# Patient Record
Sex: FEMALE | Race: BLACK OR AFRICAN AMERICAN | NOT HISPANIC OR LATINO | Employment: OTHER | URBAN - METROPOLITAN AREA
[De-identification: names, ages, dates, MRNs, and addresses within clinical notes are randomized per-mention and may not be internally consistent; named-entity substitution may affect disease eponyms.]

---

## 2017-01-10 PROCEDURE — 82607 VITAMIN B-12: CPT | Performed by: FAMILY MEDICINE

## 2017-01-10 PROCEDURE — 85025 COMPLETE CBC W/AUTO DIFF WBC: CPT | Performed by: FAMILY MEDICINE

## 2017-01-10 PROCEDURE — 84443 ASSAY THYROID STIM HORMONE: CPT | Performed by: FAMILY MEDICINE

## 2017-01-10 PROCEDURE — 85007 BL SMEAR W/DIFF WBC COUNT: CPT | Performed by: FAMILY MEDICINE

## 2017-01-10 PROCEDURE — 80061 LIPID PANEL: CPT | Performed by: FAMILY MEDICINE

## 2017-01-10 PROCEDURE — 80053 COMPREHEN METABOLIC PANEL: CPT | Performed by: FAMILY MEDICINE

## 2017-04-13 ENCOUNTER — OFFICE VISIT (OUTPATIENT)
Dept: INTERNAL MEDICINE | Facility: CLINIC | Age: 67
End: 2017-04-13

## 2017-04-13 VITALS — WEIGHT: 147.2 LBS | BODY MASS INDEX: 27.79 KG/M2 | TEMPERATURE: 97.9 F | HEIGHT: 61 IN

## 2017-04-13 DIAGNOSIS — J30.9 ALLERGIC RHINITIS, UNSPECIFIED ALLERGIC RHINITIS TRIGGER, UNSPECIFIED RHINITIS SEASONALITY: Primary | ICD-10-CM

## 2017-04-13 PROCEDURE — 99213 OFFICE O/P EST LOW 20 MIN: CPT | Performed by: FAMILY MEDICINE

## 2017-04-13 NOTE — PROGRESS NOTES
"Subjective   Carolyn Snowden is a 66 y.o. female.     History of Present Illness   Here today as a work in for hoarseness.  Last seen 12/29/16 for 1mo recheck GI. Was seen 12/1/16 for MCW and 6mo routine.    RESP- today pt reports she lost her voice 3 days ago. Does not feel sick. No fever. No sinus symptoms. Has had a little runny nose.    The following portions of the patient's history were reviewed and updated as appropriate: current medications, past family history, past medical history, past social history, past surgical history and problem list.    Review of Systems   Cardiovascular: Negative for chest pain.   Gastrointestinal: Negative for abdominal distention and abdominal pain.   Skin: Negative for color change.   Neurological: Negative for tremors, speech difficulty and headaches.   Psychiatric/Behavioral: Negative for agitation and confusion.   All other systems reviewed and are negative.        Current Outpatient Prescriptions:   •  aspirin 81 MG tablet, Take  by mouth., Disp: , Rfl:   •  Biotin 1000 MCG tablet, Take  by mouth., Disp: , Rfl:   •  carbonyl iron (FEOSOL) 45 MG tablet tablet, Take  by mouth daily., Disp: , Rfl:   •  clonazePAM (KlonoPIN) 1 MG tablet, , Disp: , Rfl:   •  coenzyme Q10 100 MG capsule, Take 100 mg by mouth daily., Disp: , Rfl:   •  LORazepam (ATIVAN) 0.5 MG tablet, TAKE 1 TABLET BY MOUTH AT BEDTIME, Disp: , Rfl: 2  •  LORazepam (ATIVAN) 1 MG tablet, TAKE 1 TABLET BY MOUTH AT BEDTIME, Disp: , Rfl: 2  •  montelukast (SINGULAIR) 10 MG tablet, , Disp: , Rfl:   •  Multiple Vitamins-Minerals (MULTIVITAL PO), Take  by mouth daily., Disp: , Rfl:   •  NIFEdipine CC (ADALAT CC) 60 MG 24 hr tablet, Take 1 tablet by mouth Daily. For blood pressure, Disp: 30 tablet, Rfl: 5  •  omeprazole (priLOSEC) 20 MG capsule, , Disp: , Rfl:   •  vitamin B-12 (CYANOCOBALAMIN) 1000 MCG tablet, Take 1,000 mcg by mouth daily., Disp: , Rfl:     Objective     Temp 97.9 °F (36.6 °C)  Ht 61\" (154.9 cm)  Wt " 147 lb 3.2 oz (66.8 kg)  BMI 27.81 kg/m2    Physical Exam   Constitutional: She is oriented to person, place, and time. She appears well-developed and well-nourished.   HENT:   Right Ear: Tympanic membrane and ear canal normal.   Left Ear: Tympanic membrane and ear canal normal.   Mouth/Throat: Oropharynx is clear and moist.   Eyes: Conjunctivae and EOM are normal. Pupils are equal, round, and reactive to light.   Neck: No thyromegaly present.   Cardiovascular: Normal rate and regular rhythm.    Pulmonary/Chest: Effort normal and breath sounds normal.   Neurological: She is alert and oriented to person, place, and time.   Skin: Skin is warm and dry.   Psychiatric: She has a normal mood and affect.   Vitals reviewed.      Assessment/Plan   Carolyn was seen today for illness.    Diagnoses and all orders for this visit:    Allergic rhinitis, unspecified allergic rhinitis trigger, unspecified rhinitis seasonality      1. RESP- allergies- discussed that she could be at the early point of a respiratory virus or this could just be allergies. If it is a virus, she will get worse for 1-2 days and then resolve. If it is allergies, it will wax and wane until the pollen settles down. To use claritin (loratadine) with NO DECONGESTANTS. Discussed that decongestant will raise her BP. Pt is advised to treat to her level of comfort.  2. RECHECK- prn

## 2017-04-13 NOTE — PATIENT INSTRUCTIONS
1. RESP- allergies- discussed that she could be at the early point of a respiratory virus or this could just be allergies. If it is a virus, she will get worse for 1-2 days and then resolve. If it is allergies, it will wax and wane until the pollen settles down. To use claritin (loratadine) with NO DECONGESTANTS. Discussed that decongestant will raise her BP. Pt is advised to treat to her level of comfort.  2. RECHECK- prn

## 2017-06-15 DIAGNOSIS — I10 ESSENTIAL HYPERTENSION: ICD-10-CM

## 2017-06-15 RX ORDER — POTASSIUM CHLORIDE 1500 MG/1
TABLET, EXTENDED RELEASE ORAL
Qty: 30 TABLET | Refills: 0 | Status: SHIPPED | OUTPATIENT
Start: 2017-06-15 | End: 2018-02-26

## 2017-10-07 DIAGNOSIS — I10 ESSENTIAL HYPERTENSION: ICD-10-CM

## 2017-10-09 RX ORDER — POTASSIUM CHLORIDE 1500 MG/1
TABLET, EXTENDED RELEASE ORAL
Qty: 30 TABLET | Refills: 0 | OUTPATIENT
Start: 2017-10-09

## 2017-10-10 DIAGNOSIS — I10 ESSENTIAL HYPERTENSION: ICD-10-CM

## 2017-10-10 RX ORDER — POTASSIUM CHLORIDE 1500 MG/1
TABLET, EXTENDED RELEASE ORAL
Qty: 30 TABLET | Refills: 0 | OUTPATIENT
Start: 2017-10-10

## 2017-11-03 DIAGNOSIS — I10 ESSENTIAL HYPERTENSION: ICD-10-CM

## 2017-11-03 RX ORDER — HYDROCHLOROTHIAZIDE 12.5 MG/1
CAPSULE, GELATIN COATED ORAL
Qty: 30 CAPSULE | Refills: 5 | OUTPATIENT
Start: 2017-11-03

## 2018-02-26 ENCOUNTER — OFFICE VISIT (OUTPATIENT)
Dept: INTERNAL MEDICINE | Facility: CLINIC | Age: 68
End: 2018-02-26

## 2018-02-26 VITALS
HEIGHT: 61 IN | WEIGHT: 161.2 LBS | SYSTOLIC BLOOD PRESSURE: 138 MMHG | BODY MASS INDEX: 30.43 KG/M2 | DIASTOLIC BLOOD PRESSURE: 88 MMHG | TEMPERATURE: 97.2 F

## 2018-02-26 DIAGNOSIS — Z00.00 ANNUAL PHYSICAL EXAM: Primary | ICD-10-CM

## 2018-02-26 DIAGNOSIS — R41.3 MEMORY LOSS: ICD-10-CM

## 2018-02-26 DIAGNOSIS — I10 ESSENTIAL HYPERTENSION: ICD-10-CM

## 2018-02-26 LAB
ALBUMIN SERPL-MCNC: 4.4 G/DL (ref 3.2–4.8)
ALBUMIN/GLOB SERPL: 1.4 G/DL (ref 1.5–2.5)
ALP SERPL-CCNC: 56 U/L (ref 25–100)
ALT SERPL W P-5'-P-CCNC: 15 U/L (ref 7–40)
ANION GAP SERPL CALCULATED.3IONS-SCNC: 8 MMOL/L (ref 3–11)
ARTICHOKE IGE QN: 162 MG/DL (ref 0–130)
AST SERPL-CCNC: 21 U/L (ref 0–33)
BASOPHILS # BLD AUTO: 0.03 10*3/MM3 (ref 0–0.2)
BASOPHILS NFR BLD AUTO: 0.5 % (ref 0–1)
BILIRUB SERPL-MCNC: 0.6 MG/DL (ref 0.3–1.2)
BUN BLD-MCNC: 12 MG/DL (ref 9–23)
BUN/CREAT SERPL: 15 (ref 7–25)
CALCIUM SPEC-SCNC: 9.6 MG/DL (ref 8.7–10.4)
CHLORIDE SERPL-SCNC: 104 MMOL/L (ref 99–109)
CHOLEST SERPL-MCNC: 271 MG/DL (ref 0–200)
CO2 SERPL-SCNC: 28 MMOL/L (ref 20–31)
CREAT BLD-MCNC: 0.8 MG/DL (ref 0.6–1.3)
DEPRECATED RDW RBC AUTO: 46.6 FL (ref 37–54)
EOSINOPHIL # BLD AUTO: 0.13 10*3/MM3 (ref 0–0.3)
EOSINOPHIL NFR BLD AUTO: 2.3 % (ref 0–3)
ERYTHROCYTE [DISTWIDTH] IN BLOOD BY AUTOMATED COUNT: 15 % (ref 11.3–14.5)
GFR SERPL CREATININE-BSD FRML MDRD: 86 ML/MIN/1.73
GLOBULIN UR ELPH-MCNC: 3.2 GM/DL
GLUCOSE BLD-MCNC: 88 MG/DL (ref 70–100)
HCT VFR BLD AUTO: 41.1 % (ref 34.5–44)
HDLC SERPL-MCNC: 83 MG/DL (ref 40–60)
HGB BLD-MCNC: 13.1 G/DL (ref 11.5–15.5)
IMM GRANULOCYTES # BLD: 0.01 10*3/MM3 (ref 0–0.03)
IMM GRANULOCYTES NFR BLD: 0.2 % (ref 0–0.6)
LYMPHOCYTES # BLD AUTO: 3.02 10*3/MM3 (ref 0.6–4.8)
LYMPHOCYTES NFR BLD AUTO: 54.3 % (ref 24–44)
MCH RBC QN AUTO: 27.3 PG (ref 27–31)
MCHC RBC AUTO-ENTMCNC: 31.9 G/DL (ref 32–36)
MCV RBC AUTO: 85.6 FL (ref 80–99)
MONOCYTES # BLD AUTO: 0.49 10*3/MM3 (ref 0–1)
MONOCYTES NFR BLD AUTO: 8.8 % (ref 0–12)
NEUTROPHILS # BLD AUTO: 1.88 10*3/MM3 (ref 1.5–8.3)
NEUTROPHILS NFR BLD AUTO: 33.9 % (ref 41–71)
PLATELET # BLD AUTO: 250 10*3/MM3 (ref 150–450)
PMV BLD AUTO: 12 FL (ref 6–12)
POTASSIUM BLD-SCNC: 4.3 MMOL/L (ref 3.5–5.5)
PROT SERPL-MCNC: 7.6 G/DL (ref 5.7–8.2)
RBC # BLD AUTO: 4.8 10*6/MM3 (ref 3.89–5.14)
SODIUM BLD-SCNC: 140 MMOL/L (ref 132–146)
T4 FREE SERPL-MCNC: 1.2 NG/DL (ref 0.89–1.76)
TRIGL SERPL-MCNC: 82 MG/DL (ref 0–150)
TSH SERPL DL<=0.05 MIU/L-ACNC: 0.7 MIU/ML (ref 0.35–5.35)
VIT B12 BLD-MCNC: 574 PG/ML (ref 211–911)
WBC NRBC COR # BLD: 5.56 10*3/MM3 (ref 3.5–10.8)

## 2018-02-26 PROCEDURE — 84443 ASSAY THYROID STIM HORMONE: CPT | Performed by: FAMILY MEDICINE

## 2018-02-26 PROCEDURE — 84439 ASSAY OF FREE THYROXINE: CPT | Performed by: FAMILY MEDICINE

## 2018-02-26 PROCEDURE — 85025 COMPLETE CBC W/AUTO DIFF WBC: CPT | Performed by: FAMILY MEDICINE

## 2018-02-26 PROCEDURE — 82607 VITAMIN B-12: CPT | Performed by: FAMILY MEDICINE

## 2018-02-26 PROCEDURE — G0439 PPPS, SUBSEQ VISIT: HCPCS | Performed by: FAMILY MEDICINE

## 2018-02-26 PROCEDURE — 80061 LIPID PANEL: CPT | Performed by: FAMILY MEDICINE

## 2018-02-26 PROCEDURE — 80053 COMPREHEN METABOLIC PANEL: CPT | Performed by: FAMILY MEDICINE

## 2018-02-26 NOTE — PATIENT INSTRUCTIONS
"1. MCW- annual fasting labs today. Discussed that mammos can be done every 2yr now and can stop at 74yo. Will locate her bone density results.   2. CV- HTN- BP within range even with pt off meds. Discussed that she is at the higher end of normal but if she is not going above 140 on top or 90 on bottom, then she may not truly have HTN. Discussed that nifedipine is not designed to use \"as needed\" as it takes weeks to control the BP. She is to check her BP every morning for the next month and then bring the log to a visit.   3. PSYCH- sleep disorder with current memory loss. Will check labs. Will check a mini mental. Will check a brain MRI. Pt also advised that not sleeping can cause serious memory loss and to see her sleep doctor sooner if needed.  4. RECHECK- 1mo  "

## 2018-02-26 NOTE — PROGRESS NOTES
QUICK REFERENCE INFORMATION:  The ABCs of the Annual Wellness Visit    Subsequent Medicare Wellness Visit    HEALTH RISK ASSESSMENT    1950    Recent Hospitalizations:  No hospitalization(s) within the last year..        Current Medical Providers:  Patient Care Team:  Ana Luisa Gauthier MD as PCP - General  Ana Luisa Gauthier MD as PCP - Family Medicine  Ana Luisa Gauthier MD as PCP - Claims Attributed        Smoking Status:  History   Smoking Status   • Former Smoker   Smokeless Tobacco   • Never Used       Alcohol Consumption:  History   Alcohol Use   • Yes     Comment: occasional       Depression Screen:   PHQ-2/PHQ-9 Depression Screening 2/26/2018   Little interest or pleasure in doing things 0   Feeling down, depressed, or hopeless 0   Trouble falling or staying asleep, or sleeping too much 1   Feeling tired or having little energy 1   Poor appetite or overeating 1   Feeling bad about yourself - or that you are a failure or have let yourself or your family down 0   Trouble concentrating on things, such as reading the newspaper or watching television 1   Moving or speaking so slowly that other people could have noticed. Or the opposite - being so fidgety or restless that you have been moving around a lot more than usual 0   Thoughts that you would be better off dead, or of hurting yourself in some way 0   Total Score 4   If you checked off any problems, how difficult have these problems made it for you to do your work, take care of things at home, or get along with other people? Not difficult at all       Health Habits and Functional and Cognitive Screening:  Functional & Cognitive Status 2/26/2018   Do you have difficulty preparing food and eating? No   Do you have difficulty bathing yourself, getting dressed or grooming yourself? No   Do you have difficulty using the toilet? No   Do you have difficulty moving around from place to place? No   Do you have trouble with steps or getting out of a bed or a  chair? No   In the past year have you fallen or experienced a near fall? Yes   Current Diet Well Balanced Diet   Dental Exam Up to date   Eye Exam Up to date   Exercise (times per week) 3 times per week   Current Exercise Activities Include Walking   Do you need help using the phone?  No   Are you deaf or do you have serious difficulty hearing?  No   Do you need help with transportation? No   Do you need help shopping? No   Do you need help preparing meals?  No   Do you need help with housework?  No   Do you need help with laundry? No   Do you need help taking your medications? No   Do you need help managing money? No   Have you felt unusual stress, anger or loneliness in the last month? No   Who do you live with? Alone   If you need help, do you have trouble finding someone available to you? No   Have you been bothered in the last four weeks by sexual problems? No   Do you have difficulty concentrating, remembering or making decisions? No           Does the patient have evidence of cognitive impairment? No    Aspirin use counseling: Taking ASA appropriately as indicated      Recent Lab Results:  CMP:  Lab Results   Component Value Date    BUN 12 01/10/2017    CREATININE 0.90 01/10/2017    EGFRIFAFRI 75 01/10/2017    BCR 13.3 01/10/2017     01/10/2017    K 4.4 01/10/2017    CO2 32.0 (H) 01/10/2017    CALCIUM 10.0 01/10/2017    ALBUMIN 4.20 01/10/2017    LABIL2 1.4 (L) 01/10/2017    BILITOT 0.6 01/10/2017    ALKPHOS 54 01/10/2017    AST 28 01/10/2017    ALT 17 01/10/2017     Lipid Panel:  Lab Results   Component Value Date    CHOL 274 (H) 01/10/2017    TRIG 80 01/10/2017    HDL 92 (H) 01/10/2017     HbA1c:       Visual Acuity:  No exam data present    Age-appropriate Screening Schedule:  Refer to the list below for future screening recommendations based on patient's age, sex and/or medical conditions. Orders for these recommended tests are listed in the plan section. The patient has been provided with a written  plan.    Health Maintenance   Topic Date Due   • TDAP/TD VACCINES (1 - Tdap) 08/02/1967   • PNEUMOCOCCAL VACCINES (65+ LOW/MEDIUM RISK) (1 of 2 - PCV13) 08/02/2013   • ZOSTER VACCINE  06/01/2016   • INFLUENZA VACCINE  08/01/2017   • MAMMOGRAM  12/04/2019   • COLONOSCOPY  09/04/2025        Subjective   History of Present Illness    Carolyn Snowden is a 67 y.o. female who presents for an Subsequent Wellness Visit.  Here for MCW. Last seen 4/13/17 for allergies, advised OTC Claritin. Was seen 12/29/16 for 1mo recheck GI, HTN. Was seen 12/1/16 for MCW and 6mo routine. Was seen 12/1/15 as a new patient, to establish. Has “sleep REM” d/o, treated at a sleep center in [place]; takes lorazepam for this. Labs 1/10/17 demo normal CBC, CMP, TSH, B12 and lipid with , tg 80, HDL 92,  (previous 162, tg 57, HDL 76, LDL 58).      1.CV- HTN. Currently on nifedipine. Has ACEI cough and was able to stop HCTZ/ Klorcon in 2016. No cardio symptoms. Was jogging at last discussion. Today pt reports she stopped the procardia after her last visit. Takes her BP and takes it prn, usually once a week. No CP, palpitations, dyspnea or edema.     2.GI- GERD. Currently on omeprazole. Did not get adequate control with protonix in past. Today pt reports she was able to stop the omeprazole a month after her last routine.    3. MCW- initial 12/1/16  Bone Density- none. Ordered 12/1/16 with no results received.  Colonoscopy- 9/4/15. Pt had 3 polyps. Advised 5yr recheck.   EKG- 12/1/16  Mammo- 12/4/17- [facility]  Hep C screen- declined  Immunizations:  Pneumovax: declined.                        Zostavax: none. Pt believes she never had chickenpox                        Flu: none                        Hep B series: NA                        TDAP: none  Specialists: Eye Specialist- cataract surgery                        Dr Green- Pulmonology- sleep d/o    CONCERNS- today pt reports she is having trouble getting to sleep currently.  Will not sleep for 2 days and then will sleep for 2 days. If she is sleeping long and hard, she will wet the bed.  Is having memory loss. Cannot remember an entire day from 2 wk ago. Has also had balance issues with 1 almost fall.  Next appt with sleep doctor is in April. No fam hx Alzheimer's. Sibs are having some memory loss (sister and brother have both had CVA).     The following portions of the patient's history were reviewed and updated as appropriate: current medications, past family history, past medical history, past social history, past surgical history and problem list.    Outpatient Medications Prior to Visit   Medication Sig Dispense Refill   • aspirin 81 MG tablet Take  by mouth.     • Biotin 1000 MCG tablet Take  by mouth.     • carbonyl iron (FEOSOL) 45 MG tablet tablet Take  by mouth daily.     • clonazePAM (KlonoPIN) 1 MG tablet      • coenzyme Q10 100 MG capsule Take 100 mg by mouth daily.     • KLOR-CON 20 MEQ CR tablet TAKE ONE TABLET BY MOUTH ONCE DAILY 30 tablet 0   • LORazepam (ATIVAN) 0.5 MG tablet TAKE 1 TABLET BY MOUTH AT BEDTIME  2   • LORazepam (ATIVAN) 1 MG tablet TAKE 1 TABLET BY MOUTH AT BEDTIME  2   • montelukast (SINGULAIR) 10 MG tablet      • Multiple Vitamins-Minerals (MULTIVITAL PO) Take  by mouth daily.     • NIFEdipine CC (ADALAT CC) 60 MG 24 hr tablet Take 1 tablet by mouth Daily. For blood pressure 30 tablet 5   • omeprazole (priLOSEC) 20 MG capsule      • vitamin B-12 (CYANOCOBALAMIN) 1000 MCG tablet Take 1,000 mcg by mouth daily.       No facility-administered medications prior to visit.        Patient Active Problem List   Diagnosis   • Atopic rhinitis   • Diverticulitis of intestine   • Gastroesophageal reflux disease   • Essential hypertension   • REM sleep behavior disorder       Advance Care Planning:  has NO advance directive - information provided to the patient today    Identification of Risk Factors:  Risk factors include: n/a.    Review of Systems  "  Cardiovascular: Negative for chest pain.   Gastrointestinal: Negative for abdominal distention and abdominal pain.   Skin: Negative for color change.   Neurological: Negative for tremors, speech difficulty and headaches.   Psychiatric/Behavioral: Negative for agitation and confusion.   All other systems reviewed and are negative.      Compared to one year ago, the patient feels her physical health is the same.  Compared to one year ago, the patient feels her mental health is the same.    Objective     Physical Exam   Constitutional: She is oriented to person, place, and time. She appears well-developed and well-nourished.   HENT:   Right Ear: Tympanic membrane and ear canal normal.   Left Ear: Tympanic membrane and ear canal normal.   Mouth/Throat: Oropharynx is clear and moist.   Eyes: Conjunctivae and EOM are normal. Pupils are equal, round, and reactive to light.   Neck: No thyromegaly present.   Cardiovascular: Normal rate and regular rhythm.    Pulmonary/Chest: Effort normal and breath sounds normal.   Neurological: She is alert and oriented to person, place, and time.   Skin: Skin is warm and dry.   Psychiatric: She has a normal mood and affect.   Vitals reviewed.      Vitals:    02/26/18 1411   BP: 138/88   Temp: 97.2 °F (36.2 °C)   Weight: 73.1 kg (161 lb 3.2 oz)   Height: 154.9 cm (61\")   PainSc: 0-No pain       Body mass index is 30.46 kg/(m^2).  Discussed the patient's BMI with her. BMI is above normal parameters. Follow-up plan includes:  no follow-up required.    Assessment/Plan   Patient Self-Management and Personalized Health Advice  The patient has been provided with information about: Discussion today with patient regarding current guidelines for timing/ frequency of paps, mammograms, colonoscopy (or cologuard), bone density tests and lab tests.     Immunizations discussed today with current recommendations advised for DTaP, Zostavax, Pneumovax and Prevnar 13.    Appropriate diet and stretching " "discussed with handouts provided.  Visit Diagnoses:  No diagnosis found.    No orders of the defined types were placed in this encounter.      Outpatient Encounter Prescriptions as of 2/26/2018   Medication Sig Dispense Refill   • aspirin 81 MG tablet Take  by mouth.     • Biotin 1000 MCG tablet Take  by mouth.     • carbonyl iron (FEOSOL) 45 MG tablet tablet Take  by mouth daily.     • clonazePAM (KlonoPIN) 1 MG tablet      • coenzyme Q10 100 MG capsule Take 100 mg by mouth daily.     • KLOR-CON 20 MEQ CR tablet TAKE ONE TABLET BY MOUTH ONCE DAILY 30 tablet 0   • LORazepam (ATIVAN) 0.5 MG tablet TAKE 1 TABLET BY MOUTH AT BEDTIME  2   • LORazepam (ATIVAN) 1 MG tablet TAKE 1 TABLET BY MOUTH AT BEDTIME  2   • montelukast (SINGULAIR) 10 MG tablet      • Multiple Vitamins-Minerals (MULTIVITAL PO) Take  by mouth daily.     • NIFEdipine CC (ADALAT CC) 60 MG 24 hr tablet Take 1 tablet by mouth Daily. For blood pressure 30 tablet 5   • omeprazole (priLOSEC) 20 MG capsule      • vitamin B-12 (CYANOCOBALAMIN) 1000 MCG tablet Take 1,000 mcg by mouth daily.       No facility-administered encounter medications on file as of 2/26/2018.        Reviewed use of high risk medication in the elderly: yes  Reviewed for potential of harmful drug interactions in the elderly: not applicable    Follow Up:  No Follow-up on file.     An After Visit Summary and PPPS with all of these plans were given to the patient.    1. MCW- annual fasting labs today. Discussed that mammos can be done every 2yr now and can stop at 72yo. Will locate her bone density results.   2. CV- HTN- BP within range even with pt off meds. Discussed that she is at the higher end of normal but if she is not going above 140 on top or 90 on bottom, then she may not truly have HTN. Discussed that nifedipine is not designed to use \"as needed\" as it takes weeks to control the BP. She is to check her BP every morning for the next month and then bring the log to a visit.   3. " PSYCH- sleep disorder with current memory loss. Will check labs. Will check a mini mental (score 29/30, missed the date). Will check a brain MRI. Pt also advised that not sleeping can cause serious memory loss and to see her sleep doctor sooner if needed.  4. RECHECK- 1mo

## 2018-03-05 ENCOUNTER — HOSPITAL ENCOUNTER (OUTPATIENT)
Dept: MRI IMAGING | Facility: HOSPITAL | Age: 68
Discharge: HOME | End: 2018-03-05
Attending: FAMILY MEDICINE | Admitting: FAMILY MEDICINE

## 2018-03-05 PROCEDURE — 0 GADOBENATE DIMEGLUMINE 529 MG/ML SOLUTION: Performed by: FAMILY MEDICINE

## 2018-03-05 PROCEDURE — A9577 INJ MULTIHANCE: HCPCS | Performed by: FAMILY MEDICINE

## 2018-03-05 PROCEDURE — 70553 MRI BRAIN STEM W/O & W/DYE: CPT

## 2018-03-05 RX ADMIN — GADOBENATE DIMEGLUMINE 15 ML: 529 INJECTION, SOLUTION INTRAVENOUS at 15:00

## 2018-03-13 ENCOUNTER — OFFICE VISIT (OUTPATIENT)
Dept: INTERNAL MEDICINE | Facility: CLINIC | Age: 68
End: 2018-03-13

## 2018-03-13 ENCOUNTER — TELEPHONE (OUTPATIENT)
Dept: INTERNAL MEDICINE | Facility: CLINIC | Age: 68
End: 2018-03-13

## 2018-03-13 VITALS
TEMPERATURE: 97.5 F | DIASTOLIC BLOOD PRESSURE: 72 MMHG | SYSTOLIC BLOOD PRESSURE: 120 MMHG | BODY MASS INDEX: 30.7 KG/M2 | HEIGHT: 61 IN | WEIGHT: 162.6 LBS

## 2018-03-13 DIAGNOSIS — E78.00 PURE HYPERCHOLESTEROLEMIA: ICD-10-CM

## 2018-03-13 DIAGNOSIS — F41.8 DEPRESSION WITH ANXIETY: Primary | ICD-10-CM

## 2018-03-13 PROCEDURE — 99214 OFFICE O/P EST MOD 30 MIN: CPT | Performed by: FAMILY MEDICINE

## 2018-03-13 RX ORDER — ESCITALOPRAM OXALATE 10 MG/1
10 TABLET ORAL DAILY
Qty: 30 TABLET | Refills: 0 | Status: SHIPPED | OUTPATIENT
Start: 2018-03-13 | End: 2018-04-17

## 2018-03-13 NOTE — PATIENT INSTRUCTIONS
1. PSYCH- mood- discussed that her symptoms suggest a serotonin imbalance in her limbic system and her gut. Will do a trial with lexapro.   2. CV- hyperlipid. Education re the pathophysiology. Discussed that her LDL needs to be under 160, preferably closer to 130. Will start with diet as pt has not been following a healthy recently. Will recheck this in 1mo.  3. RECHECK- postpone appt in 2wk for BP to 4wk and will look at BP, lipid and mood

## 2018-03-13 NOTE — PROGRESS NOTES
Subjective   Carolyn Snowden is a 67 y.o. female   Here for recheck memory loss and high cholesterol. Last seen 2/26/18 for MCW. Was seen 4/13/17 for allergies, advised OTC Claritin. Was seen 12/29/16 for 1mo recheck GI, HTN. Was seen 12/1/16 for MCW and 6mo routine. Was seen 12/1/15 as a new patient, to establish. Has “sleep REM” d/o, treated at a sleep center in [place]; takes lorazepam for this. Lab 2/26/18 demo normal CBC, CMP, TSH, free T4 and B12. Lipid was high with , tg 82, HDL 80, . Labs 1/10/17 demo normal CBC, CMP, TSH, B12 and lipid with , tg 80, HDL 92,  (previous 162, tg 57, HDL 76, LDL 58).      1.CV- HTN, improved with pt able to stop nifedipine in 2017 and HCTZ/ Klorcon in 2016. No cardio symptoms with jogging. At her last visit her lipid was up. Today pt reports she has been eating more comfort foods and gaining weight.    2. PSYCH- memory loss. At her MCW pt reported continued sleep issues (pt has sleep REM d/o, treated by sleep specialist in [place]). Was having trouble getting to sleep and then sleeping so hard she wet the bed. Could not remember an entire day 2wk prior. Next appt with sleep doctor is in April. No fam hx Alzheimer's. Sibs are having some memory loss (sister and brother have both had CVA). Mini mental was normal. MRI brain was normal. Today pt reports she has been feeling depressed. Occurs every year around January which is the anniversary of her 's death 5yr ago. Pt is sad with associated symptoms of: some guilt, anhedonia, withdrawing, especially having decreased motivation crying and memory problem. Having loose stools after meals. Having sleep issues with insomnia and then oversleeping to compensate.    The following portions of the patient's history were reviewed and updated as appropriate: allergies, past family history, past medical history, past social history, past surgical history and problem list.    Review of Systems:  General: negative  CV:  "negative  Respiratory: negative  Neuro: negative  Psych: negative    Objective   /72 (BP Location: Right arm, Patient Position: Sitting, Cuff Size: Adult)   Temp 97.5 °F (36.4 °C) (Temporal Artery )   Ht 154.9 cm (61\")   Wt 73.8 kg (162 lb 9.6 oz)   BMI 30.72 kg/m²     Physical Exam   Constitutional: She is oriented to person, place, and time. She appears well-developed and well-nourished.   HENT:   Right Ear: Tympanic membrane and ear canal normal.   Left Ear: Tympanic membrane and ear canal normal.   Mouth/Throat: Oropharynx is clear and moist.   Eyes: Conjunctivae and EOM are normal. Pupils are equal, round, and reactive to light.   Neck: No thyromegaly present.   Cardiovascular: Normal rate and regular rhythm.    Pulmonary/Chest: Effort normal and breath sounds normal.   Neurological: She is alert and oriented to person, place, and time.   Skin: Skin is warm and dry.   Psychiatric: She has a normal mood and affect.   Vitals reviewed.      Assessment/Plan   Carolyn was seen today for follow-up.    Diagnoses and all orders for this visit:    Depression with anxiety  -     escitalopram (LEXAPRO) 10 MG tablet; Take 1 tablet by mouth Daily.    Pure hypercholesterolemia         1. PSYCH- mood- discussed that her symptoms suggest a serotonin imbalance in her limbic system and her gut. Will do a trial with lexapro.   2. CV- hyperlipid. Education re the pathophysiology. Discussed that her LDL needs to be under 160, preferably closer to 130. Will start with diet as pt has not been following a healthy recently. Will recheck this in 1mo.  3. RECHECK- postpone appt in 2wk for BP to 4wk and will look at BP, lipid and mood (in house lipid)       "

## 2018-03-14 DIAGNOSIS — I10 ESSENTIAL HYPERTENSION: ICD-10-CM

## 2018-03-14 RX ORDER — NIFEDIPINE 60 MG/1
60 TABLET, FILM COATED, EXTENDED RELEASE ORAL DAILY
Qty: 30 TABLET | Refills: 5 | Status: SHIPPED | OUTPATIENT
Start: 2018-03-14 | End: 2019-01-22 | Stop reason: SDUPTHER

## 2018-04-17 ENCOUNTER — OFFICE VISIT (OUTPATIENT)
Dept: INTERNAL MEDICINE | Facility: CLINIC | Age: 68
End: 2018-04-17

## 2018-04-17 VITALS
TEMPERATURE: 97.1 F | HEIGHT: 61 IN | WEIGHT: 158.2 LBS | BODY MASS INDEX: 29.87 KG/M2 | SYSTOLIC BLOOD PRESSURE: 118 MMHG | DIASTOLIC BLOOD PRESSURE: 86 MMHG

## 2018-04-17 DIAGNOSIS — J30.9 CHRONIC ALLERGIC RHINITIS, UNSPECIFIED SEASONALITY, UNSPECIFIED TRIGGER: ICD-10-CM

## 2018-04-17 DIAGNOSIS — F41.8 DEPRESSION WITH ANXIETY: ICD-10-CM

## 2018-04-17 DIAGNOSIS — E78.00 PURE HYPERCHOLESTEROLEMIA: ICD-10-CM

## 2018-04-17 DIAGNOSIS — I10 ESSENTIAL HYPERTENSION: Primary | ICD-10-CM

## 2018-04-17 LAB
CHOLEST SERPL-MCNC: 246 MG/DL
EXPIRATION DATE: NORMAL
HDLC SERPL-MCNC: 64 MG/DL
LDLC SERPL CALC-MCNC: 147 MG/DL
Lab: NORMAL
TRIGL SERPL-MCNC: 171 MG/DL

## 2018-04-17 PROCEDURE — 99214 OFFICE O/P EST MOD 30 MIN: CPT | Performed by: FAMILY MEDICINE

## 2018-04-17 PROCEDURE — 80061 LIPID PANEL: CPT | Performed by: FAMILY MEDICINE

## 2018-04-17 RX ORDER — TRAZODONE HYDROCHLORIDE 50 MG/1
TABLET ORAL
Qty: 30 TABLET | Refills: 0 | Status: SHIPPED | OUTPATIENT
Start: 2018-04-17 | End: 2018-05-15

## 2018-04-17 RX ORDER — DULOXETIN HYDROCHLORIDE 60 MG/1
60 CAPSULE, DELAYED RELEASE ORAL DAILY
Qty: 30 CAPSULE | Refills: 0 | Status: SHIPPED | OUTPATIENT
Start: 2018-04-17 | End: 2018-05-15 | Stop reason: SDUPTHER

## 2018-04-17 NOTE — PATIENT INSTRUCTIONS
1. CV- HTN, hyperlipid- BP at goal except on occasion. Discussed that this could be situational, especially if she is having low sugars when not eating. To start having a protein shake for breakfast or lunch on the days she is not hungry. To continue the nifedipine at current dose and to continue to monitor BP but is to note the situation on the occasions when it goes high (ie- is she feeling low sugar). Lipid in house today improved on diet with , tg 171, HDL 64, .   2. PSYCH- depression with anxiety- getting very little response to lexapro. Discussed that this would indicate that she needs serotonin and norepinephrine. Will change her to cymbalta. Will also add trazodone for sleep as this should not interfere with her other sleep treatments and is a safe option. Also advised trial with melatonin (OTC)  3. RESP- allergies with associated inner ear vertigo. Advised to use claritin or flonase but to avoid all decongestants.   4. RECHECK- 1mo

## 2018-04-17 NOTE — PROGRESS NOTES
Subjective   Carolyn Snowden is a 67 y.o. female.     History of Present Illness   Here for 1mo recheck BP, lipid and mood. Last seen 3/13/18 for recheck memory loss and high cholesterol. Was seen 2/26/18 for MCW. Was seen 4/13/17 for allergies, advised OTC Claritin. Was seen 12/29/16 for 1mo recheck GI, HTN. Was seen 12/1/16 for MCW and 6mo routine. Was seen 12/1/15 as a new patient. Has “sleep REM” d/o, treated at a sleep center in [place]; takes lorazepam for this. Lab 2/26/18 demo normal CBC, CMP, TSH, free T4 and B12. Lipid was high with , tg 82, HDL 80, . Labs 1/10/17 demo normal CBC, CMP, TSH, B12 and lipid with , tg 80, HDL 92,  (previous 162, tg 57, HDL 76, LDL 58).      1.CV- HTN and hyperlipidemia. BP improved and pt was able to stop HCTZ/ Klorcon in 2016 and nifedipine in 2017. No cardio symptoms with jogging. At her last visit 3/13/18 her lipid was up ) and she was regaining weight; was advised to monitor her BP; preferred to try diet first. Today pt reports her BP went to 145/ 74 so she did restart nifedipine and her BP improved to 127/73 for the most part but still had 3 up to 145-162. Does go without eating all day on occasion (no appetite) and may get shaky. Is fasting for lab today.      2. PSYCH- memory loss/ depression with anxiety. At her [facility] pt reported continued sleep issues (pt has sleep REM d/o, treated by sleep specialist in [place]). Was having trouble getting to sleep and then sleeping so hard she wet the bed. Could not remember an entire day 2wk prior. Mini mental and MRI brain were normal. On discussion, pt was depressed. Occurs every year around January which is the anniversary of her 's death 2013. Pt was sad with some guilt, anhedonia, withdrawing, especially having decreased motivation, crying and memory problem. Having loose stools after meals. Having sleep issues with insomnia and then oversleeping to compensate. Was started on Lexapro. Today she  "reports no S/E. Is still not sleeping well, up until 3-4 am. No memory or motivation improvement. Is not as sad now.     3. RESP- today pt reports she has had some balance issues. Did have a left earache recently.    The following portions of the patient's history were reviewed and updated as appropriate: current medications, past family history, past medical history, past social history, past surgical history and problem list.    Review of Systems   Cardiovascular: Negative for chest pain.   Gastrointestinal: Negative for abdominal distention and abdominal pain.   Skin: Negative for color change.   Neurological: Negative for tremors, speech difficulty and headaches.   Psychiatric/Behavioral: Negative for agitation and confusion.   All other systems reviewed and are negative.        Current Outpatient Prescriptions:   •  aspirin 81 MG tablet, Take  by mouth., Disp: , Rfl:   •  clonazePAM (KlonoPIN) 1 MG tablet, , Disp: , Rfl:   •  DULoxetine (CYMBALTA) 60 MG capsule, Take 1 capsule by mouth Daily., Disp: 30 capsule, Rfl: 0  •  LORazepam (ATIVAN) 0.5 MG tablet, TAKE 1 TABLET BY MOUTH AT BEDTIME, Disp: , Rfl: 2  •  LORazepam (ATIVAN) 1 MG tablet, TAKE 1 TABLET BY MOUTH AT BEDTIME, Disp: , Rfl: 2  •  montelukast (SINGULAIR) 10 MG tablet, , Disp: , Rfl:   •  NIFEdipine CC (ADALAT CC) 60 MG 24 hr tablet, Take 1 tablet by mouth Daily. For blood pressure, Disp: 30 tablet, Rfl: 5  •  traZODone (DESYREL) 50 MG tablet, 1 tab po hs prn sleep, Disp: 30 tablet, Rfl: 0    Objective     /86   Temp 97.1 °F (36.2 °C)   Ht 154.9 cm (61\")   Wt 71.8 kg (158 lb 3.2 oz)   BMI 29.89 kg/m²     Physical Exam   Constitutional: She is oriented to person, place, and time. She appears well-developed and well-nourished.   HENT:   Right Ear: Tympanic membrane and ear canal normal.   Left Ear: Tympanic membrane and ear canal normal.   Mouth/Throat: Oropharynx is clear and moist.   TMS normal other than loss of light reflex   Eyes: " Conjunctivae and EOM are normal. Pupils are equal, round, and reactive to light.   Neck: No thyromegaly present.   Cardiovascular: Normal rate and regular rhythm.    Pulmonary/Chest: Effort normal and breath sounds normal.   Neurological: She is alert and oriented to person, place, and time.   Skin: Skin is warm and dry.   Psychiatric: She has a normal mood and affect.   Vitals reviewed.      Assessment/Plan   Carolyn was seen today for follow-up.    Diagnoses and all orders for this visit:    Essential hypertension    Pure hypercholesterolemia    Depression with anxiety  -     DULoxetine (CYMBALTA) 60 MG capsule; Take 1 capsule by mouth Daily.  -     traZODone (DESYREL) 50 MG tablet; 1 tab po hs prn sleep    Chronic allergic rhinitis, unspecified seasonality, unspecified trigger        1. CV- HTN, hyperlipid- BP at goal except on occasion. Discussed that this could be situational, especially if she is having low sugars when not eating. To start having a protein shake for breakfast or lunch on the days she is not hungry. To continue the nifedipine at current dose and to continue to monitor BP but is to note the situation on the occasions when it goes high (ie- is she feeling low sugar). Lipid in house today improved on diet with , tg 171, HDL 64, .   2. PSYCH- depression with anxiety- getting very little response to lexapro. Discussed that this would indicate that she needs serotonin and norepinephrine. Will change her to cymbalta. Will also add trazodone for sleep as this should not interfere with her other sleep treatments and is a safe option. Also advised trial with melatonin (OTC)  3. RESP- allergies with associated inner ear vertigo. Advised to use claritin or flonase but to avoid all decongestants.   4. RECHECK- 1mo

## 2018-05-15 ENCOUNTER — OFFICE VISIT (OUTPATIENT)
Dept: INTERNAL MEDICINE | Facility: CLINIC | Age: 68
End: 2018-05-15

## 2018-05-15 VITALS
WEIGHT: 161.2 LBS | TEMPERATURE: 97.8 F | BODY MASS INDEX: 30.43 KG/M2 | DIASTOLIC BLOOD PRESSURE: 68 MMHG | HEIGHT: 61 IN | SYSTOLIC BLOOD PRESSURE: 100 MMHG

## 2018-05-15 DIAGNOSIS — F41.8 DEPRESSION WITH ANXIETY: Primary | ICD-10-CM

## 2018-05-15 PROCEDURE — 99213 OFFICE O/P EST LOW 20 MIN: CPT | Performed by: FAMILY MEDICINE

## 2018-05-15 RX ORDER — DULOXETIN HYDROCHLORIDE 60 MG/1
60 CAPSULE, DELAYED RELEASE ORAL DAILY
Qty: 30 CAPSULE | Refills: 1 | Status: SHIPPED | OUTPATIENT
Start: 2018-05-15 | End: 2018-07-16

## 2018-05-15 NOTE — PROGRESS NOTES
Subjective   Carolyn Snowden is a 67 y.o. female.     History of Present Illness   Here for 1mo recheck mood. Last seen 4/17/18 for 1mo recheck BP, lipid and mood. [place] [facility] Was seen 12/1/15 as a new patient. Has “sleep REM” d/o, treated at a sleep center in [place]; takes lorazepam for this. Lab 5/15/18 demo , tg 185, HDL 78, .  Lab 4/17/18 demo , tg 171, HDL 64, . Lab 2/26/18 demo normal CBC, CMP, TSH, free T4 and B12. Lipid was high with , tg 82, HDL 80, . Labs 1/10/17 demo normal CBC, CMP, TSH, B12 and lipid with , tg 80, HDL 92,  (previous 162, tg 57, HDL 76, LDL 58).      1.CV- HTN and hyperlipidemia. BP improved and pt was able to stop HCTZ/ Klorcon in 2016 and nifedipine in 2017. Had elevated lipid 3/13/18 with . Pt worked on diet and LDL improved to 147 on lab 4/17/18.      2. PSYCH- memory loss/ depression with anxiety. At her [facility] 2/26/18 pt reported continued sleep issues (pt has sleep REM d/o, treated by sleep specialist in [place]). Was having trouble getting to sleep and then sleeping so hard she wet the bed. Could not remember an entire day 2wk prior. Mini mental and MRI brain were normal. On discussion, pt was depressed. Occurs every year around January which is the anniversary of her 's death 2013. Pt was sad with some guilt, anhedonia, withdrawing, especially having decreased motivation, crying and memory problem. Having loose stools after meals. Having sleep issues with insomnia and then oversleeping to compensate. Did not respond to Lexapro and was changed to Cymbalta with the addition of trazodone for sleep. Today pt reports no S/E with cymbalta but did have S/E of feeling like she was floating with the trazodone. Her mood is improving. Feels the better weather and outdoor activity is also helping. Overall feels her mood is 50% better. Stopped the trazodone after 2 doses but then started taking the lorazepam at the same  "time every night and has slept better since taking it routinely.     The following portions of the patient's history were reviewed and updated as appropriate: current medications, past family history, past medical history, past social history, past surgical history and problem list.    Review of Systems   Cardiovascular: Negative for chest pain.   Gastrointestinal: Negative for abdominal distention and abdominal pain.   Skin: Negative for color change.   Neurological: Negative for tremors, speech difficulty and headaches.   Psychiatric/Behavioral: Negative for agitation and confusion.   All other systems reviewed and are negative.        Current Outpatient Prescriptions:   •  aspirin 81 MG tablet, Take  by mouth., Disp: , Rfl:   •  clonazePAM (KlonoPIN) 1 MG tablet, , Disp: , Rfl:   •  DULoxetine (CYMBALTA) 60 MG capsule, Take 1 capsule by mouth Daily., Disp: 30 capsule, Rfl: 1  •  LORazepam (ATIVAN) 0.5 MG tablet, TAKE 1 TABLET BY MOUTH AT BEDTIME, Disp: , Rfl: 2  •  LORazepam (ATIVAN) 1 MG tablet, TAKE 1 TABLET BY MOUTH AT BEDTIME, Disp: , Rfl: 2  •  montelukast (SINGULAIR) 10 MG tablet, , Disp: , Rfl:   •  NIFEdipine CC (ADALAT CC) 60 MG 24 hr tablet, Take 1 tablet by mouth Daily. For blood pressure, Disp: 30 tablet, Rfl: 5    Objective     /68   Temp 97.8 °F (36.6 °C)   Ht 154.9 cm (61\")   Wt 73.1 kg (161 lb 3.2 oz)   BMI 30.46 kg/m²     Physical Exam   Constitutional: She is oriented to person, place, and time. She appears well-developed and well-nourished.   HENT:   Right Ear: Tympanic membrane and ear canal normal.   Left Ear: Tympanic membrane and ear canal normal.   Mouth/Throat: Oropharynx is clear and moist.   Eyes: Conjunctivae and EOM are normal. Pupils are equal, round, and reactive to light.   Neck: No thyromegaly present.   Cardiovascular: Normal rate and regular rhythm.    Pulmonary/Chest: Effort normal and breath sounds normal.   Neurological: She is alert and oriented to person, place, " and time.   Skin: Skin is warm and dry.   Psychiatric: She has a normal mood and affect.   Vitals reviewed.      Assessment/Plan   Carolyn was seen today for follow-up.    Diagnoses and all orders for this visit:    Depression with anxiety  -     DULoxetine (CYMBALTA) 60 MG capsule; Take 1 capsule by mouth Daily.      1. PSYCH- depression with anxiety- mood improving appropriately for 1mo on cymbalta. Will continue this and recheck in 2mo to ensure she reaches goal. Will take trazodone out of the meds list. Discussed that her sleep can be improving due to the cymbalta and the more routine use of the lorazepam.   2. CV- Lab 5/15/18 demo , tg 185, HDL 78, .  3. RECHECK- 2mo

## 2018-05-15 NOTE — PATIENT INSTRUCTIONS
1. PSYCH- depression with anxiety- mood improving appropriately for 1mo on cymbalta. Will continue this and recheck in 2mo to ensure she reaches goal. Will take trazodone out of the meds list. Discussed that her sleep can be improving due to the cymbalta and the more routine use of the lorazepam.   2. CV- Lab 5/15/18 demo , tg 185, HDL 78, .  3. RECHECK- 2mo

## 2018-07-16 ENCOUNTER — OFFICE VISIT (OUTPATIENT)
Dept: INTERNAL MEDICINE | Facility: CLINIC | Age: 68
End: 2018-07-16

## 2018-07-16 VITALS
OXYGEN SATURATION: 100 % | SYSTOLIC BLOOD PRESSURE: 140 MMHG | DIASTOLIC BLOOD PRESSURE: 78 MMHG | HEART RATE: 64 BPM | HEIGHT: 61 IN | RESPIRATION RATE: 16 BRPM | WEIGHT: 158 LBS | BODY MASS INDEX: 29.83 KG/M2

## 2018-07-16 DIAGNOSIS — F41.8 DEPRESSION WITH ANXIETY: Primary | ICD-10-CM

## 2018-07-16 PROCEDURE — 99213 OFFICE O/P EST LOW 20 MIN: CPT | Performed by: FAMILY MEDICINE

## 2018-07-16 RX ORDER — BUSPIRONE HYDROCHLORIDE 10 MG/1
TABLET ORAL
Qty: 60 TABLET | Refills: 0 | Status: SHIPPED | OUTPATIENT
Start: 2018-07-16 | End: 2019-01-22 | Stop reason: SDUPTHER

## 2018-07-16 NOTE — PROGRESS NOTES
Subjective   Carolyn Snowden is a 67 y.o. female.     History of Present Illness   Here for 2mo recheck. Last seen 5/15/18 for 1mo recheck mood. Was seen 4/17/18 for 1mo recheck BP, lipid and mood. Was seen 2/26/18 for MCW. Was seen 12/1/15 as a new patient. Has “sleep REM” d/o, treated at a sleep center in [place]; takes lorazepam for this. Lab 5/15/18 dmeo , tg 185, HDL 78,  with diet. Lab 5/15/18 demo , tg 185, HDL 78, .  Lab 4/17/18 demo , tg 171, HDL 64, . Lab 2/26/18 demo normal CBC, CMP, TSH, free T4 and B12. Lipid was high with , tg 82, HDL 80, . Labs 1/10/17 demo normal CBC, CMP, TSH, B12 and lipid with , tg 80, HDL 92,  (previous 162, tg 57, HDL 76, LDL 58).      1.CV- HTN and hyperlipidemia. BP improved and pt was able to stop HCTZ/ Klorcon in 2016 and nifedipine in 2017. Had elevated lipid 3/13/18 with . Pt worked on diet and LDL improved to 147 on lab 4/17/18; further improved to 117 on lab 5/15/18.      2. PSYCH- memory loss/ depression with anxiety. At her [facility] 2/26/18 pt reported continued sleep issues (pt has sleep REM d/o, treated by sleep specialist in [place]). Was having trouble getting to sleep and then sleeping so hard she wet the bed. Had forgotten an entire day. Mini mental and MRI brain were normal. On discussion, pt was depressed. Occurs every year around January which is the anniversary of her 's death 2013. Pt was sad with some guilt, anhedonia, withdrawing, especially having decreased motivation, crying, having loose stools and memory problem. Did not respond to Lexapro. Was given Cymbalta and reached 50% of goal at 1mo. Was also given trazodone but had S/E and returned to lorazepam as Rx’ed by Sleep Doctor; responded better to this when started taking it more routinely. Today pt reports she feels at goal. Is doing confident and doing things by herself again. Is sleeping well except one night when she missed the  "lorazepam (had a REM the next night). On discussion, she weaned the cymbalta routinely as she started to feel over medicated. Has taken it a couple of times prn.    The following portions of the patient's history were reviewed and updated as appropriate: allergies, past family history, past medical history, past social history, past surgical history and problem list.    Review of Systems   Cardiovascular: Negative for chest pain.   Gastrointestinal: Negative for abdominal distention and abdominal pain.   Skin: Negative for color change.   Neurological: Negative for tremors, speech difficulty and headaches.   Psychiatric/Behavioral: Negative for agitation and confusion.   All other systems reviewed and are negative.        Current Outpatient Prescriptions:   •  aspirin 81 MG tablet, Take  by mouth., Disp: , Rfl:   •  clonazePAM (KlonoPIN) 1 MG tablet, , Disp: , Rfl:   •  LORazepam (ATIVAN) 0.5 MG tablet, TAKE 1 TABLET BY MOUTH AT BEDTIME, Disp: , Rfl: 2  •  LORazepam (ATIVAN) 1 MG tablet, TAKE 1 TABLET BY MOUTH AT BEDTIME, Disp: , Rfl: 2  •  montelukast (SINGULAIR) 10 MG tablet, , Disp: , Rfl:   •  NIFEdipine CC (ADALAT CC) 60 MG 24 hr tablet, Take 1 tablet by mouth Daily. For blood pressure, Disp: 30 tablet, Rfl: 5  •  busPIRone (BUSPAR) 10 MG tablet, 1/2- 1 tab po q8hr prn mood, Disp: 60 tablet, Rfl: 0    Objective     /78   Pulse 64   Resp 16   Ht 154.9 cm (61\")   Wt 71.7 kg (158 lb)   SpO2 100%   BMI 29.85 kg/m²     Physical Exam   Constitutional: She is oriented to person, place, and time. She appears well-developed and well-nourished.   HENT:   Right Ear: Tympanic membrane and ear canal normal.   Left Ear: Tympanic membrane and ear canal normal.   Mouth/Throat: Oropharynx is clear and moist.   Eyes: Conjunctivae and EOM are normal. Pupils are equal, round, and reactive to light.   Neck: No thyromegaly present.   Cardiovascular: Normal rate and regular rhythm.    Pulmonary/Chest: Effort normal and " "breath sounds normal.   Neurological: She is alert and oriented to person, place, and time.   Skin: Skin is warm and dry.   Psychiatric: She has a normal mood and affect.   Vitals reviewed.      Assessment/Plan   Carolyn was seen today for anxiety and med refill.    Diagnoses and all orders for this visit:    Depression with anxiety  -     busPIRone (BUSPAR) 10 MG tablet; 1/2- 1 tab po q8hr prn mood        1. PSYCH- depression with anxiety- resolved since pt sleeping well again. Will remove cymbalta from her list. Will write for buspar for her to take \"as needed\" if she gets any serotonin dips but I except this will be rare as long as she takes the lorazepam routinely and sleeps well.   2. RECHECK- 6mo       "

## 2018-07-16 NOTE — PATIENT INSTRUCTIONS
"1. PSYCH- depression with anxiety- resolved since pt sleeping well again. Will remove cymbalta from her list. Will write for buspar for her to take \"as needed\" if she gets any serotonin dips but I except this will be rare as long as she takes the lorazepam routinely and sleeps well.   2. RECHECK- 6mo  "

## 2019-01-22 ENCOUNTER — OFFICE VISIT (OUTPATIENT)
Dept: INTERNAL MEDICINE | Facility: CLINIC | Age: 69
End: 2019-01-22

## 2019-01-22 VITALS
DIASTOLIC BLOOD PRESSURE: 78 MMHG | HEIGHT: 61 IN | SYSTOLIC BLOOD PRESSURE: 124 MMHG | TEMPERATURE: 97.4 F | BODY MASS INDEX: 31.26 KG/M2 | WEIGHT: 165.6 LBS

## 2019-01-22 DIAGNOSIS — E78.00 PURE HYPERCHOLESTEROLEMIA: ICD-10-CM

## 2019-01-22 DIAGNOSIS — I10 ESSENTIAL HYPERTENSION: Primary | ICD-10-CM

## 2019-01-22 DIAGNOSIS — F41.8 DEPRESSION WITH ANXIETY: ICD-10-CM

## 2019-01-22 DIAGNOSIS — M19.90 ARTHRITIS: ICD-10-CM

## 2019-01-22 PROCEDURE — 99213 OFFICE O/P EST LOW 20 MIN: CPT | Performed by: FAMILY MEDICINE

## 2019-01-22 RX ORDER — NIFEDIPINE 60 MG/1
60 TABLET, FILM COATED, EXTENDED RELEASE ORAL DAILY
Qty: 30 TABLET | Refills: 5 | Status: SHIPPED | OUTPATIENT
Start: 2019-01-22 | End: 2019-07-23 | Stop reason: SDUPTHER

## 2019-01-22 RX ORDER — LORAZEPAM 1 MG/1
1 TABLET ORAL
COMMUNITY
End: 2019-01-22

## 2019-01-22 RX ORDER — LORAZEPAM 0.5 MG/1
0.5 TABLET ORAL
COMMUNITY
End: 2019-01-22

## 2019-01-22 NOTE — PROGRESS NOTES
Subjective   Carolyn Snowden is a 68 y.o. female.     History of Present Illness   Here for routine 6mo. Last seen 7/16/18 for 2mo recheck. Was [place] 2[place]26[place]18 [place] [facility]. Was seen 12/1/15 as a new patient. Has “sleep REM” d/o, treated at a sleep center in [place]; takes lorazepam for this. Lab 5/15/18 dmeo , tg 185, HDL 78,  with diet. Lab 5/15/18 demo , tg 185, HDL 78, .  Lab 4/17/18 demo , tg 171, HDL 64, . Lab 2/26/18 demo normal CBC, CMP, TSH, free T4 and B12. Lipid was high with , tg 82, HDL 80, . Labs 1/10/17 demo normal CBC, CMP, TSH, B12 and lipid with , tg 80, HDL 92,  (previous 162, tg 57, HDL 76, LDL 58).      1.CV- HTN and hyperlipidemia. BP improved and pt was able to stop HCTZ/ Klorcon in 2016 and nifedipine in 2017. Had elevated lipid 3/13/18 with . Pt worked on diet and LDL improved to 147 on lab 4/17/18; further improved to 117 on lab 5/15/18. Today she reports no CP, palpitations, dyspnea or edema. However, on discussion, she is only taking the nifedipine qd-qod. Has been taking her BP qd and it has stayed stable even on the days she skips. Has not needed any HCTZ/ KCl.      2. PSYCH- memory loss/ depression with anxiety. At her [facility] 2/26/18 pt reported continued sleep issues (pt has sleep REM d/o, treated by sleep specialist in [place]). Was having trouble getting to sleep and then sleeping so hard she wet the bed. Had forgotten an entire day. Mini mental and MRI brain were normal. On discussion, pt was depressed. Occurs every year around January which is the anniversary of her 's death 2013. Pt was sad with some guilt, anhedonia, withdrawing, especially having decreased motivation, crying, having loose stools and memory problem. Did not respond to Lexapro but did well with Cymbalta. Was then able to stop it after she started taking her lorazepam more routinely and sleeping properly (had been trying to cut down). Was given buspar to  "add prn. Today she reports she is still taking the clonzepam (no lorazepam). Is sleeping well with no return of mood swings. Has not had to take any buspar.     3. DERM- today pt reports she went to the [facility] with blisters behind her left knee. Was diagnosed with shingles. Is resolved except scars now.  4. ORTHO- today pt reports she has plantar fasciitis on the right and OA in left knee with h/o scope. Had a parking sticker in [place] and it just . Does not need a cane other other device to walk now. Does not need any medication. May wear the boot for the plantar fasciitis on occasion.    The following portions of the patient's history were reviewed and updated as appropriate: current medications, past family history, past medical history, past social history, past surgical history and problem list.    Review of Systems   Cardiovascular: Negative for chest pain.   Gastrointestinal: Negative for abdominal distention and abdominal pain.   Skin: Negative for color change.   Neurological: Negative for tremors, speech difficulty and headaches.   Psychiatric/Behavioral: Negative for agitation and confusion.   All other systems reviewed and are negative.      Current Outpatient Medications:   •  aspirin 81 MG tablet, Take  by mouth., Disp: , Rfl:   •  clonazePAM (KlonoPIN) 1 MG tablet, , Disp: , Rfl:   •  NIFEdipine CC (ADALAT CC) 60 MG 24 hr tablet, Take 1 tablet by mouth Daily. For blood pressure, Disp: 30 tablet, Rfl: 5    Objective     /78   Temp 97.4 °F (36.3 °C)   Ht 154.9 cm (61\")   Wt 75.1 kg (165 lb 9.6 oz)   BMI 31.29 kg/m²     Physical Exam   Constitutional: She is oriented to person, place, and time. She appears well-developed and well-nourished.   HENT:   Right Ear: Tympanic membrane and ear canal normal.   Left Ear: Tympanic membrane and ear canal normal.   Mouth/Throat: Oropharynx is clear and moist.   Eyes: Conjunctivae and EOM are normal. Pupils are equal, round, and reactive to " light.   Neck: No thyromegaly present.   Cardiovascular: Normal rate and regular rhythm.   Pulmonary/Chest: Effort normal and breath sounds normal.   Neurological: She is alert and oriented to person, place, and time.   Skin: Skin is warm and dry.   Psychiatric: She has a normal mood and affect.   Vitals reviewed.      Assessment/Plan   Carolyn was seen today for follow-up.    Diagnoses and all orders for this visit:    Essential hypertension  -     NIFEdipine CC (ADALAT CC) 60 MG 24 hr tablet; Take 1 tablet by mouth Daily. For blood pressure    Pure hypercholesterolemia    Depression with anxiety      1. CV- HTN, hyperlipid- BP at goal but discussed the concerns with missed doses of a calcium channel blocker (nifedipine) causing issues with vasospasm which is a stroke risk. Pt will return to daily doses. Will do her labs at her [facility] [place].  2. PSYCH- mood resolved with pt getting appropriate sleep. Will remove the lorazepam from her list and keep just the clonazepam (writtten by sleep docter).  3. ORTHO- arthritis- discussed that at this time she does not qualify for a parking decal.  4. RECHECK- 6mo for [facility] [place] (including labs) with Dr Zamora

## 2019-01-22 NOTE — PATIENT INSTRUCTIONS
1. CV- HTN, hyperlipid- BP at goal but discussed the concerns with missed doses of a calcium channel blocker (nifedipine) causing issues with vasospasm which is a stroke risk. Pt will return to daily doses. [place] [facility]  2. PSYCH- mood resolved with pt getting appropriate sleep. Will remove the lorazepam from her list and keep just the clonazepam (writtten by sleep docter).  3. ORTHO- arthritis- discussed that at this time she does not qualify for a parking decal.  4. RECHECK- 6mo [place] [facility] [place] Zamora

## 2019-07-23 ENCOUNTER — OFFICE VISIT (OUTPATIENT)
Dept: INTERNAL MEDICINE | Facility: CLINIC | Age: 69
End: 2019-07-23

## 2019-07-23 VITALS
SYSTOLIC BLOOD PRESSURE: 130 MMHG | HEART RATE: 71 BPM | BODY MASS INDEX: 30.4 KG/M2 | WEIGHT: 161 LBS | TEMPERATURE: 97.4 F | HEIGHT: 61 IN | DIASTOLIC BLOOD PRESSURE: 90 MMHG | RESPIRATION RATE: 16 BRPM | OXYGEN SATURATION: 98 %

## 2019-07-23 DIAGNOSIS — Z00.00 MEDICARE ANNUAL WELLNESS VISIT, SUBSEQUENT: Primary | ICD-10-CM

## 2019-07-23 DIAGNOSIS — E78.00 PURE HYPERCHOLESTEROLEMIA: ICD-10-CM

## 2019-07-23 DIAGNOSIS — Z11.59 NEED FOR HEPATITIS C SCREENING TEST: ICD-10-CM

## 2019-07-23 DIAGNOSIS — E66.09 CLASS 1 OBESITY DUE TO EXCESS CALORIES WITH SERIOUS COMORBIDITY AND BODY MASS INDEX (BMI) OF 31.0 TO 31.9 IN ADULT: ICD-10-CM

## 2019-07-23 DIAGNOSIS — I10 ESSENTIAL HYPERTENSION: ICD-10-CM

## 2019-07-23 DIAGNOSIS — Z13.31 POSITIVE DEPRESSION SCREENING: ICD-10-CM

## 2019-07-23 DIAGNOSIS — L30.9 DERMATITIS: ICD-10-CM

## 2019-07-23 LAB
ALBUMIN SERPL-MCNC: 4.4 G/DL (ref 3.5–5.2)
ALBUMIN/GLOB SERPL: 1.5 G/DL
ALP SERPL-CCNC: 56 U/L (ref 39–117)
ALT SERPL W P-5'-P-CCNC: 12 U/L (ref 1–33)
ANION GAP SERPL CALCULATED.3IONS-SCNC: 13.3 MMOL/L (ref 5–15)
AST SERPL-CCNC: 18 U/L (ref 1–32)
BASOPHILS # BLD AUTO: 0.04 10*3/MM3 (ref 0–0.2)
BASOPHILS NFR BLD AUTO: 0.8 % (ref 0–1.5)
BILIRUB SERPL-MCNC: 0.4 MG/DL (ref 0.2–1.2)
BUN BLD-MCNC: 12 MG/DL (ref 8–23)
BUN/CREAT SERPL: 16.2 (ref 7–25)
CALCIUM SPEC-SCNC: 9.2 MG/DL (ref 8.6–10.5)
CHLORIDE SERPL-SCNC: 104 MMOL/L (ref 98–107)
CHOLEST SERPL-MCNC: 219 MG/DL (ref 0–200)
CO2 SERPL-SCNC: 25.7 MMOL/L (ref 22–29)
CREAT BLD-MCNC: 0.74 MG/DL (ref 0.57–1)
DEPRECATED RDW RBC AUTO: 48.3 FL (ref 37–54)
EOSINOPHIL # BLD AUTO: 0.11 10*3/MM3 (ref 0–0.4)
EOSINOPHIL NFR BLD AUTO: 2.2 % (ref 0.3–6.2)
ERYTHROCYTE [DISTWIDTH] IN BLOOD BY AUTOMATED COUNT: 14.8 % (ref 12.3–15.4)
GFR SERPL CREATININE-BSD FRML MDRD: 94 ML/MIN/1.73
GLOBULIN UR ELPH-MCNC: 2.9 GM/DL
GLUCOSE BLD-MCNC: 74 MG/DL (ref 65–99)
HCT VFR BLD AUTO: 40.1 % (ref 34–46.6)
HCV AB SER DONR QL: NORMAL
HDLC SERPL-MCNC: 71 MG/DL (ref 40–60)
HGB BLD-MCNC: 12.3 G/DL (ref 12–15.9)
IMM GRANULOCYTES # BLD AUTO: 0.01 10*3/MM3 (ref 0–0.05)
IMM GRANULOCYTES NFR BLD AUTO: 0.2 % (ref 0–0.5)
LDLC SERPL CALC-MCNC: 132 MG/DL (ref 0–100)
LDLC/HDLC SERPL: 1.85 {RATIO}
LYMPHOCYTES # BLD AUTO: 2.19 10*3/MM3 (ref 0.7–3.1)
LYMPHOCYTES NFR BLD AUTO: 44.8 % (ref 19.6–45.3)
MCH RBC QN AUTO: 27.2 PG (ref 26.6–33)
MCHC RBC AUTO-ENTMCNC: 30.7 G/DL (ref 31.5–35.7)
MCV RBC AUTO: 88.7 FL (ref 79–97)
MONOCYTES # BLD AUTO: 0.46 10*3/MM3 (ref 0.1–0.9)
MONOCYTES NFR BLD AUTO: 9.4 % (ref 5–12)
NEUTROPHILS # BLD AUTO: 2.08 10*3/MM3 (ref 1.7–7)
NEUTROPHILS NFR BLD AUTO: 42.6 % (ref 42.7–76)
NRBC BLD AUTO-RTO: 0 /100 WBC (ref 0–0.2)
PLATELET # BLD AUTO: 251 10*3/MM3 (ref 140–450)
PMV BLD AUTO: 12 FL (ref 6–12)
POTASSIUM BLD-SCNC: 3.9 MMOL/L (ref 3.5–5.2)
PROT SERPL-MCNC: 7.3 G/DL (ref 6–8.5)
RBC # BLD AUTO: 4.52 10*6/MM3 (ref 3.77–5.28)
SODIUM BLD-SCNC: 143 MMOL/L (ref 136–145)
TRIGL SERPL-MCNC: 82 MG/DL (ref 0–150)
VLDLC SERPL-MCNC: 16.4 MG/DL (ref 5–40)
WBC NRBC COR # BLD: 4.89 10*3/MM3 (ref 3.4–10.8)

## 2019-07-23 PROCEDURE — 80053 COMPREHEN METABOLIC PANEL: CPT | Performed by: NURSE PRACTITIONER

## 2019-07-23 PROCEDURE — 85025 COMPLETE CBC W/AUTO DIFF WBC: CPT | Performed by: NURSE PRACTITIONER

## 2019-07-23 PROCEDURE — G0439 PPPS, SUBSEQ VISIT: HCPCS | Performed by: NURSE PRACTITIONER

## 2019-07-23 PROCEDURE — 80061 LIPID PANEL: CPT | Performed by: NURSE PRACTITIONER

## 2019-07-23 PROCEDURE — 86803 HEPATITIS C AB TEST: CPT | Performed by: NURSE PRACTITIONER

## 2019-07-23 RX ORDER — NIFEDIPINE 60 MG/1
60 TABLET, FILM COATED, EXTENDED RELEASE ORAL DAILY
Qty: 30 TABLET | Refills: 5 | Status: SHIPPED | OUTPATIENT
Start: 2019-07-23 | End: 2020-01-30 | Stop reason: SDUPTHER

## 2019-07-23 RX ORDER — TRIAMCINOLONE ACETONIDE 1 MG/G
CREAM TOPICAL 2 TIMES DAILY
Qty: 15 G | Refills: 0 | Status: SHIPPED | OUTPATIENT
Start: 2019-07-23 | End: 2020-01-30

## 2019-07-23 RX ORDER — NIFEDIPINE 60 MG/1
60 TABLET, EXTENDED RELEASE ORAL DAILY
COMMUNITY
End: 2019-07-23

## 2019-07-23 NOTE — PROGRESS NOTES
The ABCs of the Annual Wellness Visit  Subsequent Medicare Wellness Visit    Chief Complaint   Patient presents with   • Annual Exam       Subjective      She does report intermittent depression and grief after the death of her - she feels she is able to manage it by herself.  Denies SI/HI.   She also reports a rash on her right arm that came up a few weeks ago.  She has not changed products or tried new food or medicine.  She does use a thick hydrating lotion.   History of Present Illness:  Carolyn Snowden is a 68 y.o. female who presents for a Subsequent Medicare Wellness Visit.    HEALTH RISK ASSESSMENT    Recent Hospitalizations:  No hospitalization(s) within the last year.    Current Medical Providers:  Patient Care Team:  Alaina Olguin APRN as PCP - General (Nurse Practitioner)  Ana Luisa Gauthier MD as PCP - Family Medicine  Ana Luisa Gauthier MD as PCP - Claims Attributed    Smoking Status:  Social History     Tobacco Use   Smoking Status Former Smoker   Smokeless Tobacco Never Used       Alcohol Consumption:  Social History     Substance and Sexual Activity   Alcohol Use Yes    Comment: occasional       Depression Screen:   PHQ-2/PHQ-9 Depression Screening 7/23/2019   Little interest or pleasure in doing things 1   Feeling down, depressed, or hopeless 1   Trouble falling or staying asleep, or sleeping too much 0   Feeling tired or having little energy 0   Poor appetite or overeating 1   Feeling bad about yourself - or that you are a failure or have let yourself or your family down 0   Trouble concentrating on things, such as reading the newspaper or watching television 1   Moving or speaking so slowly that other people could have noticed. Or the opposite - being so fidgety or restless that you have been moving around a lot more than usual 1   Thoughts that you would be better off dead, or of hurting yourself in some way 1   Total Score 6   If you checked off any problems, how difficult have  these problems made it for you to do your work, take care of things at home, or get along with other people? Not difficult at all       Fall Risk Screen:  HESHAM Fall Risk Assessment was completed, and patient is at HIGH risk for falls. Assessment completed on:7/23/2019    Health Habits and Functional and Cognitive Screening:  Functional & Cognitive Status 7/23/2019   Do you have difficulty preparing food and eating? No   Do you have difficulty bathing yourself, getting dressed or grooming yourself? No   Do you have difficulty using the toilet? No   Do you have difficulty moving around from place to place? No   Do you have trouble with steps or getting out of a bed or a chair? No   Current Diet Well Balanced Diet   Dental Exam Not up to date   Eye Exam Not up to date   Exercise (times per week) 2 times per week   Current Exercise Activities Include Walking   Do you need help using the phone?  No   Are you deaf or do you have serious difficulty hearing?  No   Do you need help with transportation? No   Do you need help shopping? No   Do you need help preparing meals?  No   Do you need help with housework?  No   Do you need help with laundry? No   Do you need help taking your medications? No   Do you need help managing money? No   Do you ever drive or ride in a car without wearing a seat belt? No   Have you felt unusual stress, anger or loneliness in the last month? No   Who do you live with? Alone   If you need help, do you have trouble finding someone available to you? No   Have you been bothered in the last four weeks by sexual problems? No   Do you have difficulty concentrating, remembering or making decisions? Yes         Does the patient have evidence of cognitive impairment? No    Asprin use counseling:Taking ASA appropriately as indicated    Age-appropriate Screening Schedule:  Refer to the list below for future screening recommendations based on patient's age, sex and/or medical conditions. Orders for these  recommended tests are listed in the plan section. The patient has been provided with a written plan.    Health Maintenance   Topic Date Due   • LIPID PANEL  04/17/2019   • TDAP/TD VACCINES (1 - Tdap) 07/23/2019 (Originally 8/2/1969)   • ZOSTER VACCINE (2 of 2) 07/23/2019 (Originally 4/23/2018)   • INFLUENZA VACCINE  08/01/2019   • MAMMOGRAM  12/04/2019   • COLONOSCOPY  01/01/2022   • PNEUMOCOCCAL VACCINES (65+ LOW/MEDIUM RISK)  Discontinued          The following portions of the patient's history were reviewed and updated as appropriate: allergies, current medications, past family history, past medical history, past social history, past surgical history and problem list.    Outpatient Medications Prior to Visit   Medication Sig Dispense Refill   • aspirin 81 MG tablet Take  by mouth.     • clonazePAM (KlonoPIN) 1 MG tablet      • FERROUS SULFATE PO Take  by mouth.     • NIFEdipine CC (ADALAT CC) 60 MG 24 hr tablet Take 1 tablet by mouth Daily. For blood pressure 30 tablet 5   • aspirin 81 MG tablet Take 81 mg by mouth Daily.     • NIFEdipine XL (PROCARDIA XL) 60 MG 24 hr tablet Take 60 mg by mouth Daily.       No facility-administered medications prior to visit.        Patient Active Problem List   Diagnosis   • Atopic rhinitis   • Diverticulitis of intestine   • Gastroesophageal reflux disease   • Essential hypertension   • REM sleep behavior disorder   • Pure hypercholesterolemia   • Arthritis   • REM sleep behavior disorder   • Esophageal reflux   • Essential hypertension   • Intrinsic asthma without status asthmaticus without complication       Advanced Care Planning:  Patient does not have an advance directive - information provided to the patient today    Review of Systems   Constitutional: Negative for activity change, appetite change, fatigue and unexpected weight change.   HENT: Negative for congestion, ear pain, rhinorrhea, sinus pressure, sore throat and trouble swallowing.    Eyes: Negative for pain and  "visual disturbance.   Respiratory: Negative for cough, chest tightness, shortness of breath and wheezing.    Cardiovascular: Negative for chest pain, palpitations and leg swelling.   Gastrointestinal: Negative for abdominal pain, blood in stool, constipation, diarrhea, nausea and vomiting.   Endocrine: Negative for cold intolerance, heat intolerance, polydipsia and polyphagia.   Genitourinary: Negative for dysuria, frequency, hematuria, menstrual problem, urgency and vaginal discharge.   Musculoskeletal: Positive for arthralgias. Negative for back pain, joint swelling and myalgias.   Skin: Positive for rash. Negative for color change, pallor and wound.   Allergic/Immunologic: Negative for environmental allergies, food allergies and immunocompromised state.   Neurological: Negative for dizziness, tremors, seizures, syncope, facial asymmetry, speech difficulty, weakness, numbness and headaches.   Hematological: Negative for adenopathy. Does not bruise/bleed easily.   Psychiatric/Behavioral: Positive for dysphoric mood. Negative for decreased concentration, sleep disturbance and suicidal ideas. The patient is not nervous/anxious.        Compared to one year ago, the patient feels her physical health is the same.  Compared to one year ago, the patient feels her mental health is worse. - more forgetful    Reviewed chart for potential of high risk medication in the elderly: yes  Reviewed chart for potential of harmful drug interactions in the elderly:yes    Objective         Vitals:    07/23/19 1131   BP: 130/90   Pulse: 71   Resp: 16   Temp: 97.4 °F (36.3 °C)   TempSrc: Temporal   SpO2: 98%   Weight: 73 kg (161 lb)   Height: 154.9 cm (61\")   PainSc: 0-No pain       Body mass index is 30.42 kg/m².  Discussed the patient's BMI with her. The BMI is above average; BMI management plan is completed.    Physical Exam   Constitutional: She is oriented to person, place, and time. She appears well-developed and well-nourished. She " is cooperative. No distress.   HENT:   Head: Normocephalic.   Right Ear: Tympanic membrane and external ear normal.   Left Ear: Tympanic membrane and external ear normal.   Nose: Nose normal. Right sinus exhibits no maxillary sinus tenderness and no frontal sinus tenderness. Left sinus exhibits no maxillary sinus tenderness and no frontal sinus tenderness.   Mouth/Throat: Oropharynx is clear and moist and mucous membranes are normal. No oropharyngeal exudate.   Eyes: Conjunctivae and EOM are normal. Pupils are equal, round, and reactive to light. No scleral icterus.   Neck: Normal range of motion. Neck supple. Carotid bruit is not present. No neck rigidity. No tracheal deviation present. No thyroid mass and no thyromegaly present.   Cardiovascular: Normal rate, regular rhythm, normal heart sounds and intact distal pulses. Exam reveals no gallop and no friction rub.   No murmur heard.  Pulmonary/Chest: Effort normal and breath sounds normal. No respiratory distress. She has no wheezes. She has no rales.   Abdominal: Soft. Normal appearance and bowel sounds are normal. She exhibits no distension and no mass. There is no hepatosplenomegaly. There is no tenderness. There is no rebound and no guarding. No hernia.   Musculoskeletal: Normal range of motion. She exhibits no edema, tenderness or deformity.   ROM normal with all major joints   Lymphadenopathy:        Head (right side): No submental, no submandibular, no tonsillar, no preauricular, no posterior auricular and no occipital adenopathy present.        Head (left side): No submental, no submandibular, no tonsillar, no preauricular, no posterior auricular and no occipital adenopathy present.     She has no cervical adenopathy.        Right cervical: No superficial cervical, no deep cervical and no posterior cervical adenopathy present.       Left cervical: No superficial cervical, no deep cervical and no posterior cervical adenopathy present.   Neurological: She is  alert and oriented to person, place, and time. She displays no atrophy and no tremor. No cranial nerve deficit or sensory deficit. She exhibits normal muscle tone. Coordination and gait normal. GCS eye subscore is 4. GCS verbal subscore is 5. GCS motor subscore is 6.   Skin: Skin is warm and dry. Capillary refill takes 2 to 3 seconds. Rash noted. She is not diaphoretic. No cyanosis. Nails show no clubbing.        Psychiatric: She has a normal mood and affect. Her speech is normal and behavior is normal. Judgment and thought content normal. Cognition and memory are normal.   Nursing note and vitals reviewed.            Assessment/Plan   Medicare Risks and Personalized Health Plan  CMS Preventative Services Quick Reference  Advance Directive Discussion  Immunizations Discussed/Encouraged (specific immunizations; Td and Shingrix )  Obesity/Overweight     The above risks/problems have been discussed with the patient.  Pertinent information has been shared with the patient in the After Visit Summary.  Follow up plans and orders are seen below in the Assessment/Plan Section.    Diagnoses and all orders for this visit:    1. Medicare annual wellness visit, subsequent (Primary)  -     CBC & Differential  -     Comprehensive Metabolic Panel  -     CBC Auto Differential  Counseled regarding: age-appropriate screening labs and tests, wearing seatbelt and sunscreen regularly  Discussed: regular exercise and diet changes to promote healthy weight, checking skin regularly for abnormal moles and lesions    2. Essential hypertension  -     CBC & Differential  -     Comprehensive Metabolic Panel  -     NIFEdipine CC (ADALAT CC) 60 MG 24 hr tablet; Take 1 tablet by mouth Daily. For blood pressure  Dispense: 30 tablet; Refill: 5  -     CBC Auto Differential  -  PT fsating- has not taken medication today- FU in 6 months  3. Pure hypercholesterolemia  -     Lipid Panel    4. Class 1 obesity due to excess calories with serious  comorbidity and body mass index (BMI) of 31.0 to 31.9 in adult    5. Need for hepatitis C screening test  -     Hepatitis C Antibody    6. Positive depression screening  - pt reports she is able to manage this on her own- it only happens around holidays and birthdays- she misses her   7. Dermatitis  -     triamcinolone (KENALOG) 0.1 % cream; Apply  topically to the appropriate area as directed 2 (Two) Times a Day.  Dispense: 15 g; Refill: 0       -   Advised to exfoliate area and continue with lotion  Other orders  -     Cancel: TSH  -     Cancel: Hemoglobin A1c      Follow Up:  Return in about 6 months (around 1/23/2020) for Recheck.     An After Visit Summary and PPPS were given to the patient.

## 2019-07-23 NOTE — PATIENT INSTRUCTIONS

## 2019-11-07 ENCOUNTER — OFFICE VISIT (OUTPATIENT)
Dept: INTERNAL MEDICINE | Facility: CLINIC | Age: 69
End: 2019-11-07

## 2019-11-07 VITALS
BODY MASS INDEX: 30.78 KG/M2 | HEART RATE: 70 BPM | DIASTOLIC BLOOD PRESSURE: 84 MMHG | WEIGHT: 163 LBS | HEIGHT: 61 IN | TEMPERATURE: 98.2 F | RESPIRATION RATE: 20 BRPM | SYSTOLIC BLOOD PRESSURE: 132 MMHG | OXYGEN SATURATION: 99 %

## 2019-11-07 DIAGNOSIS — B96.89 BACTERIAL SKIN INFECTION: Primary | ICD-10-CM

## 2019-11-07 DIAGNOSIS — L08.9 BACTERIAL SKIN INFECTION: Primary | ICD-10-CM

## 2019-11-07 DIAGNOSIS — L82.1 SEBORRHEIC KERATOSIS: ICD-10-CM

## 2019-11-07 PROCEDURE — 99213 OFFICE O/P EST LOW 20 MIN: CPT | Performed by: NURSE PRACTITIONER

## 2019-11-07 NOTE — PROGRESS NOTES
Subjective   Carolyn Snowden is a 69 y.o. female here today for rash.    Chief Complaint   Patient presents with   • Rash      normal mammo in August rash was present on breast. Complains of a  cyst that ruptured in the same area a few weeks ago.   Varsha is here today with a spot on her breast and another spot on her chest that she is concerned about.  The spot on her breast has been present for some time and does not bother her.  The spot on her chest developed a blister-went away and another blister erupted.  This is been going off and on for about 2 weeks.  She has been applying triamcinolone cream on this and it has not changed.  It does itch and hurt.  Had some clear drainage.    Review of Systems   Constitutional: Negative.    Respiratory: Negative.    Cardiovascular: Negative.    Endocrine: Negative.    Musculoskeletal: Negative for arthralgias, gait problem, joint swelling and myalgias.   Skin: Positive for rash and skin lesions. Negative for color change, pallor and bruise.   Hematological: Negative for adenopathy.       Past Medical History:   Diagnosis Date   • Acute angina (CMS/HCC)      Family History   Problem Relation Age of Onset   • Liver disease Other    • Hypertension Other    • Heart disease Other    • Stroke Other    • Heart attack Other    • Liver disease Father      History reviewed. No pertinent surgical history.  Social History     Socioeconomic History   • Marital status:      Spouse name: Not on file   • Number of children: Not on file   • Years of education: Not on file   • Highest education level: Not on file   Tobacco Use   • Smoking status: Former Smoker   • Smokeless tobacco: Never Used   Substance and Sexual Activity   • Alcohol use: Yes     Comment: occasional         Current Outpatient Medications:   •  aspirin 81 MG tablet, Take  by mouth., Disp: , Rfl:   •  clonazePAM (KlonoPIN) 1 MG tablet, , Disp: , Rfl:   •  FERROUS SULFATE PO, Take  by mouth., Disp: , Rfl:   •   "NIFEdipine CC (ADALAT CC) 60 MG 24 hr tablet, Take 1 tablet by mouth Daily. For blood pressure, Disp: 30 tablet, Rfl: 5  •  triamcinolone (KENALOG) 0.1 % cream, Apply  topically to the appropriate area as directed 2 (Two) Times a Day., Disp: 15 g, Rfl: 0  •  mupirocin (BACTROBAN) 2 % ointment, Apply  topically to the appropriate area as directed 2 (Two) Times a Day. For 7 days, Disp: 15 g, Rfl: 0    Objective   Vitals:    11/07/19 1108   BP: 132/84   Pulse: 70   Resp: 20   Temp: 98.2 °F (36.8 °C)   TempSrc: Temporal   SpO2: 99%   Weight: 73.9 kg (163 lb)   Height: 154.9 cm (61\")     Body mass index is 30.8 kg/m².  Physical Exam   Constitutional: She is oriented to person, place, and time. She appears well-developed and well-nourished. No distress.   Pulmonary/Chest: Effort normal. No accessory muscle usage. No respiratory distress.   Neurological: She is alert and oriented to person, place, and time.   Skin: No erythema. No pallor.        Psychiatric: She has a normal mood and affect. Her speech is normal and behavior is normal. Judgment and thought content normal.   Nursing note and vitals reviewed.      Assessment/Plan   Problem List Items Addressed This Visit     None      Visit Diagnoses     Bacterial skin infection    -  Primary    Relevant Medications    mupirocin (BACTROBAN) 2 % ointment    Seborrheic keratosis        Relevant Medications    mupirocin (BACTROBAN) 2 % ointment        Carolyn was seen today for rash.    Diagnoses and all orders for this visit:    Bacterial skin infection  -     mupirocin (BACTROBAN) 2 % ointment; Apply  topically to the appropriate area as directed 2 (Two) Times a Day. For 7 days  Likely staph infection.  Will treat with Bactroban twice a day for 7 days.  Seborrheic keratosis  Reassured this is benign.  Should she like removal I can refer her to a dermatologist           The patient was counseled regarding diagnostic results, impressions, prognosis, instructions for " management, risk factor reductions, education, and importance of treatment compliance.  The patient verbalized understanding of and agreement with the plan of care.    Advised patient to call with any further questions and any new or worsening symptoms.     Return for Next scheduled follow up.      JEAN-CLAUDE Daley    Please note that portions of this note were completed with a voice recognition program. Efforts were made to edit the dictations, but occasionally words are mistranscribed.

## 2020-01-30 ENCOUNTER — OFFICE VISIT (OUTPATIENT)
Dept: INTERNAL MEDICINE | Facility: CLINIC | Age: 70
End: 2020-01-30

## 2020-01-30 VITALS
OXYGEN SATURATION: 98 % | HEART RATE: 73 BPM | HEIGHT: 61 IN | SYSTOLIC BLOOD PRESSURE: 120 MMHG | WEIGHT: 167 LBS | DIASTOLIC BLOOD PRESSURE: 78 MMHG | BODY MASS INDEX: 31.53 KG/M2 | TEMPERATURE: 97.4 F | RESPIRATION RATE: 20 BRPM

## 2020-01-30 DIAGNOSIS — Z78.0 POSTMENOPAUSAL: ICD-10-CM

## 2020-01-30 DIAGNOSIS — L82.1 SEBORRHEIC KERATOSIS: ICD-10-CM

## 2020-01-30 DIAGNOSIS — I10 ESSENTIAL HYPERTENSION: Primary | ICD-10-CM

## 2020-01-30 PROCEDURE — 99214 OFFICE O/P EST MOD 30 MIN: CPT | Performed by: NURSE PRACTITIONER

## 2020-01-30 RX ORDER — NIFEDIPINE 60 MG/1
60 TABLET, FILM COATED, EXTENDED RELEASE ORAL DAILY
Qty: 30 TABLET | Refills: 5 | Status: SHIPPED | OUTPATIENT
Start: 2020-01-30 | End: 2020-07-01 | Stop reason: SDUPTHER

## 2020-01-30 NOTE — PROGRESS NOTES
Subjective   Carolyn Snowden is a 69 y.o. female here today for HTN.    Chief Complaint   Patient presents with   • Hypertension   Carolyn is here today for follow-up on hypertension.  She had been compliant with her medication and denies adverse side effects.  She denies any chest pain, shortness of breath, lower extremity edema.  She is eating and drinking well.  Normal bowel habits, no blood in stool.  The skin lesion she showed me a last visit appears to be changing and growing.    Review of Systems   Constitutional: Negative for diaphoresis, fatigue, unexpected weight gain and unexpected weight loss.   HENT: Negative for nosebleeds and trouble swallowing.    Eyes: Negative for blurred vision and visual disturbance.   Respiratory: Negative for chest tightness and shortness of breath.    Cardiovascular: Negative for chest pain, palpitations and leg swelling.   Gastrointestinal: Negative for blood in stool, nausea and vomiting.   Skin: Negative for rash and skin lesions.   Neurological: Negative for dizziness, syncope, facial asymmetry, light-headedness, headache, memory problem and confusion.       Past Medical History:   Diagnosis Date   • Acute angina (CMS/HCC)      Family History   Problem Relation Age of Onset   • Liver disease Other    • Hypertension Other    • Heart disease Other    • Stroke Other    • Heart attack Other    • Liver disease Father      History reviewed. No pertinent surgical history.  Social History     Socioeconomic History   • Marital status:      Spouse name: Not on file   • Number of children: Not on file   • Years of education: Not on file   • Highest education level: Not on file   Tobacco Use   • Smoking status: Former Smoker   • Smokeless tobacco: Never Used   Substance and Sexual Activity   • Alcohol use: Yes     Comment: occasional         Current Outpatient Medications:   •  aspirin 81 MG tablet, Take  by mouth., Disp: , Rfl:   •  clonazePAM (KlonoPIN) 1 MG tablet, ,  "Disp: , Rfl:   •  FERROUS SULFATE PO, Take  by mouth., Disp: , Rfl:   •  NIFEdipine CC (ADALAT CC) 60 MG 24 hr tablet, Take 1 tablet by mouth Daily. For blood pressure, Disp: 30 tablet, Rfl: 5    Objective   Vitals:    01/30/20 1112   BP: 120/78   Pulse: 73   Resp: 20   Temp: 97.4 °F (36.3 °C)   TempSrc: Temporal   SpO2: 98%   Weight: 75.8 kg (167 lb)   Height: 154.9 cm (61\")     Body mass index is 31.55 kg/m².  Physical Exam   Constitutional: She is oriented to person, place, and time. She appears well-developed and well-nourished. No distress.   HENT:   Head: Normocephalic and atraumatic.   Eyes: Pupils are equal, round, and reactive to light.   Neck: Neck supple. No thyromegaly present.   Cardiovascular: Normal rate and regular rhythm.   Pulmonary/Chest: Effort normal and breath sounds normal.   Abdominal: Soft. Bowel sounds are normal. She exhibits no distension.   Neurological: She is alert and oriented to person, place, and time.   Skin: Skin is warm and dry. Capillary refill takes 2 to 3 seconds. She is not diaphoretic.   Psychiatric: She has a normal mood and affect. Her behavior is normal. Judgment and thought content normal.   Nursing note and vitals reviewed.      Assessment/Plan   Problem List Items Addressed This Visit        Cardiovascular and Mediastinum    Essential hypertension - Primary    Relevant Medications    NIFEdipine CC (ADALAT CC) 60 MG 24 hr tablet      Other Visit Diagnoses     Postmenopausal        Relevant Orders    DEXA Bone Density Axial    Seborrheic keratosis        Relevant Orders    Ambulatory Referral to Dermatology        Carolyn was seen today for hypertension.    Diagnoses and all orders for this visit:    Essential hypertension  -     NIFEdipine CC (ADALAT CC) 60 MG 24 hr tablet; Take 1 tablet by mouth Daily. For blood pressure  -     Stable, continue current treatment plan  Postmenopausal  -     DEXA Bone Density Axial; Future    Seborrheic keratosis  -     Ambulatory " Referral to Dermatology  Seborrheic keratosis with changes, will refer to Derm for biopsy           The patient was counseled regarding diagnostic results, impressions, prognosis, instructions for management, risk factor reductions, education, and importance of treatment compliance.  The patient verbalized understanding of and agreement with the plan of care.    Advised patient to call with any further questions and any new or worsening symptoms.     Return for Medicare Wellness with next visit.      Alaina Olguin, JEAN-CLAUDE    Please note that portions of this note were completed with a voice recognition program. Efforts were made to edit the dictations, but occasionally words are mistranscribed.

## 2020-05-28 ENCOUNTER — APPOINTMENT (OUTPATIENT)
Dept: BONE DENSITY | Facility: HOSPITAL | Age: 70
End: 2020-05-28

## 2020-07-01 ENCOUNTER — OFFICE VISIT (OUTPATIENT)
Dept: INTERNAL MEDICINE | Facility: CLINIC | Age: 70
End: 2020-07-01

## 2020-07-01 VITALS
SYSTOLIC BLOOD PRESSURE: 134 MMHG | TEMPERATURE: 98.6 F | HEART RATE: 67 BPM | RESPIRATION RATE: 16 BRPM | WEIGHT: 159 LBS | DIASTOLIC BLOOD PRESSURE: 80 MMHG | BODY MASS INDEX: 30.02 KG/M2 | HEIGHT: 61 IN | OXYGEN SATURATION: 98 %

## 2020-07-01 DIAGNOSIS — I10 ESSENTIAL HYPERTENSION: ICD-10-CM

## 2020-07-01 DIAGNOSIS — N64.4 BREAST PAIN, LEFT: Primary | ICD-10-CM

## 2020-07-01 DIAGNOSIS — D22.5 IRRITATED NEVUS OF BACK: ICD-10-CM

## 2020-07-01 PROCEDURE — 99214 OFFICE O/P EST MOD 30 MIN: CPT | Performed by: NURSE PRACTITIONER

## 2020-07-01 RX ORDER — NIFEDIPINE 60 MG/1
60 TABLET, FILM COATED, EXTENDED RELEASE ORAL DAILY
Qty: 30 TABLET | Refills: 5 | Status: SHIPPED | OUTPATIENT
Start: 2020-07-01 | End: 2021-02-25 | Stop reason: SDUPTHER

## 2020-07-01 NOTE — PROGRESS NOTES
Subjective   Carolyn Snowden is a 69 y.o. female here today for left breast pain    Chief Complaint   Patient presents with   • Breast Pain   • Abscess     back   Carolyn is here today with sharp shooting left breast pain that has been going on for about 1 month.  The pain will be so severe it takes her breath away.  It occasionally happens in the right breast but more often and worse in the left.  Seems to be coming from her nipple.  She does routine breast exams and she is not able to identify a mass.  Her last mammogram was in February.  She also reports noticing a lesion on the middle of her upper back.  It is slightly painful.  Is also itchy.    Review of Systems   Constitutional: Negative for diaphoresis, fatigue, unexpected weight gain and unexpected weight loss.   HENT: Negative for nosebleeds and trouble swallowing.    Eyes: Negative for blurred vision and visual disturbance.   Respiratory: Negative for chest tightness and shortness of breath.    Cardiovascular: Negative for chest pain, palpitations and leg swelling.   Gastrointestinal: Negative for blood in stool, nausea and vomiting.   Genitourinary: Positive for breast pain. Negative for breast discharge and breast lump.   Skin: Negative for rash and skin lesions.   Neurological: Negative for dizziness, syncope, facial asymmetry, light-headedness, headache, memory problem and confusion.       Past Medical History:   Diagnosis Date   • Acute angina (CMS/HCC)      Family History   Problem Relation Age of Onset   • Liver disease Other    • Hypertension Other    • Heart disease Other    • Stroke Other    • Heart attack Other    • Liver disease Father      History reviewed. No pertinent surgical history.  Social History     Socioeconomic History   • Marital status:      Spouse name: Not on file   • Number of children: Not on file   • Years of education: Not on file   • Highest education level: Not on file   Tobacco Use   • Smoking status: Former  "Smoker   • Smokeless tobacco: Never Used   Substance and Sexual Activity   • Alcohol use: Yes     Comment: occasional         Current Outpatient Medications:   •  aspirin 81 MG tablet, Take  by mouth., Disp: , Rfl:   •  clonazePAM (KlonoPIN) 1 MG tablet, , Disp: , Rfl:   •  Coenzyme Q10-Vitamin E 100-1 MG-UNT/5ML syrup, Take  by mouth., Disp: , Rfl:   •  FERROUS SULFATE PO, Take  by mouth., Disp: , Rfl:   •  NIFEdipine CC (ADALAT CC) 60 MG 24 hr tablet, Take 1 tablet by mouth Daily. For blood pressure, Disp: 30 tablet, Rfl: 5    Objective   Vitals:    07/01/20 1014   BP: 134/80   Pulse: 67   Resp: 16   Temp: 98.6 °F (37 °C)   TempSrc: Temporal   SpO2: 98%   Weight: 72.1 kg (159 lb)   Height: 154.9 cm (61\")     Body mass index is 30.04 kg/m².  Physical Exam   Constitutional: She is oriented to person, place, and time. She appears well-developed and well-nourished. No distress.   Pulmonary/Chest: Effort normal. No accessory muscle usage. No respiratory distress.   Neurological: She is alert and oriented to person, place, and time.   Skin: No erythema. No pallor.        Psychiatric: She has a normal mood and affect. Her speech is normal and behavior is normal. Judgment and thought content normal.   Nursing note and vitals reviewed.      Assessment/Plan   Problem List Items Addressed This Visit        Cardiovascular and Mediastinum    Essential hypertension    Relevant Medications    NIFEdipine CC (ADALAT CC) 60 MG 24 hr tablet      Other Visit Diagnoses     Breast pain, left    -  Primary    Relevant Orders    Mammo diagnostic digital tomosynthesis bilateral w CAD    Irritated nevus of back        Relevant Orders    Ambulatory Referral to Dermatology        Carolyn was seen today for breast pain and abscess.    Diagnoses and all orders for this visit:    Breast pain, left  -     Mammo diagnostic digital tomosynthesis bilateral w CAD; Future    Irritated nevus of back  -     Ambulatory Referral to " Dermatology    Essential hypertension  -     NIFEdipine CC (ADALAT CC) 60 MG 24 hr tablet; Take 1 tablet by mouth Daily. For blood pressure    -     Stable, continue current treatment plan           The patient was counseled regarding diagnostic results, impressions, prognosis, instructions for management, risk factor reductions, education, and importance of treatment compliance.  The patient verbalized understanding of and agreement with the plan of care.    Advised patient to call with any further questions and any new or worsening symptoms.     Return if symptoms worsen or fail to improve, for Medicare Wellness with next visit.      Alaina Olguin, JEAN-CLAUDE    Please note that portions of this note were completed with a voice recognition program. Efforts were made to edit the dictations, but occasionally words are mistranscribed.

## 2020-08-14 ENCOUNTER — OFFICE VISIT (OUTPATIENT)
Dept: INTERNAL MEDICINE | Facility: CLINIC | Age: 70
End: 2020-08-14

## 2020-08-14 VITALS
HEART RATE: 79 BPM | OXYGEN SATURATION: 98 % | TEMPERATURE: 97.3 F | BODY MASS INDEX: 29.64 KG/M2 | HEIGHT: 61 IN | SYSTOLIC BLOOD PRESSURE: 120 MMHG | DIASTOLIC BLOOD PRESSURE: 82 MMHG | RESPIRATION RATE: 15 BRPM | WEIGHT: 157 LBS

## 2020-08-14 DIAGNOSIS — R43.0 LOSS OF SMELL: ICD-10-CM

## 2020-08-14 DIAGNOSIS — Z00.00 MEDICARE ANNUAL WELLNESS VISIT, SUBSEQUENT: Primary | ICD-10-CM

## 2020-08-14 DIAGNOSIS — K57.92 DIVERTICULITIS: ICD-10-CM

## 2020-08-14 LAB
BILIRUB BLD-MCNC: ABNORMAL MG/DL
CLARITY, POC: CLEAR
COLOR UR: YELLOW
EXPIRATION DATE: ABNORMAL
GLUCOSE UR STRIP-MCNC: NEGATIVE MG/DL
KETONES UR QL: ABNORMAL
LEUKOCYTE EST, POC: ABNORMAL
Lab: ABNORMAL
NITRITE UR-MCNC: NEGATIVE MG/ML
PH UR: 7 [PH] (ref 5–8)
PROT UR STRIP-MCNC: NEGATIVE MG/DL
RBC # UR STRIP: NEGATIVE /UL
SP GR UR: 1.01 (ref 1–1.03)
UROBILINOGEN UR QL: NORMAL

## 2020-08-14 PROCEDURE — 81003 URINALYSIS AUTO W/O SCOPE: CPT | Performed by: NURSE PRACTITIONER

## 2020-08-14 PROCEDURE — G0439 PPPS, SUBSEQ VISIT: HCPCS | Performed by: NURSE PRACTITIONER

## 2020-08-14 PROCEDURE — 99213 OFFICE O/P EST LOW 20 MIN: CPT | Performed by: NURSE PRACTITIONER

## 2020-08-14 RX ORDER — METRONIDAZOLE 500 MG/1
500 TABLET ORAL 2 TIMES DAILY
Qty: 14 TABLET | Refills: 0 | Status: SHIPPED | OUTPATIENT
Start: 2020-08-14 | End: 2021-02-25

## 2020-08-14 RX ORDER — CIPROFLOXACIN 500 MG/1
500 TABLET, FILM COATED ORAL 2 TIMES DAILY
Qty: 14 TABLET | Refills: 0 | Status: SHIPPED | OUTPATIENT
Start: 2020-08-14 | End: 2021-02-25

## 2020-08-14 RX ORDER — ONDANSETRON 4 MG/1
4 TABLET, ORALLY DISINTEGRATING ORAL EVERY 8 HOURS PRN
Qty: 30 TABLET | Refills: 0 | Status: SHIPPED | OUTPATIENT
Start: 2020-08-14 | End: 2021-02-25

## 2020-08-14 NOTE — PROGRESS NOTES
The ABCs of the Annual Wellness Visit  Subsequent Medicare Wellness Visit    Chief Complaint   Patient presents with   • Medicare Wellness-subsequent       Subjective   History of Present Illness:  Carolyn Snowden is a 70 y.o. female who presents for a Subsequent Medicare Wellness Visit and abd pain.  Patient reports waking up this morning with sudden onset nausea, vomiting, left upper and lower quadrant pain, and diarrhea.  She is experiencing chills as well.  Had difficulty getting out of bed.  Does have a history of diverticulitis and has had to be hospitalized for this in the past.    Also reports loss of sense of smell for the past 3 years.  Believes she got an infusion of vitamin C once monthly?  This did not help either.  Never found out why she cannot smell.    HEALTH RISK ASSESSMENT    Recent Hospitalizations:  No hospitalization(s) within the last year.    Current Medical Providers:  Patient Care Team:  Alaina Olguin APRN as PCP - General (Nurse Practitioner)  Ana Luisa Gauthier MD as PCP - Family Medicine  Emeterio Green MD as PCP - Claims Attributed    Smoking Status:  Social History     Tobacco Use   Smoking Status Former Smoker   Smokeless Tobacco Never Used       Alcohol Consumption:  Social History     Substance and Sexual Activity   Alcohol Use Yes    Comment: occasional       Depression Screen:   PHQ-2/PHQ-9 Depression Screening 8/14/2020   Little interest or pleasure in doing things 1   Feeling down, depressed, or hopeless 1   Trouble falling or staying asleep, or sleeping too much 1   Feeling tired or having little energy 1   Poor appetite or overeating 0   Feeling bad about yourself - or that you are a failure or have let yourself or your family down 1   Trouble concentrating on things, such as reading the newspaper or watching television 1   Moving or speaking so slowly that other people could have noticed. Or the opposite - being so fidgety or restless that you have been moving  around a lot more than usual 0   Thoughts that you would be better off dead, or of hurting yourself in some way 0   Total Score 6   If you checked off any problems, how difficult have these problems made it for you to do your work, take care of things at home, or get along with other people? Not difficult at all       Fall Risk Screen:  HESHAM Fall Risk Assessment has not been completed.    Health Habits and Functional and Cognitive Screening:  Functional & Cognitive Status 8/14/2020   Do you have difficulty preparing food and eating? No   Do you have difficulty bathing yourself, getting dressed or grooming yourself? No   Do you have difficulty using the toilet? No   Do you have difficulty moving around from place to place? No   Do you have trouble with steps or getting out of a bed or a chair? No   Current Diet Well Balanced Diet   Dental Exam Up to date   Eye Exam Up to date   Exercise (times per week) 2 times per week   Current Exercise Activities Include Walking   Do you need help using the phone?  No   Are you deaf or do you have serious difficulty hearing?  No   Do you need help with transportation? No   Do you need help shopping? No   Do you need help preparing meals?  No   Do you need help with housework?  No   Do you need help with laundry? No   Do you need help taking your medications? No   Do you need help managing money? No   Do you ever drive or ride in a car without wearing a seat belt? No   Have you felt unusual stress, anger or loneliness in the last month? Yes   Who do you live with? Child   If you need help, do you have trouble finding someone available to you? No   Have you been bothered in the last four weeks by sexual problems? No   Do you have difficulty concentrating, remembering or making decisions? Yes         Does the patient have evidence of cognitive impairment? No    Asprin use counseling:Taking ASA appropriately as indicated    Age-appropriate Screening Schedule:  Refer to the list  below for future screening recommendations based on patient's age, sex and/or medical conditions. Orders for these recommended tests are listed in the plan section. The patient has been provided with a written plan.    Health Maintenance   Topic Date Due   • ZOSTER VACCINE (2 of 2) 04/23/2018   • LIPID PANEL  07/23/2020   • INFLUENZA VACCINE  08/01/2020   • TDAP/TD VACCINES (2 - Td) 07/01/2021   • COLONOSCOPY  01/01/2022   • MAMMOGRAM  02/05/2022          The following portions of the patient's history were reviewed and updated as appropriate: allergies, current medications, past family history, past medical history, past social history, past surgical history and problem list.    Outpatient Medications Prior to Visit   Medication Sig Dispense Refill   • aspirin 81 MG tablet Take  by mouth.     • clonazePAM (KlonoPIN) 1 MG tablet      • FERROUS SULFATE PO Take  by mouth.     • NIFEdipine CC (ADALAT CC) 60 MG 24 hr tablet Take 1 tablet by mouth Daily. For blood pressure 30 tablet 5   • Coenzyme Q10-Vitamin E 100-1 MG-UNT/5ML syrup Take  by mouth.       No facility-administered medications prior to visit.        Patient Active Problem List   Diagnosis   • Atopic rhinitis   • Diverticulitis of intestine   • Gastroesophageal reflux disease   • Essential hypertension   • REM sleep behavior disorder   • Pure hypercholesterolemia   • Arthritis   • REM sleep behavior disorder   • Esophageal reflux   • Essential hypertension   • Intrinsic asthma without status asthmaticus without complication       Advanced Care Planning:  ACP discussion was held with the patient during this visit. Patient does not have an advance directive, information provided.    Review of Systems   Constitutional: Positive for appetite change and chills. Negative for activity change, fatigue and unexpected weight change.   HENT: Negative for congestion, ear pain, rhinorrhea, sinus pressure, sore throat and trouble swallowing.    Eyes: Negative for pain  "and visual disturbance.   Respiratory: Negative for cough, chest tightness, shortness of breath and wheezing.    Cardiovascular: Negative for chest pain, palpitations and leg swelling.   Gastrointestinal: Positive for abdominal pain, diarrhea, nausea and vomiting. Negative for blood in stool and constipation.   Endocrine: Negative for cold intolerance, heat intolerance, polydipsia and polyphagia.   Genitourinary: Negative for dysuria, frequency, hematuria, menstrual problem, urgency and vaginal discharge.   Musculoskeletal: Negative for arthralgias, back pain, joint swelling and myalgias.   Skin: Negative for color change, pallor, rash and wound.   Allergic/Immunologic: Negative for environmental allergies, food allergies and immunocompromised state.   Neurological: Negative for dizziness, tremors, seizures, syncope, facial asymmetry, speech difficulty, weakness, numbness and headaches.   Hematological: Negative for adenopathy. Does not bruise/bleed easily.   Psychiatric/Behavioral: Negative for decreased concentration, dysphoric mood, sleep disturbance and suicidal ideas. The patient is not nervous/anxious.        Compared to one year ago, the patient feels her physical health is the same.  Compared to one year ago, the patient feels her mental health is the same.    Reviewed chart for potential of high risk medication in the elderly: yes  Reviewed chart for potential of harmful drug interactions in the elderly:yes    Objective         Vitals:    08/14/20 1117   BP: 120/82   Pulse: 79   Resp: 15   Temp: 97.3 °F (36.3 °C)   SpO2: 98%   Weight: 71.2 kg (157 lb)   Height: 154.9 cm (61\")   PainSc:   8   PainLoc: Abdomen       Body mass index is 29.66 kg/m².  Discussed the patient's BMI with her. The BMI is above average; BMI management plan is completed.    Physical Exam   Constitutional: She is oriented to person, place, and time. She appears well-developed and well-nourished. She appears ill. No distress.   HENT: "   Mouth/Throat: Mucous membranes are normal.   Eyes: Right eye exhibits no discharge. Left eye exhibits no discharge. No scleral icterus.   Cardiovascular: Normal rate and regular rhythm.   Pulmonary/Chest: Effort normal and breath sounds normal.   Abdominal: Soft. Bowel sounds are normal. She exhibits no distension and no mass. There is no hepatosplenomegaly. There is tenderness in the left lower quadrant. There is guarding. There is no rigidity, no rebound, no CVA tenderness, no tenderness at McBurney's point and negative Bowman's sign. No hernia.   Neurological: She is alert and oriented to person, place, and time. She has normal strength.   Skin: Skin is warm and dry. Capillary refill takes 2 to 3 seconds. She is not diaphoretic. No pallor.   Psychiatric: She has a normal mood and affect. Her behavior is normal. Cognition and memory are normal.   Nursing note and vitals reviewed.            Assessment/Plan   Medicare Risks and Personalized Health Plan  CMS Preventative Services Quick Reference  Advance Directive Discussion  Fall Risk    The above risks/problems have been discussed with the patient.  Pertinent information has been shared with the patient in the After Visit Summary.  Follow up plans and orders are seen below in the Assessment/Plan Section.    Diagnoses and all orders for this visit:    1. Medicare annual wellness visit, subsequent (Primary)  Medicare wellness completed today, will do labs at follow-up as patient is acutely ill  2. Diverticulitis  -     POC Urinalysis Dipstick, Automated  -     ciprofloxacin (CIPRO) 500 MG tablet; Take 1 tablet by mouth 2 (Two) Times a Day.  Dispense: 14 tablet; Refill: 0  -     metroNIDAZOLE (FLAGYL) 500 MG tablet; Take 1 tablet by mouth 2 (Two) Times a Day.  Dispense: 14 tablet; Refill: 0  -     ondansetron ODT (ZOFRAN-ODT) 4 MG disintegrating tablet; Place 1 tablet on the tongue Every 8 (Eight) Hours As Needed for Nausea or Vomiting.  Dispense: 30 tablet;  Refill: 0  Exam consistent with diverticulitis.  Recommend clear liquid diet the next 2 days then may add in bland foods as tolerated.  Information was attached after visit summary.  Stressed patient report to hospital with any worsening symptoms.  3. Loss of smell  -     Ambulatory Referral to ENT (Otolaryngology)      Follow Up:  Return in about 6 months (around 2/14/2021), or if symptoms worsen or fail to improve.     An After Visit Summary and PPPS were given to the patient.

## 2020-08-14 NOTE — PATIENT INSTRUCTIONS
[facility] Diet  A bland diet consists of foods that are often soft and do not have a lot of fat, fiber, or extra seasonings. Foods without fat, fiber, or seasoning are easier for the body to digest. They are also less likely to irritate your mouth, throat, stomach, and other parts of your digestive system. A bland diet is sometimes called a BRAT diet.  What is my plan?  Your health care provider or food and nutrition specialist (dietitian) may recommend specific changes to your diet to prevent symptoms or to treat your symptoms. These changes may include:  · Eating small meals often.  · Cooking food until it is soft enough to chew easily.  · Chewing your food well.  · Drinking fluids slowly.  · Not eating foods that are very spicy, sour, or fatty.  · Not eating citrus fruits, such as oranges and grapefruit.  What do I need to know about this diet?  · Eat a variety of foods from the bland diet food list.  · Do not follow a bland diet longer than needed.  · Ask your health care provider whether you should take vitamins or supplements.  What foods can I eat?  Grains    Hot cereals, such as cream of wheat. Rice. Bread, crackers, or tortillas made from refined white flour.  Vegetables  Canned or cooked vegetables. Mashed or boiled potatoes.  Fruits    Bananas. Applesauce. Other types of cooked or canned fruit with the skin and seeds removed, such as canned peaches or pears.  Meats and other proteins    Scrambled eggs. Creamy peanut butter or other nut butters. Lean, well-cooked meats, such as chicken or fish. Tofu. Soups or broths.  Dairy  Low-fat dairy products, such as milk, cottage cheese, or yogurt.  Beverages    Water. Herbal tea. Apple juice.  Fats and oils  Mild salad dressings. Canola or olive oil.  Sweets and desserts  Pudding. Custard. Fruit gelatin. Ice cream.  The items listed above may not be a complete list of recommended foods and beverages. Contact a dietitian for more options.  What foods are not  recommended?  Grains  Whole grain breads and cereals.  Vegetables  Raw vegetables.  Fruits  Raw fruits, especially citrus, berries, or dried fruits.  Dairy  Whole fat dairy foods.  Beverages  Caffeinated drinks. Alcohol.  Seasonings and condiments  Strongly flavored seasonings or condiments. Hot sauce. Salsa.  Other foods  Spicy foods. Fried foods. Sour foods, such as pickled or fermented foods. Foods with high sugar content. Foods high in fiber.  The items listed above may not be a complete list of foods and beverages to avoid. Contact a dietitian for more information.  Summary  · A bland diet consists of foods that are often soft and do not have a lot of fat, fiber, or extra seasonings.  · Foods without fat, fiber, or seasoning are easier for the body to digest.  · Check with your health care provider to see how long you should follow this diet plan. It is not meant to be followed for long periods.  This information is not intended to replace advice given to you by your health care provider. Make sure you discuss any questions you have with your health care provider.  Document Released: 04/10/2017 Document Revised: 01/16/2019 Document Reviewed: 01/16/2019  [employer] Patient Education © 2020 [employer]  Diverticulitis    Diverticulitis is when small pockets in your large intestine (colon) get infected or swollen. This causes stomach pain and watery poop (diarrhea).  These pouches are called diverticula. They form in people who have a condition called diverticulosis.  Follow these instructions at home:  Medicines  · Take over-the-counter and prescription medicines only as told by your doctor. These include:  ? Antibiotics.  ? Pain medicines.  ? Fiber pills.  ? Probiotics.  ? Stool softeners.  · Do not drive or use heavy machinery while taking prescription pain medicine.  · If you were prescribed an antibiotic, take it as told. Do not stop taking it even if you feel better.  General instructions    · Follow a diet  as told by your doctor.  · When you feel better, your doctor may tell you to change your diet. You may need to eat a lot of fiber. Fiber makes it easier to poop (have bowel movements). Healthy foods with fiber include:  ? Berries.  ? Beans.  ? Lentils.  ? Green vegetables.  · Exercise 3 or more times a week. Aim for 30 minutes each time. Exercise enough to sweat and make your heart beat faster.  · Keep all follow-up visits as told. This is important. You may need to have an exam of the large intestine. This is called a colonoscopy.  Contact a doctor if:  · Your pain does not get better.  · You have a hard time eating or drinking.  · You are not pooping like normal.  Get help right away if:  · Your pain gets worse.  · Your problems do not get better.  · Your problems get worse very fast.  · You have a fever.  · You throw up (vomit) more than one time.  · You have poop that is:  ? Bloody.  ? Black.  ? Tarry.  Summary  · Diverticulitis is when small pockets in your large intestine (colon) get infected or swollen.  · Take medicines only as told by your doctor.  · Follow a diet as told by your doctor.  This information is not intended to replace advice given to you by your health care provider. Make sure you discuss any questions you have with your health care provider.  Document Released: 06/05/2009 Document Revised: 11/30/2018 Document Reviewed: 01/04/2018  [employer] Patient Education © 2020 [employer]

## 2021-02-25 ENCOUNTER — LAB (OUTPATIENT)
Dept: LAB | Facility: HOSPITAL | Age: 71
End: 2021-02-25

## 2021-02-25 ENCOUNTER — OFFICE VISIT (OUTPATIENT)
Dept: INTERNAL MEDICINE | Facility: CLINIC | Age: 71
End: 2021-02-25

## 2021-02-25 VITALS
WEIGHT: 159 LBS | TEMPERATURE: 97.3 F | SYSTOLIC BLOOD PRESSURE: 124 MMHG | RESPIRATION RATE: 16 BRPM | HEIGHT: 61 IN | OXYGEN SATURATION: 96 % | BODY MASS INDEX: 30.02 KG/M2 | HEART RATE: 70 BPM | DIASTOLIC BLOOD PRESSURE: 80 MMHG

## 2021-02-25 DIAGNOSIS — I10 ESSENTIAL HYPERTENSION: ICD-10-CM

## 2021-02-25 DIAGNOSIS — I10 ESSENTIAL HYPERTENSION: Primary | ICD-10-CM

## 2021-02-25 DIAGNOSIS — M25.511 ACUTE PAIN OF RIGHT SHOULDER: ICD-10-CM

## 2021-02-25 DIAGNOSIS — R19.8 CHANGE IN BOWEL MOVEMENT: ICD-10-CM

## 2021-02-25 DIAGNOSIS — E78.2 MIXED HYPERLIPIDEMIA: ICD-10-CM

## 2021-02-25 LAB
ALBUMIN SERPL-MCNC: 4.3 G/DL (ref 3.5–5.2)
ALBUMIN/GLOB SERPL: 1.3 G/DL
ALP SERPL-CCNC: 48 U/L (ref 39–117)
ALT SERPL W P-5'-P-CCNC: 9 U/L (ref 1–33)
ANION GAP SERPL CALCULATED.3IONS-SCNC: 10.5 MMOL/L (ref 5–15)
AST SERPL-CCNC: 15 U/L (ref 1–32)
BASOPHILS # BLD AUTO: 0.05 10*3/MM3 (ref 0–0.2)
BASOPHILS NFR BLD AUTO: 0.8 % (ref 0–1.5)
BILIRUB SERPL-MCNC: 0.4 MG/DL (ref 0–1.2)
BUN SERPL-MCNC: 17 MG/DL (ref 8–23)
BUN/CREAT SERPL: 13.1 (ref 7–25)
CALCIUM SPEC-SCNC: 9.9 MG/DL (ref 8.6–10.5)
CHLORIDE SERPL-SCNC: 105 MMOL/L (ref 98–107)
CHOLEST SERPL-MCNC: 242 MG/DL (ref 0–200)
CO2 SERPL-SCNC: 26.5 MMOL/L (ref 22–29)
CREAT SERPL-MCNC: 1.3 MG/DL (ref 0.57–1)
DEPRECATED RDW RBC AUTO: 41.2 FL (ref 37–54)
EOSINOPHIL # BLD AUTO: 0.1 10*3/MM3 (ref 0–0.4)
EOSINOPHIL NFR BLD AUTO: 1.7 % (ref 0.3–6.2)
ERYTHROCYTE [DISTWIDTH] IN BLOOD BY AUTOMATED COUNT: 13.2 % (ref 12.3–15.4)
GFR SERPL CREATININE-BSD FRML MDRD: 49 ML/MIN/1.73
GLOBULIN UR ELPH-MCNC: 3.2 GM/DL
GLUCOSE SERPL-MCNC: 96 MG/DL (ref 65–99)
HCT VFR BLD AUTO: 40.6 % (ref 34–46.6)
HDLC SERPL-MCNC: 70 MG/DL (ref 40–60)
HGB BLD-MCNC: 13.3 G/DL (ref 12–15.9)
IMM GRANULOCYTES # BLD AUTO: 0.02 10*3/MM3 (ref 0–0.05)
IMM GRANULOCYTES NFR BLD AUTO: 0.3 % (ref 0–0.5)
LDLC SERPL CALC-MCNC: 156 MG/DL (ref 0–100)
LDLC/HDLC SERPL: 2.2 {RATIO}
LYMPHOCYTES # BLD AUTO: 2.18 10*3/MM3 (ref 0.7–3.1)
LYMPHOCYTES NFR BLD AUTO: 36 % (ref 19.6–45.3)
MCH RBC QN AUTO: 27.8 PG (ref 26.6–33)
MCHC RBC AUTO-ENTMCNC: 32.8 G/DL (ref 31.5–35.7)
MCV RBC AUTO: 84.8 FL (ref 79–97)
MONOCYTES # BLD AUTO: 0.55 10*3/MM3 (ref 0.1–0.9)
MONOCYTES NFR BLD AUTO: 9.1 % (ref 5–12)
NEUTROPHILS NFR BLD AUTO: 3.15 10*3/MM3 (ref 1.7–7)
NEUTROPHILS NFR BLD AUTO: 52.1 % (ref 42.7–76)
NRBC BLD AUTO-RTO: 0 /100 WBC (ref 0–0.2)
PLATELET # BLD AUTO: 277 10*3/MM3 (ref 140–450)
PMV BLD AUTO: 11.1 FL (ref 6–12)
POTASSIUM SERPL-SCNC: 3.8 MMOL/L (ref 3.5–5.2)
PROT SERPL-MCNC: 7.5 G/DL (ref 6–8.5)
RBC # BLD AUTO: 4.79 10*6/MM3 (ref 3.77–5.28)
SODIUM SERPL-SCNC: 142 MMOL/L (ref 136–145)
TRIGL SERPL-MCNC: 90 MG/DL (ref 0–150)
VLDLC SERPL-MCNC: 16 MG/DL (ref 5–40)
WBC # BLD AUTO: 6.05 10*3/MM3 (ref 3.4–10.8)

## 2021-02-25 PROCEDURE — 85025 COMPLETE CBC W/AUTO DIFF WBC: CPT

## 2021-02-25 PROCEDURE — 99214 OFFICE O/P EST MOD 30 MIN: CPT | Performed by: NURSE PRACTITIONER

## 2021-02-25 PROCEDURE — 80053 COMPREHEN METABOLIC PANEL: CPT | Performed by: NURSE PRACTITIONER

## 2021-02-25 PROCEDURE — 80061 LIPID PANEL: CPT | Performed by: NURSE PRACTITIONER

## 2021-02-25 RX ORDER — NIFEDIPINE 60 MG/1
60 TABLET, FILM COATED, EXTENDED RELEASE ORAL DAILY
Qty: 30 TABLET | Refills: 5 | Status: SHIPPED | OUTPATIENT
Start: 2021-02-25 | End: 2021-12-07

## 2021-02-25 NOTE — PATIENT INSTRUCTIONS
Muscle Pain, Adult  Muscle pain (myalgia) may be mild or severe. In most cases, the pain lasts only a short time and it goes away without treatment. It is normal to feel some muscle pain after starting a workout program. Muscles that have not been used often will be sore at first.  Muscle pain may also be caused by many other things, including:  · Overuse or muscle strain, especially if you are not in shape. This is the most common cause of muscle pain.  · Injury.  · Bruises.  · Viruses, such as the flu.  · Infectious diseases.  · A chronic condition that causes muscle tenderness, fatigue, and headache (fibromyalgia).  · A condition, such as lupus, in which the body’s disease-fighting system attacks other organs in the body (autoimmune or rheumatologic diseases).  · Certain drugs, including ACE inhibitors and statins.  To diagnose the cause of your muscle pain, your health care provider will do a physical exam and ask questions about the pain and when it began. If you have not had muscle pain for very long, your health care provider may want to wait before doing much testing. If your muscle pain has lasted a long time, your health care provider may want to run tests right away. In some cases, this may include tests to rule out certain conditions or illnesses.  Treatment for muscle pain depends on the cause. Home care is often enough to relieve muscle pain. Your health care provider may also prescribe anti-inflammatory medicine.  Follow these instructions at home:  Activity  · If overuse is causing your muscle pain:  ? Slow down your activities until the pain goes away.  ? Do regular, gentle exercises if you are not usually active.  ? Warm up before exercising. Stretch before and after exercising. This can help lower the risk of muscle pain.  · Do not continue working out if the pain is very bad. Bad pain could mean that you have injured a muscle.  Managing pain and discomfort    · If directed, apply ice to the sore  muscle:  ? Put ice in a plastic bag.  ? Place a towel between your skin and the bag.  ? Leave the ice on for 20 minutes, 2-3 times a day.  · You may also alternate between applying ice and applying heat as told by your health care provider. To apply heat, use the heat source that your health care provider recommends, such as a moist heat pack or a heating pad.  ? Place a towel between your skin and the heat source.  ? Leave the heat on for 20-30 minutes.  ? Remove the heat if your skin turns bright red. This is especially important if you are unable to feel pain, heat, or cold. You may have a greater risk of getting burned.  Medicines  · Take over-the-counter and prescription medicines only as told by your health care provider.  · Do not drive or use heavy machinery while taking prescription pain medicine.  Contact a health care provider if:  · Your muscle pain gets worse and medicines do not help.  · You have muscle pain that lasts longer than 3 days.  · You have a rash or fever along with muscle pain.  · You have muscle pain after a tick bite.  · You have muscle pain while working out, even though you are in good physical condition.  · You have redness, soreness, or swelling along with muscle pain.  · You have muscle pain after starting a new medicine or changing the dose of a medicine.  Get help right away if:  · You have trouble breathing.  · You have trouble swallowing.  · You have muscle pain along with a stiff neck, fever, and vomiting.  · You have severe muscle weakness or cannot move part of your body.  This information is not intended to replace advice given to you by your health care provider. Make sure you discuss any questions you have with your health care provider.  Document Revised: 11/30/2018 Document Reviewed: 05/09/2017  Elsevier Patient Education © 2020 [employer]
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
## 2021-02-26 LAB — ALBUMIN UR-MCNC: 5.2 MG/DL

## 2021-02-26 PROCEDURE — 82043 UR ALBUMIN QUANTITATIVE: CPT | Performed by: NURSE PRACTITIONER

## 2021-03-31 RX ORDER — SOD SULF/POT CHLORIDE/MAG SULF 1.479 G
12 TABLET ORAL TAKE AS DIRECTED
Qty: 24 TABLET | Refills: 0 | Status: SHIPPED | OUTPATIENT
Start: 2021-03-31 | End: 2021-08-31

## 2021-04-04 ENCOUNTER — APPOINTMENT (OUTPATIENT)
Dept: PREADMISSION TESTING | Facility: HOSPITAL | Age: 71
End: 2021-04-04

## 2021-04-04 PROCEDURE — U0004 COV-19 TEST NON-CDC HGH THRU: HCPCS

## 2021-04-04 PROCEDURE — C9803 HOPD COVID-19 SPEC COLLECT: HCPCS

## 2021-04-05 LAB — SARS-COV-2 RNA NOSE QL NAA+PROBE: NOT DETECTED

## 2021-04-06 ENCOUNTER — OUTSIDE FACILITY SERVICE (OUTPATIENT)
Dept: GASTROENTEROLOGY | Facility: CLINIC | Age: 71
End: 2021-04-06

## 2021-04-06 PROCEDURE — 88305 TISSUE EXAM BY PATHOLOGIST: CPT | Performed by: INTERNAL MEDICINE

## 2021-04-06 PROCEDURE — 45385 COLONOSCOPY W/LESION REMOVAL: CPT | Performed by: INTERNAL MEDICINE

## 2021-04-07 ENCOUNTER — LAB REQUISITION (OUTPATIENT)
Dept: LAB | Facility: HOSPITAL | Age: 71
End: 2021-04-07

## 2021-04-07 DIAGNOSIS — Z12.11 ENCOUNTER FOR SCREENING FOR MALIGNANT NEOPLASM OF COLON: ICD-10-CM

## 2021-04-08 LAB
CYTO UR: NORMAL
LAB AP CASE REPORT: NORMAL
LAB AP CLINICAL INFORMATION: NORMAL
PATH REPORT.FINAL DX SPEC: NORMAL
PATH REPORT.GROSS SPEC: NORMAL

## 2021-06-01 ENCOUNTER — TELEPHONE (OUTPATIENT)
Dept: INTERNAL MEDICINE | Facility: CLINIC | Age: 71
End: 2021-06-01

## 2021-06-01 NOTE — TELEPHONE ENCOUNTER
Caller: Carolyn Snowden    Relationship: Self    Best call back number: [phone]    Who is your current provider: JEAN-CLAUDE SILVA    Who would you like your new provider to be: JEAN-CLAUDE MENDEZ    What are your reasons for transferring care: PATIENT WAS A PREVIOUS PATIENT OF DR. BARRERA AND DID NOT REALIZE THAT ESTELLA COX HAS BEEN ASSIGNED  AS HER PCP BUT WOULD LIKE TO SEE SNEHA VALENCIA BECAUSE THAT IS WHO HER DAUGHTER SEES.     Additional notes: PLEASE CALL IF THIS CAN BE DONE AND WILL THEN NEED TO SCHEDULE A PHYSICAL WITH HER.

## 2021-08-31 ENCOUNTER — OFFICE VISIT (OUTPATIENT)
Dept: INTERNAL MEDICINE | Facility: CLINIC | Age: 71
End: 2021-08-31

## 2021-08-31 ENCOUNTER — TELEPHONE (OUTPATIENT)
Dept: INTERNAL MEDICINE | Facility: CLINIC | Age: 71
End: 2021-08-31

## 2021-08-31 VITALS
DIASTOLIC BLOOD PRESSURE: 68 MMHG | TEMPERATURE: 97.2 F | HEART RATE: 72 BPM | WEIGHT: 155.6 LBS | BODY MASS INDEX: 29.38 KG/M2 | SYSTOLIC BLOOD PRESSURE: 122 MMHG | HEIGHT: 61 IN | OXYGEN SATURATION: 99 %

## 2021-08-31 DIAGNOSIS — E78.2 MIXED HYPERLIPIDEMIA: ICD-10-CM

## 2021-08-31 DIAGNOSIS — R41.3 MEMORY LOSS, SHORT TERM: ICD-10-CM

## 2021-08-31 DIAGNOSIS — I10 ESSENTIAL HYPERTENSION: Primary | ICD-10-CM

## 2021-08-31 PROCEDURE — 99214 OFFICE O/P EST MOD 30 MIN: CPT | Performed by: NURSE PRACTITIONER

## 2021-08-31 RX ORDER — MEMANTINE HYDROCHLORIDE 5 MG/1
5 TABLET ORAL DAILY
Qty: 30 TABLET | Refills: 3 | Status: SHIPPED | OUTPATIENT
Start: 2021-08-31 | End: 2021-11-10

## 2021-08-31 NOTE — PROGRESS NOTES
"Chief Complaint   Patient presents with   • Memory Loss     for the past year        HPI  Carolyn Snowden is a 71 y.o. female presents for follow-up on mixed hyperlipidemia and HTN.  Pt also complains of memory loss for the past year.  Pt states that her daughter tells her \"it's getting really bad\".  Pt states she didn't even remember making this appointment.  She states that she can go to bed with something on her mind and the next day she doesn't remember.  Pt states that she's not remembering things that she has said to her family.  She feels that this is getting worse.  She denies any history of alzheimers or dementia.  She denies any irritability with any of her family.  She and her family are getting very concerned regarding the degree of memory loss.  She has even been forgetting what she eats during the day and sometimes forgets to eat during the day.      The following portions of the patient's history were reviewed and updated as appropriate: allergies, current medications, past family history, past medical history, past social history, past surgical history and problem list.    Subjective  Review of Systems   Constitutional: Negative for activity change, appetite change and fatigue.   HENT: Negative for congestion.    Respiratory: Negative for cough and shortness of breath.    Cardiovascular: Negative for chest pain and leg swelling.   Gastrointestinal: Negative for abdominal pain.   Neurological: Positive for memory problem. Negative for dizziness, speech difficulty, weakness, headache and confusion.   Psychiatric/Behavioral: Negative for behavioral problems and decreased concentration.       Objective  Visit Vitals  /68   Pulse 72   Temp 97.2 °F (36.2 °C) (Temporal)   Ht 154.9 cm (61\")   Wt 70.6 kg (155 lb 9.6 oz)   SpO2 99%   BMI 29.40 kg/m²        Physical Exam  Vitals and nursing note reviewed.   HENT:      Head: Normocephalic.   Eyes:      Pupils: Pupils are equal, round, and reactive to " light.   Cardiovascular:      Rate and Rhythm: Normal rate and regular rhythm.      Pulses: Normal pulses.      Heart sounds: Normal heart sounds.   Pulmonary:      Effort: Pulmonary effort is normal.      Breath sounds: Normal breath sounds.   Skin:     General: Skin is warm and dry.      Capillary Refill: Capillary refill takes less than 2 seconds.   Neurological:      General: No focal deficit present.      Mental Status: She is alert and oriented to person, place, and time.      GCS: GCS eye subscore is 4. GCS verbal subscore is 5. GCS motor subscore is 6.      Cranial Nerves: Cranial nerves are intact.      Coordination: Coordination is intact.      Gait: Gait is intact.   Psychiatric:         Attention and Perception: Attention normal.         Mood and Affect: Mood normal.         Behavior: Behavior normal.           Procedures     Assessment and Plan  Diagnoses and all orders for this visit:    1. Essential hypertension (Primary)    2. Mixed hyperlipidemia    3. Memory loss, short term  -     memantine (Namenda) 5 MG tablet; Take 1 tablet by mouth Daily.  Dispense: 30 tablet; Refill: 3  -     Ambulatory Referral to Neurology  -     MRI Brain With & Without Contrast; Future    HTN well controlled with Nifedipine - will cont  memory loss is likely age related but will schedule MRI to r/o abnormality  Start Namenda  Refer to neurology      Return in about 4 weeks (around 9/28/2021) for Recheck Memory Loss.         JEAN-CLAUDE Richmond

## 2021-09-01 ENCOUNTER — TELEPHONE (OUTPATIENT)
Dept: INTERNAL MEDICINE | Facility: CLINIC | Age: 71
End: 2021-09-01

## 2021-09-01 NOTE — TELEPHONE ENCOUNTER
Pt would like to know If she should stop taking the Klonopin 1 MG with the namenda 5 mg, please advise.

## 2021-09-15 ENCOUNTER — TELEPHONE (OUTPATIENT)
Dept: INTERNAL MEDICINE | Facility: CLINIC | Age: 71
End: 2021-09-15

## 2021-09-15 NOTE — TELEPHONE ENCOUNTER
CALLER:    BRYON    TELEPHONE NUMBER:    [phone]    [facility] NEUROLOGY    CALLER STATED THAT THE OFFICE HAS TRIED TO CONTACT PATIENT WITH (3) DIFFERENT ATTEMPTS WITH NO CONNECTION    CALLER STATED THE REFERRAL WILL BE CLOSED     CALLER ALSO STATED THAT IF THE PATIENT REQUESTS CONTACT, THE OFFICE WILL REOPEN THE REFERRAL     PLEASE CONTACT NEUROLOGY OFFICE WITH ANY QUESTIONS    ESTELLA COX

## 2021-09-21 ENCOUNTER — APPOINTMENT (OUTPATIENT)
Dept: MRI IMAGING | Facility: HOSPITAL | Age: 71
End: 2021-09-21

## 2021-09-28 ENCOUNTER — TELEPHONE (OUTPATIENT)
Dept: INTERNAL MEDICINE | Facility: CLINIC | Age: 71
End: 2021-09-28

## 2021-09-28 NOTE — TELEPHONE ENCOUNTER
Pts daughter called wanting to know if they should keep the appt for tomorrow with jolene or should they r/s because the pt missed her MRI on the 21st.  Pt has a new appt for a MRI is  10-, please advise.

## 2021-09-28 NOTE — TELEPHONE ENCOUNTER
Caller: Magalys Snowden    Relationship: Emergency Contact    Best call back number: [phone]    What is the best time to reach you: ANYTIME     Who are you requesting to speak with (clinical staff, provider,  specific staff member): NURSE    What was the call regarding: REGARDING APPOINTMENT TOMORROW.  PATIENT'S DAUGHTER STATED THAT SHE IS UNSURE THAT THE PATIENT IS BEING EVALUATED FOR ALZHEIMER'S AND THE DAUGHTER IS NOT SURE THE PATIENT ARTICULATED CORRECTLY ABOUT TOMORROWS APPOINTMENT     Do you require a callback: YES

## 2021-09-29 ENCOUNTER — OFFICE VISIT (OUTPATIENT)
Dept: INTERNAL MEDICINE | Facility: CLINIC | Age: 71
End: 2021-09-29

## 2021-09-29 ENCOUNTER — LAB (OUTPATIENT)
Dept: LAB | Facility: HOSPITAL | Age: 71
End: 2021-09-29

## 2021-09-29 VITALS
HEIGHT: 61 IN | WEIGHT: 155.4 LBS | OXYGEN SATURATION: 99 % | SYSTOLIC BLOOD PRESSURE: 126 MMHG | TEMPERATURE: 97.2 F | BODY MASS INDEX: 29.34 KG/M2 | DIASTOLIC BLOOD PRESSURE: 82 MMHG | HEART RATE: 76 BPM

## 2021-09-29 DIAGNOSIS — D50.8 OTHER IRON DEFICIENCY ANEMIA: ICD-10-CM

## 2021-09-29 DIAGNOSIS — E78.2 MIXED HYPERLIPIDEMIA: ICD-10-CM

## 2021-09-29 DIAGNOSIS — R41.3 MEMORY LOSS, SHORT TERM: Primary | ICD-10-CM

## 2021-09-29 DIAGNOSIS — Z83.3 FAMILY HISTORY OF DIABETES MELLITUS: ICD-10-CM

## 2021-09-29 LAB
ALBUMIN SERPL-MCNC: 4.5 G/DL (ref 3.5–5.2)
ALBUMIN/GLOB SERPL: 1.5 G/DL
ALP SERPL-CCNC: 51 U/L (ref 39–117)
ALT SERPL W P-5'-P-CCNC: 14 U/L (ref 1–33)
ANION GAP SERPL CALCULATED.3IONS-SCNC: 11.3 MMOL/L (ref 5–15)
AST SERPL-CCNC: 19 U/L (ref 1–32)
BASOPHILS # BLD AUTO: 0.06 10*3/MM3 (ref 0–0.2)
BASOPHILS NFR BLD AUTO: 1.2 % (ref 0–1.5)
BILIRUB SERPL-MCNC: 0.4 MG/DL (ref 0–1.2)
BUN SERPL-MCNC: 11 MG/DL (ref 8–23)
BUN/CREAT SERPL: 10.6 (ref 7–25)
CALCIUM SPEC-SCNC: 9.4 MG/DL (ref 8.6–10.5)
CHLORIDE SERPL-SCNC: 106 MMOL/L (ref 98–107)
CHOLEST SERPL-MCNC: 248 MG/DL (ref 0–200)
CO2 SERPL-SCNC: 24.7 MMOL/L (ref 22–29)
CREAT SERPL-MCNC: 1.04 MG/DL (ref 0.57–1)
DEPRECATED RDW RBC AUTO: 40.3 FL (ref 37–54)
EOSINOPHIL # BLD AUTO: 0.11 10*3/MM3 (ref 0–0.4)
EOSINOPHIL NFR BLD AUTO: 2.2 % (ref 0.3–6.2)
ERYTHROCYTE [DISTWIDTH] IN BLOOD BY AUTOMATED COUNT: 13.1 % (ref 12.3–15.4)
FERRITIN SERPL-MCNC: 730 NG/ML (ref 13–150)
GFR SERPL CREATININE-BSD FRML MDRD: 63 ML/MIN/1.73
GLOBULIN UR ELPH-MCNC: 3.1 GM/DL
GLUCOSE SERPL-MCNC: 87 MG/DL (ref 65–99)
HBA1C MFR BLD: 5.5 % (ref 4.8–5.6)
HCT VFR BLD AUTO: 39.3 % (ref 34–46.6)
HDLC SERPL-MCNC: 78 MG/DL (ref 40–60)
HGB BLD-MCNC: 12.9 G/DL (ref 12–15.9)
IMM GRANULOCYTES # BLD AUTO: 0.01 10*3/MM3 (ref 0–0.05)
IMM GRANULOCYTES NFR BLD AUTO: 0.2 % (ref 0–0.5)
IRON 24H UR-MRATE: 85 MCG/DL (ref 37–145)
IRON SATN MFR SERPL: 25 % (ref 20–50)
LDLC SERPL CALC-MCNC: 158 MG/DL (ref 0–100)
LDLC/HDLC SERPL: 1.99 {RATIO}
LYMPHOCYTES # BLD AUTO: 2.6 10*3/MM3 (ref 0.7–3.1)
LYMPHOCYTES NFR BLD AUTO: 51.3 % (ref 19.6–45.3)
MCH RBC QN AUTO: 27.7 PG (ref 26.6–33)
MCHC RBC AUTO-ENTMCNC: 32.8 G/DL (ref 31.5–35.7)
MCV RBC AUTO: 84.3 FL (ref 79–97)
MONOCYTES # BLD AUTO: 0.44 10*3/MM3 (ref 0.1–0.9)
MONOCYTES NFR BLD AUTO: 8.7 % (ref 5–12)
NEUTROPHILS NFR BLD AUTO: 1.85 10*3/MM3 (ref 1.7–7)
NEUTROPHILS NFR BLD AUTO: 36.4 % (ref 42.7–76)
NRBC BLD AUTO-RTO: 0 /100 WBC (ref 0–0.2)
PLATELET # BLD AUTO: 262 10*3/MM3 (ref 140–450)
PMV BLD AUTO: 11.8 FL (ref 6–12)
POTASSIUM SERPL-SCNC: 4.1 MMOL/L (ref 3.5–5.2)
PROT SERPL-MCNC: 7.6 G/DL (ref 6–8.5)
RBC # BLD AUTO: 4.66 10*6/MM3 (ref 3.77–5.28)
SODIUM SERPL-SCNC: 142 MMOL/L (ref 136–145)
TIBC SERPL-MCNC: 337 MCG/DL (ref 298–536)
TRANSFERRIN SERPL-MCNC: 226 MG/DL (ref 200–360)
TRIGL SERPL-MCNC: 74 MG/DL (ref 0–150)
VLDLC SERPL-MCNC: 12 MG/DL (ref 5–40)
WBC # BLD AUTO: 5.07 10*3/MM3 (ref 3.4–10.8)

## 2021-09-29 PROCEDURE — 36415 COLL VENOUS BLD VENIPUNCTURE: CPT | Performed by: NURSE PRACTITIONER

## 2021-09-29 PROCEDURE — 99214 OFFICE O/P EST MOD 30 MIN: CPT | Performed by: NURSE PRACTITIONER

## 2021-09-29 PROCEDURE — 83036 HEMOGLOBIN GLYCOSYLATED A1C: CPT

## 2021-09-29 PROCEDURE — 82728 ASSAY OF FERRITIN: CPT

## 2021-09-29 PROCEDURE — 84466 ASSAY OF TRANSFERRIN: CPT | Performed by: NURSE PRACTITIONER

## 2021-09-29 PROCEDURE — 80061 LIPID PANEL: CPT | Performed by: NURSE PRACTITIONER

## 2021-09-29 PROCEDURE — 83540 ASSAY OF IRON: CPT | Performed by: NURSE PRACTITIONER

## 2021-09-29 PROCEDURE — 80053 COMPREHEN METABOLIC PANEL: CPT | Performed by: NURSE PRACTITIONER

## 2021-09-29 PROCEDURE — 85025 COMPLETE CBC W/AUTO DIFF WBC: CPT

## 2021-09-29 NOTE — PROGRESS NOTES
"Chief Complaint   Patient presents with   • Memory Loss     follow up, no improvment        HPI  Carolyn Snowden is a 71 y.o. female presents for follow-up on memory loss.  She was started on Namenda her last visit.  She took the medication for about 4 weeks and then stopped it because she wasn't sure if she should continue it or not.  She has a scheduled MRI in October of her brain.   She has already been referred to neurology and she follows up with them in November.  Pt states that she can tell some difference with the med but her daughter cannot tell.  The memory loss is mostly short term.  Her daughter is with her.  She states the memory loss is getting more frequent.  States she is ready to take her mom's keys from her.      The following portions of the patient's history were reviewed and updated as appropriate: allergies, current medications, past family history, past medical history, past social history, past surgical history and problem list.    Subjective  Review of Systems   Constitutional: Negative for activity change, appetite change and fatigue.   HENT: Negative for congestion.    Respiratory: Negative for cough and shortness of breath.    Cardiovascular: Negative for chest pain and leg swelling.   Gastrointestinal: Negative for abdominal pain.   Neurological: Positive for memory problem. Negative for dizziness, weakness and confusion.   Psychiatric/Behavioral: Negative for behavioral problems and decreased concentration.       Objective  Visit Vitals  /82   Pulse 76   Temp 97.2 °F (36.2 °C) (Temporal)   Ht 154.9 cm (61\")   Wt 70.5 kg (155 lb 6.4 oz)   SpO2 99%   BMI 29.36 kg/m²        Physical Exam  Vitals and nursing note reviewed.   HENT:      Head: Normocephalic.   Eyes:      Pupils: Pupils are equal, round, and reactive to light.   Cardiovascular:      Rate and Rhythm: Normal rate and regular rhythm.      Pulses: Normal pulses.      Heart sounds: Normal heart sounds.   Pulmonary:      " Effort: Pulmonary effort is normal.      Breath sounds: Normal breath sounds.   Skin:     General: Skin is warm and dry.      Capillary Refill: Capillary refill takes less than 2 seconds.   Neurological:      General: No focal deficit present.      Mental Status: She is alert and oriented to person, place, and time. Mental status is at baseline.      Coordination: Coordination is intact.      Gait: Gait is intact.   Psychiatric:         Attention and Perception: Attention normal.         Mood and Affect: Mood normal.         Behavior: Behavior normal.          Procedures     Assessment and Plan  Diagnoses and all orders for this visit:    1. Memory loss, short term (Primary)    2. Mixed hyperlipidemia  -     Comprehensive Metabolic Panel  -     CBC Auto Differential; Future  -     Lipid Panel    3. Other iron deficiency anemia  -     Iron Profile  -     Ferritin; Future    4. Family history of diabetes mellitus  -     Hemoglobin A1c; Future    cont Namenda  Keep appt for MRI of head  Labs today    Return in about 2 months (around 11/29/2021) for Recheck Memory Loss.        JEAN-CLAUDE Richmond

## 2021-09-30 DIAGNOSIS — E78.2 MIXED HYPERLIPIDEMIA: Primary | ICD-10-CM

## 2021-09-30 RX ORDER — ATORVASTATIN CALCIUM 10 MG/1
10 TABLET, FILM COATED ORAL NIGHTLY
Qty: 30 TABLET | Refills: 5 | Status: SHIPPED | OUTPATIENT
Start: 2021-09-30 | End: 2022-02-07 | Stop reason: SDUPTHER

## 2021-10-11 ENCOUNTER — APPOINTMENT (OUTPATIENT)
Dept: MRI IMAGING | Facility: HOSPITAL | Age: 71
End: 2021-10-11

## 2021-10-12 ENCOUNTER — TELEPHONE (OUTPATIENT)
Dept: NEUROLOGY | Facility: OTHER | Age: 71
End: 2021-10-12

## 2021-10-12 ENCOUNTER — TELEPHONE (OUTPATIENT)
Dept: INTERNAL MEDICINE | Facility: CLINIC | Age: 71
End: 2021-10-12
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
## 2021-10-26 ENCOUNTER — TELEPHONE (OUTPATIENT)
Dept: INTERNAL MEDICINE | Facility: CLINIC | Age: 71
End: 2021-10-26

## 2021-10-26 NOTE — TELEPHONE ENCOUNTER
Caller: Magalys Snowden    Relationship: Emergency Contact    Best call back number: [phone]    What is the best time to reach you:ANYTIME     Who are you requesting to speak with (clinical staff, provider,  specific staff member): NURSE    What was the call regarding: FMLA FOR DEMENTIA AND ALZHEIMER'S     Do you require a callback: YES

## 2021-11-04 ENCOUNTER — HOSPITAL ENCOUNTER (OUTPATIENT)
Dept: MRI IMAGING | Facility: HOSPITAL | Age: 71
Discharge: HOME | End: 2021-11-04
Admitting: NURSE PRACTITIONER

## 2021-11-04 DIAGNOSIS — R41.3 MEMORY LOSS, SHORT TERM: ICD-10-CM

## 2021-11-04 PROCEDURE — 70553 MRI BRAIN STEM W/O & W/DYE: CPT

## 2021-11-04 PROCEDURE — A9577 INJ MULTIHANCE: HCPCS | Performed by: NURSE PRACTITIONER

## 2021-11-04 PROCEDURE — 0 GADOBENATE DIMEGLUMINE 529 MG/ML SOLUTION: Performed by: NURSE PRACTITIONER

## 2021-11-04 RX ADMIN — GADOBENATE DIMEGLUMINE 14 ML: 529 INJECTION, SOLUTION INTRAVENOUS at 14:25

## 2021-11-10 ENCOUNTER — OFFICE VISIT (OUTPATIENT)
Dept: NEUROLOGY | Facility: CLINIC | Age: 71
End: 2021-11-10

## 2021-11-10 VITALS
BODY MASS INDEX: 30.43 KG/M2 | OXYGEN SATURATION: 98 % | HEIGHT: 60 IN | SYSTOLIC BLOOD PRESSURE: 124 MMHG | DIASTOLIC BLOOD PRESSURE: 76 MMHG | WEIGHT: 155 LBS | HEART RATE: 86 BPM

## 2021-11-10 DIAGNOSIS — R41.3 MEMORY LOSS: Primary | ICD-10-CM

## 2021-11-10 DIAGNOSIS — G31.84 MILD COGNITIVE IMPAIRMENT WITH MEMORY LOSS: ICD-10-CM

## 2021-11-10 DIAGNOSIS — R41.3 MEMORY LOSS, SHORT TERM: ICD-10-CM

## 2021-11-10 PROCEDURE — 99215 OFFICE O/P EST HI 40 MIN: CPT | Performed by: NURSE PRACTITIONER

## 2021-11-10 RX ORDER — MEMANTINE HYDROCHLORIDE 10 MG/1
TABLET ORAL
Qty: 180 TABLET | Refills: 1 | Status: SHIPPED | OUTPATIENT
Start: 2021-11-10 | End: 2022-03-10 | Stop reason: SDUPTHER

## 2021-11-10 NOTE — PROGRESS NOTES
Subjective:     Patient ID: Carolyn Snowden is a 71 y.o. female.    CC:   Chief Complaint   Patient presents with   • Memory Loss     short term       HPI:   History of Present Illness     Carolyn Snowden is a 71 y.o. female who comes to clinic today for evaluation of memory loss . She has noted symptoms since at least 2020 marked initially by forgetfulness . This has gradually worsened  over time. Additional symptoms have included impairments in short term memory . There have been associated  symptoms of depression . She denies  impairments in ADL's. Her family  manages her finances and she managers her medications with a pill planner and sometimes forgets but not often. She continues to drive.  . She is currently residing at home with her daughter. Her daughter is now having to care for her and requires LA to get Mrs. Snowden to and from appointments. She is very concerned about her safety with driving.     Maternal grandmother had Alzheimer's around age 93 prior to passing away. Her  passed away in 2012 in his sleep. REM sleep disorder 15+ years. Has been on clonazepam 1.5mg at night to treat this-sleep specialist Dr. Emeterio Green. 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 Unknown cause. She can taste. No tremors. No gait issues. No shuffling. No family history of parkinson's disease. 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 Completed 1.5 years of college. Right hand dominant. Challenges with learning new things.    She was referred here by her primary care provider.  Her daughter gives most of the history today.  She did have an MRI of the brain with and without contrast completed on 11/4/2021 and imaging as well as radiology report reviewed today in clinic and she does have some mild to moderate chronic small vessel ischemic changes with no acute intracranial abnormalities, mild atrophy of the brain consistent with age.  Compared to MRI of the brain with and without  contrast from March 5, 2018 there are no significant changes.  She has had hemoglobin A1c 5.5% with PCP, ferritin 730, CBC overall normal, iron profile normal, lipid panel with total cholesterol 248, HDL 78 and  and now on low-dose Lipitor, CMP with creatinine 1.04 and otherwise normal.  She did used to get vitamin B12 injections with the prior provider and felt that these did not help.  Has not had recent thyroid or B12 levels checked.  PCP in August started her on memantine 5 mg daily.  They have not seen a change.  She is taking Prevagen over-the-counter.  Has not tried donepezil.  She had a short episode of 3 of her toes tingling yesterday and now, she tells me this completely resolved on its own, it was the first time it ever occurred, she denies back pain, numbness or tingling in extremities or urinary or bowel incontinence or any other issues.  This has completely resolved.    The following portions of the patient's history were reviewed and updated as appropriate: allergies, current medications, past family history, past medical history, past social history, past surgical history and problem list.    Past Medical History:   Diagnosis Date   • Acute angina (HCC)        Past Surgical History:   Procedure Laterality Date   • CYST REMOVAL     • TUBAL ABDOMINAL LIGATION         Social History     Socioeconomic History   • Marital status:    Tobacco Use   • Smoking status: Former Smoker   • Smokeless tobacco: Never Used   Vaping Use   • Vaping Use: Never used   Substance and Sexual Activity   • Alcohol use: Yes     Comment: occasional   • Drug use: Never   • Sexual activity: Not Currently       Family History   Problem Relation Age of Onset   • Liver disease Other    • Hypertension Other    • Heart disease Other    • Stroke Other    • Heart attack Other    • Liver disease Father    • Dementia Maternal Grandmother         Review of Systems   Constitutional: Negative for chills, fatigue, fever and  "unexpected weight change.   HENT: Negative for ear pain, hearing loss, nosebleeds, rhinorrhea and sore throat.    Eyes: Negative for photophobia, pain, discharge, itching and visual disturbance.   Respiratory: Negative for cough, chest tightness, shortness of breath and wheezing.    Cardiovascular: Negative for chest pain, palpitations and leg swelling.   Gastrointestinal: Negative for abdominal pain, blood in stool, constipation, diarrhea, nausea and vomiting.   Genitourinary: Negative for dysuria, frequency, hematuria and urgency.   Musculoskeletal: Positive for gait problem. Negative for arthralgias, back pain, joint swelling, myalgias, neck pain and neck stiffness.   Skin: Negative for rash and wound.   Allergic/Immunologic: Negative for environmental allergies and food allergies.   Neurological: Negative for dizziness, tremors, seizures, syncope, speech difficulty, weakness, light-headedness, numbness and headaches.   Hematological: Negative for adenopathy. Does not bruise/bleed easily.   Psychiatric/Behavioral: Positive for agitation and confusion. Negative for decreased concentration, hallucinations, sleep disturbance and suicidal ideas. The patient is not nervous/anxious.    All other systems reviewed and are negative.       Objective:  /76   Pulse 86   Ht 152.4 cm (60\")   Wt 70.3 kg (155 lb)   SpO2 98%   BMI 30.27 kg/m²     Neurologic Exam     Mental Status   Oriented to person, place, and time.   Registration: recalls 3 of 3 objects. Recall at 5 minutes: recalls 1 of 3 objects.   Speech: speech is normal   Level of consciousness: alert    Cranial Nerves   Cranial nerves II through XII intact.     Motor Exam   Muscle bulk: normal  Overall muscle tone: normal    Strength   Strength 5/5 throughout.     Gait, Coordination, and Reflexes     Gait  Gait: normal    Coordination   Romberg: negative  Finger to nose coordination: normal  Heel to shin coordination: normal  Tandem walking coordination: " normal    Tremor   Resting tremor: absent  Intention tremor: absent  Action tremor: absent    Reflexes   Right brachioradialis: 2+  Left brachioradialis: 2+  Right biceps: 2+  Left biceps: 2+  Right patellar: 2+  Right achilles: 2+  Left achilles: 2+  Right : 2+  Left : 2+  Normal finger and heel taps bilaterally, excellent balance in her heels, good arm swing, no shuffling gait       Physical Exam  Constitutional:       Appearance: Normal appearance.   Cardiovascular:      Rate and Rhythm: Normal rate and regular rhythm.      Heart sounds: Normal heart sounds, S1 normal and S2 normal.   Pulmonary:      Effort: Pulmonary effort is normal.      Breath sounds: Normal breath sounds.   Neurological:      Mental Status: She is alert and oriented to person, place, and time.      Coordination: Finger-Nose-Finger Test, Heel to Shin Test and Romberg Test normal.      Gait: Gait is intact. Tandem walk normal.      Deep Tendon Reflexes: Strength normal.      Reflex Scores:       Bicep reflexes are 2+ on the right side and 2+ on the left side.       Brachioradialis reflexes are 2+ on the right side and 2+ on the left side.       Patellar reflexes are 2+ on the right side.       Achilles reflexes are 2+ on the right side and 2+ on the left side.  Psychiatric:         Mood and Affect: Mood and affect normal.         Speech: Speech normal.         Behavior: Behavior normal.         Thought Content: Thought content normal.         Cognition and Memory: She exhibits impaired recent memory.         Judgment: Judgment normal.     MMSE 23/30, delayed recall 1/3    Assessment/Plan:       Diagnoses and all orders for this visit:    1. Memory loss (Primary)  -     TSH; Future  -     T4, free; Future  -     Vitamin B12 & Folate; Future  -     Methylmalonic Acid, Serum; Future    2. Mild cognitive impairment with memory loss  -     Ambulatory Referral to Speech Therapy    3. Memory loss, short term  -     memantine (Namenda) 10 MG  tablet; Take 1/2 tab bid x 2 wks then 1 tab in am and 1/2 tab in pm x 2 weeks then 1 tablet twice a day and continue  Dispense: 180 tablet; Refill: 1         Total time of visit today 45 minutes including PCP notes, reviewing imaging, obtaining history from the patient and her daughter, completing neurological and physical exam, discussing plan of care moving forward along with documentation of today's visit.  At this time based on MRI of the brain as well as cognitive evaluation today I am concerned about mild cognitive impairment with short-term memory loss.  Will refer her for cognitive rehab with our speech-language pathologist.  We will check thyroid and vitamin B12, folate and methylmalonic acid today.  Since she is already on the memantine at 5 mg we will increase and titrate this.  If there is no contraindication would recommend donepezil in the future.  I did explain that some of her symptoms could be concerning for very mild Alzheimer's disease.  We will continue to follow closely.  Right now I would not recommend driving without someone in the car.  She certainly needs her daughter to assist in driving and taking her to appointments.  I will complete [facility] paperwork on her behalf.  She will follow-up in 3 to 4 months or sooner if needed.  I did provide them with a handout on mild cognitive impairment along with my card and we will follow-up in our [place] location since this is closer to [place] for them. No Parkinson symptoms on exam today but will continue to monitor with anosmia and rem sleep disorder.   Reviewed medications, potential side effects and signs and symptoms to report. Discussed risk versus benefits of treatment plan with patient and/or family-including medications, labs and radiology that may be ordered. Addressed questions and concerns during visit. Patient and/or family verbalized understanding and agree with plan.    AS THE PROVIDER, I PERSONALLY WORE PPE DURING ENTIRE FACE TO FACE  ENCOUNTER IN CLINIC WITH THE PATIENT. PATIENT ALSO WORE PPE DURING ENTIRE FACE TO FACE ENCOUNTER EXCEPT FOR A MAX OF 30 SECONDS DURING NEUROLOGICAL EVALUATION OF CRANIAL NERVES AND THEN MASK WAS PLACED BACK OVER PATIENT FACE FOR REMAINDER OF VISIT. I WASHED MY HANDS BEFORE AND AFTER VISIT.    During this visit the following were done:  Labs Reviewed [x]    Labs Ordered [x]    Radiology Reports Reviewed [x]    Radiology Ordered []    PCP Records Reviewed [x]    Referring Provider Records Reviewed [x]    ER Records Reviewed []    Hospital Records Reviewed []    History Obtained From Family [x]    Radiology Images Reviewed [x]    Other Reviewed [x]    Records Requested []      Leola Andrews, JEAN-CLAUDE  11/10/2021

## 2021-11-10 NOTE — PATIENT INSTRUCTIONS
memantine 10mg dose (increased)-Take 1/2 tablet in am and 1/2 tablet in pm x 2 weeks then 1 tablet in am and 1/2 tablet in pm x 2 weeks then 1 tablet twice a day and continue

## 2021-11-11 ENCOUNTER — LAB (OUTPATIENT)
Dept: LAB | Facility: HOSPITAL | Age: 71
End: 2021-11-11

## 2021-11-11 DIAGNOSIS — R41.3 MEMORY LOSS: ICD-10-CM

## 2021-11-11 PROCEDURE — 82607 VITAMIN B-12: CPT

## 2021-11-11 PROCEDURE — 84439 ASSAY OF FREE THYROXINE: CPT

## 2021-11-11 PROCEDURE — 36415 COLL VENOUS BLD VENIPUNCTURE: CPT

## 2021-11-11 PROCEDURE — 83921 ORGANIC ACID SINGLE QUANT: CPT

## 2021-11-11 PROCEDURE — 82746 ASSAY OF FOLIC ACID SERUM: CPT

## 2021-11-11 PROCEDURE — 84443 ASSAY THYROID STIM HORMONE: CPT

## 2021-11-12 ENCOUNTER — TELEPHONE (OUTPATIENT)
Dept: NEUROLOGY | Facility: CLINIC | Age: 71
End: 2021-11-12

## 2021-11-12 LAB
FOLATE SERPL-MCNC: >20 NG/ML (ref 4.78–24.2)
T4 FREE SERPL-MCNC: 1.27 NG/DL (ref 0.93–1.7)
TSH SERPL DL<=0.05 MIU/L-ACNC: 0.89 UIU/ML (ref 0.27–4.2)
VIT B12 BLD-MCNC: 359 PG/ML (ref 211–946)

## 2021-11-12 NOTE — PROGRESS NOTES
Please notify patient and her daughter. Thyroid levels are normal. It appears lab was still able to run the Vitamin B12 and Folic acid and those are normal as well. We will follow up as scheduled. Thanks, JEAN-CLAUDE Garland

## 2021-11-12 NOTE — TELEPHONE ENCOUNTER
----- Message from JEAN-CLAUDE Wilson sent at 11/12/2021  8:44 AM EST -----  Please notify patient and her daughter. Thyroid levels are normal. It appears lab was still able to run the Vitamin B12 and Folic acid and those are normal as well. We will follow up as scheduled. Thanks, JEAN-CLAUDE Garland

## 2021-11-16 LAB
Lab: NORMAL
METHYLMALONATE SERPL-SCNC: 126 NMOL/L (ref 0–378)

## 2021-11-17 ENCOUNTER — OFFICE VISIT (OUTPATIENT)
Dept: INTERNAL MEDICINE | Facility: CLINIC | Age: 71
End: 2021-11-17

## 2021-11-17 VITALS
HEIGHT: 60 IN | DIASTOLIC BLOOD PRESSURE: 66 MMHG | TEMPERATURE: 97.2 F | BODY MASS INDEX: 30.67 KG/M2 | HEART RATE: 99 BPM | WEIGHT: 156.2 LBS | SYSTOLIC BLOOD PRESSURE: 128 MMHG | OXYGEN SATURATION: 98 %

## 2021-11-17 DIAGNOSIS — R41.3 MEMORY LOSS, SHORT TERM: Primary | ICD-10-CM

## 2021-11-17 PROCEDURE — 99213 OFFICE O/P EST LOW 20 MIN: CPT | Performed by: NURSE PRACTITIONER

## 2021-11-17 NOTE — PROGRESS NOTES
"Chief Complaint   Patient presents with   • Memory Loss     f/u        HPI  Carolyn Snowden is a 71 y.o. female presents for follow-up on memory loss.  Pt is currently on Namenda.  Pt has been followed by neurology for this.  She increased the Namenda to 10mg.  She has been referred to cognitive rehab with speech pathologist by neuro.  The daughter states that she was not impressed with neurology and she would like a second opinion.  Pt continues to drive herself.  Per the neurology note this is not recommended.  She states that she usually just drives to the grocery store.       The following portions of the patient's history were reviewed and updated as appropriate: allergies, current medications, past family history, past medical history, past social history, past surgical history and problem list.    Subjective  Review of Systems   Constitutional: Negative for activity change, appetite change and fatigue.   HENT: Negative for congestion.    Respiratory: Negative for cough and shortness of breath.    Cardiovascular: Negative for chest pain and leg swelling.   Gastrointestinal: Negative for abdominal pain.   Neurological: Negative for dizziness, weakness and confusion.   Psychiatric/Behavioral: Negative for behavioral problems and decreased concentration.       Objective  Visit Vitals  /66   Pulse 99   Temp 97.2 °F (36.2 °C) (Temporal)   Ht 152.4 cm (60\")   Wt 70.9 kg (156 lb 3.2 oz)   SpO2 98%   BMI 30.51 kg/m²        Physical Exam  Vitals and nursing note reviewed.   HENT:      Head: Normocephalic.   Eyes:      Pupils: Pupils are equal, round, and reactive to light.   Cardiovascular:      Rate and Rhythm: Normal rate and regular rhythm.      Pulses: Normal pulses.      Heart sounds: Normal heart sounds.   Pulmonary:      Effort: Pulmonary effort is normal.      Breath sounds: Normal breath sounds.   Skin:     General: Skin is warm and dry.      Capillary Refill: Capillary refill takes less than 2 " seconds.   Neurological:      General: No focal deficit present.      Mental Status: She is alert and oriented to person, place, and time.      Gait: Gait is intact.   Psychiatric:         Attention and Perception: Attention normal.         Mood and Affect: Mood normal.         Behavior: Behavior normal.         Thought Content: Thought content normal.         Judgment: Judgment normal.          Procedures     Assessment and Plan  Diagnoses and all orders for this visit:    1. Memory loss, short term (Primary)  -     Ambulatory Referral to Neurology    no change in memory with Namenda at this time  Daughter requesting 2nd opinion from neuro  I concur with neurology that pt should not be driving alone, I reiterated with pt and daughter  Schedule Medicare Wellness    Return in about 3 weeks (around 12/8/2021) for Medicare Wellness.         Ezekiel Estrada, APRN

## 2021-11-30 ENCOUNTER — OFFICE VISIT (OUTPATIENT)
Dept: PHYSICAL THERAPY | Facility: CLINIC | Age: 71
End: 2021-11-30

## 2021-11-30 DIAGNOSIS — R41.841 COGNITIVE COMMUNICATION DEFICIT: Primary | ICD-10-CM

## 2021-11-30 PROCEDURE — 96125 COGNITIVE TEST BY HC PRO: CPT | Performed by: SPEECH-LANGUAGE PATHOLOGIST

## 2021-11-30 NOTE — PROGRESS NOTES
Outpatient Speech Language Pathology   Adult Speech Language Cognitive Initial Evaluation       Patient Name: Carolyn Snowden  : 1950  MRN: [id number]  Today's Date: 2021        Visit Date: 2021   Patient Active Problem List   Diagnosis   • Atopic rhinitis   • Diverticulitis of intestine   • Gastroesophageal reflux disease   • Essential hypertension   • REM sleep behavior disorder   • Pure hypercholesterolemia   • Arthritis   • REM sleep behavior disorder   • Esophageal reflux   • Essential hypertension   • Intrinsic asthma without status asthmaticus without complication   • Mild cognitive impairment with memory loss        Past Medical History:   Diagnosis Date   • Acute angina (HCC)         Past Surgical History:   Procedure Laterality Date   • CYST REMOVAL     • TUBAL ABDOMINAL LIGATION           Visit Dx:    ICD-10-CM ICD-9-CM   1. Cognitive communication deficit  R41.841 799.52            OP SLP Assessment/Plan - 21 1100        SLP Assessment    Functional Problems Speech Language- Adult/Cognition  -RB    Impact on Function: Adult Speech Language/Cognition Difficulty communicating wants, needs, and ideas; Difficulty communicating in a medical emergency; Restrictions in personal and social life; Poor attention to task; Trouble learning or remembering new information  -RB    Clinical Impression: Speech Language-Adult/Congnition Moderate-Severe:; Cognitive Communication Impairment  -RB    Functional Problems Comment cog/comm deficits impact participation and communication  -RB    Clinical Impression Comments would benefit from skilled ST  -RB    Please refer to paper survey for additional self-reported information Yes  -RB    Please refer to items scanned into chart for additional diagnostic informaiton and handouts as provided by clinician Yes  -RB    SLP Diagnosis cog/comm  -RB    Prognosis Good (comment)  -RB    Patient/caregiver participated in establishment of treatment plan and  goals Yes  -RB    Patient would benefit from skilled therapy intervention Yes  -RB       SLP Plan    Frequency 1x/weekly  -RB    Duration 10 weeks  -RB    Planned CPT's? SLP INDIVIDUAL SPEECH THERAPY: 22026  -RB    Expected Duration of Therapy Session (SLP Eval) 45  -RB    Plan Comments initiate POC  -RB          User Key  (r) = Recorded By, (t) = Taken By, (c) = Cosigned By    Initials Name Provider Type    RB Jie Ascencio SLP Speech and Language Pathologist                 SLP S[facility] Evaluation - 11/30/21 1100        Communication Assessment/Intervention    Document Type evaluation  -RB    Total Evaluation Minutes, SLP 45  -RB    Subjective Information no complaints  -RB    Patient Observations alert; cooperative; agree to therapy  -RB    Patient/Family/Caregiver Comments/Observations sister  -RB    Patient Effort good  -RB    Symptoms Noted During/After Treatment none  -RB       General Information    Patient Profile Reviewed yes  -RB    Pertinent History Of Current Problem PT came with her sister today. She notes forgetfuless and trouble with STM, recall, word finding, losing her train of thought. Neurology note notes MCI vs early AD. She notes forgetting conversations or if she took her medicines. Reports some attention concerns. Onset noted to be 2020 that has worsened. Her sister reports she often repeats herself in conversations. She lives with her daughter who assists with medicines and appointments. Neurology and PCP recommened pt not drive. Reports some anxiety and depression. Pt's sister notes lack of sleep and loss of appetite. -RB    Precautions/Limitations, Vision corrective lenses needed for reading; WFL; for purposes of eval  -RB    Precautions/Limitations, Hearing WFL; for purposes of eval  -RB    Patient Level of Education not stated  -RB    Prior Level of Function-Communication WFL  -RB    Plans/Goals Discussed with patient and family; agreed upon  -RB    Barriers to Rehab none identified  -RB     Patient's Goals for Discharge functional cognition  -RB    Family Goals for Discharge functional cognition  -RB    Standardized Assessment Used RBANS  -RB       Pain    Additional Documentation Pain Scale: Numbers Pre/Post-Treatment (Group)  -RB       Pain Scale: Numbers Pre/Post-Treatment    Pretreatment Pain Rating 0/10 - no pain  -RB    Posttreatment Pain Rating 0/10 - no pain  -RB       Comprehension Assessment/Intervention    Comprehension Assessment/Intervention Auditory Comprehension  -RB       Auditory Comprehension Assessment/Intervention    Auditory Comprehension (Communication) WNL  -RB       Expression Assessment/Intervention    Expression Assessment/Intervention verbal expression  -RB       Verbal Expression Assessment/Intervention    Conversational Discourse/Fluency delayed responses; circumlocution; mild impairment  -RB       Oral Motor Structure and Function    Oral Motor Structure and Function WFL  -RB       Oral Musculature and Cranial Nerve Assessment    Oral Motor General Assessment WFL  -RB       Motor Speech Assessment/Intervention    Motor Speech Function WFL  -RB       Cursory Voice Assessment/Intervention    Quality and Resonance (Voice) WFL  -RB       Cognitive Assessment Intervention- SLP    Cognitive Function (Cognition) moderate impairment; severe impairment  -RB    Orientation Status (Cognition) mild impairment; time; situation  -RB    Memory (Cognitive) severe impairment; functional; immediate; short-term; delayed; auditory; visual; new learning  -RB    Attention (Cognitive) WFL; sustained; moderate impairment; distracting environment; attention to detail  -RB    Thought Organization (Cognitive) severe impairment; concrete divergent  -RB    Reasoning (Cognitive) WFL; simple  -RB    Problem Solving (Cognitive) WFL; simple  -RB    Executive Function (Cognition) moderate impairment; complex organization; home management activities; self-monitoring/correction; time management  -RB     Pragmatics (Communication) WFL  -RB    Cognition, Comment pt demonstrates deficits with STM, recall, thought ogranization, executive funcitoning, attention, and home management tasks. would benefit from skilled ST  -RB       Standardized Tests    Cognitive/Memory Tests RBANS: Repeatable Battery for the Assessment of Neuropsychological Status  -RB       RBANS- Repeatable Battery for the Assessment of Neuropsychological Status    Immediate Memory Index Score 57  -RB    Immediate Memory Qualitative Description extremely low  -RB    Visuospatial Index Score 100  -RB    Visuospatial Qualitative Description average  -RB    Language Index Score 60  -RB    Language Qualitative Description extremely low  -RB    Attention Index Score 88  -RB    Attention Qualitative Description average  -RB    Delayed Memory Index Score 56  -RB    Delayed Memory Qualitative Description extremely low  -RB    Total Index Score 361  -RB          User Key  (r) = Recorded By, (t) = Taken By, (c) = Cosigned By    Initials Name Provider Type    Jie Kumar, SLP Speech and Language Pathologist               LTGs     Pt and family will implement compensatory strategies to maximize patient’s memory function so patient can continue to participate in daily activities  at 90% with no cues     Pt will be able to remember information needed to participate in avocational activities  at 90% with no cues     STGs  Pt will utilize word finding strategies at 90% with no cues     Pt’s memory skills will be enhanced as reported by patient by utilizing internal memory strategies to recall up to 3 pieces of information after a 5 minute delay     Pt’s memory skills will be enhanced as reported by patient using external memory aides  at 90% with no cues     Pt will demonstrate improved ability to recall information by listening/reading information and answering  Questions  at 90% with no cues  certification: 11/30/21-2/28/22       OP SLP Education     Row Name  11/30/21 1100       Education    Barriers to Learning No barriers identified  -RB    Education Provided Described results of evaluation; Patient expressed understanding of evaluation; Family/caregivers expressed understanding of evaluation  -RB    Assessed Learning needs; Learning motivation; Learning preferences; Learning readiness  -RB    Learning Motivation Strong  -RB    Learning Method Explanation; Demonstration; Teach back; Written materials  -RB    Teaching Response Verbalized understanding; Demonstrated understanding; Reinforcement needed  -RB    Education Comments HEP: cog/comm strategies  -RB          User Key  (r) = Recorded By, (t) = Taken By, (c) = Cosigned By    Initials Name Effective Dates    Jie Kumar SLP 06/16/21 -                 SLP Outcome Measures (last 72 hours)     SLP Outcome Measures     Row Name 11/30/21 1100             SLP Outcome Measures    Outcome Measure Used? Adult NOMS  -RB              Adult FCM Scores    FCM Chosen Memory  -RB      Memory FCM Score 4  -RB            User Key  (r) = Recorded By, (t) = Taken By, (c) = Cosigned By    Initials Name Effective Dates    Jie Kumar SLP 06/16/21 -               I certify that the therapy services are furnished while this patient is under my care.  The services outlined above are required by this patient, and will be reviewed every 90 days.                PHYSICIAN: Leola De Jesus APRN                                    DATE:      Please sign and return via fax to [phone].   Thank you,   [facility].         Jie Ascencio MA CCC-SLP, CBIS  11/30/2021

## 2021-12-06 DIAGNOSIS — I10 ESSENTIAL HYPERTENSION: ICD-10-CM

## 2021-12-07 RX ORDER — NIFEDIPINE 60 MG/1
TABLET, FILM COATED, EXTENDED RELEASE ORAL
Qty: 90 TABLET | Refills: 0 | Status: SHIPPED | OUTPATIENT
Start: 2021-12-07 | End: 2022-03-14

## 2021-12-08 ENCOUNTER — TREATMENT (OUTPATIENT)
Dept: PHYSICAL THERAPY | Facility: CLINIC | Age: 71
End: 2021-12-08

## 2021-12-08 DIAGNOSIS — R41.841 COGNITIVE COMMUNICATION DEFICIT: Primary | ICD-10-CM

## 2021-12-08 PROCEDURE — 92507 TX SP LANG VOICE COMM INDIV: CPT | Performed by: SPEECH-LANGUAGE PATHOLOGIST

## 2021-12-08 NOTE — PROGRESS NOTES
"Outpatient Speech Language Pathology   Adult Speech Language Cognitive Treatment Note       Patient Name: Carolyn Snowden  : 1950  MRN: [id number]  Today's Date: 2021         Visit Date: 2021   Patient Active Problem List   Diagnosis   • Atopic rhinitis   • Diverticulitis of intestine   • Gastroesophageal reflux disease   • Essential hypertension   • REM sleep behavior disorder   • Pure hypercholesterolemia   • Arthritis   • REM sleep behavior disorder   • Esophageal reflux   • Essential hypertension   • Intrinsic asthma without status asthmaticus without complication   • Mild cognitive impairment with memory loss          Visit Dx:    ICD-10-CM ICD-9-CM   1. Cognitive communication deficit  R41.841 799.52   Pain: 0   Subjective: \"bought a journal\"   LTGs     Pt and family will implement compensatory strategies to maximize patient’s memory function so patient can continue to participate in daily activities  at 90% with no cues   -modeled/targeted IM/compensatory strategies: 80%  Pt will be able to remember information needed to participate in avocational activities  at 90% with no cues   -medicine management: 100%, health literacy: 90%, budgetin%, appointment management: 95%  STGs  Pt will utilize word finding strategies at 90% with no cues   -SFA visual aid modeled   Pt’s memory skills will be enhanced as reported by patient by utilizing internal memory strategies to recall up to 3 pieces of information after a 5 minute delay   -provided association handout and modeled   Pt’s memory skills will be enhanced as reported by patient using external memory aides  at 90% with no cues   -calendar management: 95%   Pt will demonstrate improved ability to recall information by listening/reading information and answering  Questions  at 90% with no cues  -health literacy tasks: 90%  certification: 21-22     OP SLP Assessment/Plan - 21 1000        SLP Assessment    Functional Problems " Speech Language- Adult/Cognition  -RB    Impact on Function: Adult Speech Language/Cognition Difficulty communicating wants, needs, and ideas; Difficulty communicating in a medical emergency; Restrictions in personal and social life; Poor attention to task; Trouble learning or remembering new information  -RB    Clinical Impression: Speech Language-Adult/Congnition Moderate-Severe:; Cognitive Communication Impairment  -RB    Functional Problems Comment cog/comm deficits impact participation and communication  -RB    Clinical Impression Comments receptive to tx and strategies presented, reports buying journal for use at home  -RB    Please refer to paper survey for additional self-reported information Yes  -RB    Please refer to items scanned into chart for additional diagnostic informaiton and handouts as provided by clinician Yes  -RB    SLP Diagnosis cog/comm  -RB    Prognosis Good (comment)  -RB    Patient/caregiver participated in establishment of treatment plan and goals Yes  -RB    Patient would benefit from skilled therapy intervention Yes  -RB       SLP Plan    Frequency 1x/weekly  -RB    Duration 9 weeks  -RB    Planned CPT's? SLP INDIVIDUAL SPEECH THERAPY: 68413  -RB    Expected Duration of Therapy Session (SLP Eval) 45  -RB    Plan Comments continue POC  -RB          User Key  (r) = Recorded By, (t) = Taken By, (c) = Cosigned By    Initials Name Provider Type    RB Jie Ascencio SLP Speech and Language Pathologist                   OP SLP Education     Row Name 12/08/21 1000       Education    Barriers to Learning No barriers identified  -RB    Education Provided Patient demonstrated recommended strategies; Family/caregivers demonstrated recommended strategies; Patient requires further education on strategies, risks; Family/caregivers require further education on strategies, risks  -RB    Assessed Learning motivation; Learning needs; Learning preferences; Learning readiness  -RB    Learning Motivation  Strong  -RB    Learning Method Demonstration; Explanation; Teach back; Written materials  -RB    Teaching Response Verbalized understanding; Demonstrated understanding; Reinforcement needed  -RB    Education Comments HEP: SFA visual, association handout  -RB          User Key  (r) = Recorded By, (t) = Taken By, (c) = Cosigned By    Initials Name Effective Dates    Jie Kumar SLP 06/16/21 -                       Jie Ascencio MA CCC-SLP, [facility]  12/8/2021

## 2021-12-15 ENCOUNTER — TREATMENT (OUTPATIENT)
Dept: PHYSICAL THERAPY | Facility: CLINIC | Age: 71
End: 2021-12-15

## 2021-12-15 DIAGNOSIS — R41.841 COGNITIVE COMMUNICATION DEFICIT: Primary | ICD-10-CM

## 2021-12-15 PROCEDURE — 92507 TX SP LANG VOICE COMM INDIV: CPT | Performed by: SPEECH-LANGUAGE PATHOLOGIST

## 2021-12-15 NOTE — PROGRESS NOTES
Outpatient Speech Language Pathology   Adult Speech Language Cognitive Treatment Note       Patient Name: Carolyn Snowden  : 1950  MRN: [id number]  Today's Date: 12/15/2021         Visit Date: 12/15/2021   Patient Active Problem List   Diagnosis   • Atopic rhinitis   • Diverticulitis of intestine   • Gastroesophageal reflux disease   • Essential hypertension   • REM sleep behavior disorder   • Pure hypercholesterolemia   • Arthritis   • REM sleep behavior disorder   • Esophageal reflux   • Essential hypertension   • Intrinsic asthma without status asthmaticus without complication   • Mild cognitive impairment with memory loss          Visit Dx:    ICD-10-CM ICD-9-CM   1. Cognitive communication deficit  R41.841 799.52     Pain: 0   Subjective: Pt's daughter came with her today.    LTGs     Pt and family will implement compensatory strategies to maximize patient’s memory function so patient can continue to participate in daily activities  at 90% with no cues   -modeled/targeted IM/compensatory strategies: 80%  Pt will be able to remember information needed to participate in avocational activities  at 90% with no cues  -targeted functional tasks: 80%  STGs  Pt will utilize word finding strategies at 90% with no cues   -SFA visual aid modeled/targeted. 85%  Pt’s memory skills will be enhanced as reported by patient by utilizing internal memory strategies to recall up to 3 pieces of information after a 5 minute delay   -name recall: 4/4 with min phonemic cues 5 min delay  Grocery list: 5/5 with semantic cues 5 min delay   Pt’s memory skills will be enhanced as reported by patient using external memory aides  at 90% with no cues   -discussed journal use at home   Pt will demonstrate improved ability to recall information by listening/reading information and answering  Questions  at 90% with no cues  -article recall: 80%  certification: 21-22   OP SLP Assessment/Plan - 12/15/21 1000        SLP  Assessment    Functional Problems Speech Language- Adult/Cognition  -RB    Impact on Function: Adult Speech Language/Cognition Difficulty communicating wants, needs, and ideas; Difficulty communicating in a medical emergency; Restrictions in personal and social life; Poor attention to task; Trouble learning or remembering new information  -RB    Clinical Impression: Speech Language-Adult/Congnition Moderate-Severe:; Cognitive Communication Impairment  -RB    Functional Problems Comment cog/comm deficits impact participation and communication  -RB    Clinical Impression Comments following HEP, discussed visual aids/strategies, pt reports trying to utilize at home  -RB    Please refer to paper survey for additional self-reported information Yes  -RB    Please refer to items scanned into chart for additional diagnostic informaiton and handouts as provided by clinician Yes  -RB    SLP Diagnosis cog/comm  -RB    Prognosis Good (comment)  -RB    Patient/caregiver participated in establishment of treatment plan and goals Yes  -RB    Patient would benefit from skilled therapy intervention Yes  -RB       SLP Plan    Frequency 1x/weekly  -RB    Duration 8 weeks  -RB    Planned CPT's? SLP INDIVIDUAL SPEECH THERAPY: 71048  -RB    Expected Duration of Therapy Session (SLP Eval) 45  -RB    Plan Comments continue POC  -RB          User Key  (r) = Recorded By, (t) = Taken By, (c) = Cosigned By    Initials Name Provider Type    Jie Kumar, SLP Speech and Language Pathologist                 OP SLP Education     Row Name 12/15/21 1000       Education    Barriers to Learning No barriers identified  -RB    Education Provided Patient demonstrated recommended strategies; Family/caregivers demonstrated recommended strategies; Patient requires further education on strategies, risks; Family/caregivers require further education on strategies, risks  -RB    Assessed Learning readiness; Learning preferences; Learning motivation;  Learning needs  -RB    Learning Motivation Strong  -RB    Learning Method Explanation; Demonstration; Teach back; Written materials  -RB    Teaching Response Verbalized understanding; Reinforcement needed; Demonstrated understanding  -RB    Education Comments HEP: grouping handout  -RB          User Key  (r) = Recorded By, (t) = Taken By, (c) = Cosigned By    Initials Name Effective Dates    Jie Kumar SLP 06/16/21 -                         Jie Ascencio MA CCC-ADRIANA, [facility]  12/15/2021

## 2021-12-22 ENCOUNTER — TREATMENT (OUTPATIENT)
Dept: PHYSICAL THERAPY | Facility: CLINIC | Age: 71
End: 2021-12-22

## 2021-12-22 DIAGNOSIS — R26.89 BALANCE PROBLEM: Primary | ICD-10-CM

## 2021-12-22 DIAGNOSIS — R41.841 COGNITIVE COMMUNICATION DEFICIT: ICD-10-CM

## 2021-12-22 PROCEDURE — 92507 TX SP LANG VOICE COMM INDIV: CPT | Performed by: SPEECH-LANGUAGE PATHOLOGIST

## 2021-12-22 NOTE — PROGRESS NOTES
Outpatient Speech Language Pathology   Adult Speech Language Cognitive Treatment Note       Patient Name: Carolyn Snowden  : 1950  MRN: [id number]  Today's Date: 2021         Visit Date: 2021   Patient Active Problem List   Diagnosis   • Atopic rhinitis   • Diverticulitis of intestine   • Gastroesophageal reflux disease   • Essential hypertension   • REM sleep behavior disorder   • Pure hypercholesterolemia   • Arthritis   • REM sleep behavior disorder   • Esophageal reflux   • Essential hypertension   • Intrinsic asthma without status asthmaticus without complication   • Mild cognitive impairment with memory loss          Visit Dx:    ICD-10-CM ICD-9-CM   1. Balance problem  R26.89 781.99   2. Cognitive communication deficit  R41.841 799.52   Pain: 0   Subjective: Pt's daughter came with her today. Pt's daughter reports balance concerns and a fall.     LTGs     Pt and family will implement compensatory strategies to maximize patient’s memory function so patient can continue to participate in daily activities  at 90% with no cues   -modeled/targeted IM/compensatory strategies: 80%  Pt will be able to remember information needed to participate in avocational activities  at 90% with no cues  -targeted functional tasks: 80%  STGs  Pt will utilize word finding strategies at 90% with no cues   -SFA visual aid modeled/targeted. 85%  Pt’s memory skills will be enhanced as reported by patient by utilizing internal memory strategies to recall up to 3 pieces of information after a 5 minute delay  Grocery list: 5/6  5 min delay   Pt’s memory skills will be enhanced as reported by patient using external memory aides  at 90% with no cues   -discussed journal, organization aide, calendar, and timer use at home. Gave examples nad modeled   -note takin%  Pt will demonstrate improved ability to recall information by listening/reading information and answering  Questions  at 90% with no cues  -article  recall: 80%  certification: 11/30/21-2/28/22     OP SLP Assessment/Plan - 12/22/21 1000        SLP Assessment    Functional Problems Speech Language- Adult/Cognition  -RB    Impact on Function: Adult Speech Language/Cognition Difficulty communicating wants, needs, and ideas; Difficulty communicating in a medical emergency; Restrictions in personal and social life; Poor attention to task; Trouble learning or remembering new information  -RB    Clinical Impression: Speech Language-Adult/Congnition Moderate-Severe:; Cognitive Communication Impairment  -RB    Functional Problems Comment cog/comm deficits impact participation and communication  -RB    Clinical Impression Comments following HEP, discussed visual aids/strategies, pt reports trying to utilize at home, recpetive to tx and strategies  -RB    Please refer to paper survey for additional self-reported information Yes  -RB    Please refer to items scanned into chart for additional diagnostic informaiton and handouts as provided by clinician Yes  -RB    SLP Diagnosis cog/comm  -RB    Prognosis Good (comment)  -RB    Patient/caregiver participated in establishment of treatment plan and goals Yes  -RB    Patient would benefit from skilled therapy intervention Yes  -RB       SLP Plan    Frequency 1x/weekly  -RB    Duration 7 weeks  -RB    Planned CPT's? SLP INDIVIDUAL SPEECH THERAPY: 41691  -RB    Expected Duration of Therapy Session (SLP Eval) 45  -RB    Plan Comments continue POC  -RB          User Key  (r) = Recorded By, (t) = Taken By, (c) = Cosigned By    Initials Name Provider Type    Jie Kumar SLP Speech and Language Pathologist                 OP SLP Education     Row Name 12/22/21 1000       Education    Barriers to Learning No barriers identified  -RB    Education Provided Patient demonstrated recommended strategies; Patient requires further education on strategies, risks; Family/caregivers demonstrated recommended strategies; Family/caregivers  require further education on strategies, risks  -RB    Assessed Learning needs; Learning preferences; Learning readiness; Learning motivation  -RB    Learning Motivation Strong  -RB    Learning Method Explanation; Demonstration; Teach back; Written materials  -RB    Teaching Response Verbalized understanding; Demonstrated understanding; Reinforcement needed  -RB    Education Comments HEP: external memory aid sheets, visualization handout  -RB          User Key  (r) = Recorded By, (t) = Taken By, (c) = Cosigned By    Initials Name Effective Dates    Jie Kumar SLP 06/16/21 -                       Jie Ascencio MA CCC-SLP, [facility]  12/22/2021

## 2022-01-05 ENCOUNTER — TREATMENT (OUTPATIENT)
Dept: PHYSICAL THERAPY | Facility: CLINIC | Age: 72
End: 2022-01-05

## 2022-01-05 DIAGNOSIS — R41.841 COGNITIVE COMMUNICATION DEFICIT: Primary | ICD-10-CM

## 2022-01-05 DIAGNOSIS — R26.89 BALANCE PROBLEM: Primary | ICD-10-CM

## 2022-01-05 PROCEDURE — 97110 THERAPEUTIC EXERCISES: CPT | Performed by: PHYSICAL THERAPIST

## 2022-01-05 PROCEDURE — 97161 PT EVAL LOW COMPLEX 20 MIN: CPT | Performed by: PHYSICAL THERAPIST

## 2022-01-05 PROCEDURE — 92507 TX SP LANG VOICE COMM INDIV: CPT | Performed by: SPEECH-LANGUAGE PATHOLOGIST

## 2022-01-05 NOTE — PROGRESS NOTES
Outpatient Speech Language Pathology   Adult Speech Language Cognitive Treatment Note       Patient Name: Carolyn Snowden  : 1950  MRN: [id number]  Today's Date: 2022         Visit Date: 2022   Patient Active Problem List   Diagnosis   • Atopic rhinitis   • Diverticulitis of intestine   • Gastroesophageal reflux disease   • Essential hypertension   • REM sleep behavior disorder   • Pure hypercholesterolemia   • Arthritis   • REM sleep behavior disorder   • Esophageal reflux   • Essential hypertension   • Intrinsic asthma without status asthmaticus without complication   • Mild cognitive impairment with memory loss          Visit Dx:    ICD-10-CM ICD-9-CM   1. Cognitive communication deficit  R41.841 799.52   Pain: 0   Subjective: Pt had PT evaluation today. PT reports attending some [employer] services and participating more.    LTGs     Pt and family will implement compensatory strategies to maximize patient’s memory function so patient can continue to participate in daily activities  at 90% with no cues   -modeled/targeted IM/compensatory strategies: 80%  Pt will be able to remember information needed to participate in avocational activities  at 90% with no cues  -targeted functional tasks: 80%  STGs  Pt will utilize word finding strategies at 90% with no cues   -SFA visual aid modeled/targeted. 85%  Pt’s memory skills will be enhanced as reported by patient by utilizing internal memory strategies to recall up to 3 pieces of information after a 5 minute delay  Grocery list: 3/5 5 min delay   Name recall: 3/4 after 5 min delay  Pt’s memory skills will be enhanced as reported by patient using external memory aides  at 90% with no cues  -note takin%  -calendar task: 100%  Pt will demonstrate improved ability to recall information by listening/reading information and answering  Questions  at 90% with no cues  -article recall: 85% x2  -5 min podcast: 90%  certification: 21-22     OP SLP  Assessment/Plan - 01/05/22 0900        SLP Assessment    Functional Problems Speech Language- Adult/Cognition  -RB    Impact on Function: Adult Speech Language/Cognition Difficulty communicating wants, needs, and ideas; Difficulty communicating in a medical emergency; Restrictions in personal and social life; Poor attention to task; Trouble learning or remembering new information  -RB    Clinical Impression: Speech Language-Adult/Congnition Moderate-Severe:; Cognitive Communication Impairment  -RB    Functional Problems Comment cog/comm deficits impact participation and communication  -RB    Clinical Impression Comments following HEP, discussed visual aids/strategies, pt reports trying to utilize at home, recpetive to tx and strategies. pt reports participating in her community more which is a goal  -RB    Please refer to paper survey for additional self-reported information Yes  -RB    Please refer to items scanned into chart for additional diagnostic informaiton and handouts as provided by clinician Yes  -RB    SLP Diagnosis cog/comm  -RB    Prognosis Good (comment)  -RB    Patient/caregiver participated in establishment of treatment plan and goals Yes  -RB    Patient would benefit from skilled therapy intervention Yes  -RB       SLP Plan    Frequency 1x/weekly  -RB    Duration 6 weeks  -RB    Planned CPT's? SLP INDIVIDUAL SPEECH THERAPY: 86346  -RB    Expected Duration of Therapy Session (SLP Eval) 45  -RB    Plan Comments continue POC  -RB          User Key  (r) = Recorded By, (t) = Taken By, (c) = Cosigned By    Initials Name Provider Type    Jie Kumar SLP Speech and Language Pathologist                   OP SLP Education     Row Name 01/05/22 0900       Education    Barriers to Learning No barriers identified  -RB    Education Provided Patient demonstrated recommended strategies; Patient requires further education on strategies, risks  -RB    Assessed Learning needs; Learning motivation; Learning  preferences; Learning readiness  -RB    Learning Motivation Strong  -RB    Learning Method Explanation; Demonstration; Teach back; Written materials  -RB    Teaching Response Reinforcement needed; Demonstrated understanding; Verbalized understanding  -RB    Education Comments HEP: rehearsal handout  -RB          User Key  (r) = Recorded By, (t) = Taken By, (c) = Cosigned By    Initials Name Effective Dates    Jie Kumar SLP 06/16/21 -               SLP Outcome Measures (last 72 hours)     SLP Outcome Measures     Row Name 01/05/22 0900             Adult FCM Scores    Memory FCM Score 4  -RB            User Key  (r) = Recorded By, (t) = Taken By, (c) = Cosigned By    Initials Name Effective Dates    Jie Kumar SLP 06/16/21 -                       Jie Ascencio MA CCC-SLP, [facility]  1/5/2022

## 2022-01-05 NOTE — PROGRESS NOTES
Physical Therapy Initial Evaluation and Plan of Care      Patient: Carolyn Snowden   : 1950  Diagnosis/ICD-10 Code:  Balance problem [R26.89]  Referring practitioner: LEW Wilson*  Date of Initial Visit: 2022  Today's Date: 2022  Patient seen for 1 sessions           Subjective Questionnaire: n/a      Subjective Evaluation    History of Present Illness  Mechanism of injury: Pt reports that she started being off balance about a year ago when she walks.  Pt reports no falls.  Pt does not use an AD.  Pt I with ADLs and driving. Pt has sister that lives nearby.  Pt reports that she mostly looses her balance with walking and pt reports feeling dizzy when she gets up too quick. [facility] [place] but hasn't been in a little over a year.  Pt lives within walking distance and would like to return at some point.       Patient Occupation: retired from [employer] in  Quality of life: excellent    Pain  No pain reported    Social Support  Lives in: one-story house ([facility])  Lives with: adult children    Hand dominance: right    Treatments  No previous or current treatments  Patient Goals  Patient goals for therapy: improved balance and increased strength             Objective          Neurological Testing     Sensation     Ankle/Foot   Left Ankle/Foot   Intact: light touch and proprioception    Right Ankle/Foot   Intact: light touch and proprioception     Strength/Myotome Testing     Left Hip   Planes of Motion   Flexion: 4-  Extension: 4-  Abduction: 4-  Adduction: 4-    Right Hip   Planes of Motion   Flexion: 4-  Extension: 4-  Abduction: 4-  Adduction: 4-    Left Knee   Flexion: 4-  Extension: 4-    Right Knee   Flexion: 4-  Extension: 4-    Left Ankle/Foot   Dorsiflexion: 4-  Plantar flexion: 4-    Right Ankle/Foot   Dorsiflexion: 4-  Plantar flexion: 4-    Ambulation     Ambulation: Stairs   Ascend stairs: independent  Pattern:  reciprocal  Railings: two rails  Descend stairs: independent  Pattern: reciprocal  Railings: two rails    Observational Gait   Stride length within functional limits. Decreased walking speed.     Additional Observational Gait Details  Pt veers towards the right with ambulation        PT Neuro         Assessment & Plan     Assessment  Impairments: abnormal gait, activity intolerance, impaired balance, impaired physical strength and lacks appropriate home exercise program  Functional Limitations: walking  Assessment details: Pt presents with stable impairments following imbalance diagnosis.  Pt issued HEP for balance, see for details.  Pt to benefit from skilled PT services to improve gait, balance, strength and overall functional mobility.  Prognosis: good    Goals  Plan Goals: STG (4 visits)  1. Patient to improve VIVEROS balance score to >/= 48 /56 to decrease client's risk of falls.  2. Patient to perform TUG within 9.5 sec without LOB for improved functional mobility.  3. Patient to ambulate 10 meters without AD within 12 sec without LOB for improved gait johnathan and functional mobility.  4. Patient to improve FGA score to >/= 22 /30 to decrease client's risk of falls.    LTG (8 visits)  1. Patient to improve VIVEROS balance score to >/= 53 /56 to decrease client's risk of falls.  2. Patient to perform TUG within 8.5 sec without LOB for improved functional mobility.  3. Patient to ambulate 10 meters without AD within 11 sec without LOB for improved gait johnathan and functional mobility.  4. Patient to improve FGA score to >/= 26 /30 to decrease client's risk of falls.  5. Patient to be I with HEP.    Plan  Therapy options: will be seen for skilled therapy services  Planned therapy interventions: gait training, home exercise program, neuromuscular re-education, strengthening, abdominal trunk stabilization and balance/weight-bearing training  Frequency: 1x week  Duration in visits: 8  Treatment plan discussed with:  patient  Plan details: Patient will be seen 1x/wk x 8 visits with treatment to include strengthening, neuromuscular re-education, balance, gait and endurance training.         Timed:  Manual Therapy:    0     mins  82821;  Therapeutic Exercise:    10     mins  95923;     Neuromuscular Nuris:    0    mins  66649;    Therapeutic Activity:     0     mins  13209;     Gait Trainin     mins  32047;     Ultrasound:     0     mins  44090;    Electrical Stimulation:    0     mins  69988 ( );    Untimed:  Electrical Stimulation:    0     mins  59644 ( );  Mechanical Traction:    0     mins  74745;     Timed Treatment:   10   mins   Total Treatment:     454   mins    PT SIGNATURE: Chelly Goel, PT   DATE TREATMENT INITIATED: 2022    Initial Certification  Certification Period: [id number]/4/2022  I certify that the therapy services are furnished while this patient is under my care.  The services outlined above are required by this patient, and will be reviewed every 90 days.     PHYSICIAN: Leola Andrews, APRN      DATE:     Please sign and return via fax to [phone].. Thank you, [facility].

## 2022-01-12 ENCOUNTER — TREATMENT (OUTPATIENT)
Dept: PHYSICAL THERAPY | Facility: CLINIC | Age: 72
End: 2022-01-12

## 2022-01-12 DIAGNOSIS — R26.89 BALANCE PROBLEM: Primary | ICD-10-CM

## 2022-01-12 DIAGNOSIS — R41.841 COGNITIVE COMMUNICATION DEFICIT: Primary | ICD-10-CM

## 2022-01-12 PROCEDURE — 97112 NEUROMUSCULAR REEDUCATION: CPT | Performed by: PHYSICAL THERAPIST

## 2022-01-12 PROCEDURE — 92507 TX SP LANG VOICE COMM INDIV: CPT | Performed by: SPEECH-LANGUAGE PATHOLOGIST

## 2022-01-12 PROCEDURE — 97110 THERAPEUTIC EXERCISES: CPT | Performed by: PHYSICAL THERAPIST

## 2022-01-12 NOTE — PROGRESS NOTES
"Outpatient Speech Language Pathology   Adult Speech Language Cognitive Treatment Note       Patient Name: Carolyn Snowden  : 1950  MRN: [id number]  Today's Date: 2022         Visit Date: 2022   Patient Active Problem List   Diagnosis   • Atopic rhinitis   • Diverticulitis of intestine   • Gastroesophageal reflux disease   • Essential hypertension   • REM sleep behavior disorder   • Pure hypercholesterolemia   • Arthritis   • REM sleep behavior disorder   • Esophageal reflux   • Essential hypertension   • Intrinsic asthma without status asthmaticus without complication   • Mild cognitive impairment with memory loss          Visit Dx:    ICD-10-CM ICD-9-CM   1. Cognitive communication deficit  R41.841 799.52   Pain: 0   Subjective:\"using Karly and doing crosswords everyday\"   LTGs     Pt and family will implement compensatory strategies to maximize patient’s memory function so patient can continue to participate in daily activities  at 90% with no cues   -modeled/targeted IM/compensatory strategies: 80%  Pt will be able to remember information needed to participate in avocational activities  at 90% with no cues  -targeted functional tasks: 80%  STGs  Pt will utilize word finding strategies at 90% with no cues   -SFA visual aid modeled/targeted. 85%  Pt’s memory skills will be enhanced as reported by patient by utilizing internal memory strategies to recall up to 3 pieces of information after a 5 minute delay  Grocery list: 6/6  5 min delay min prompts  Name recall: 4/4 after 5 min delay min prompts  Pt’s memory skills will be enhanced as reported by patient using external memory aides  at 90% with no cues  -note takin%  -calendar task: 85%  Pt reports using Karly at home to set alarms and timers and get oriented to the day/time   Pt will demonstrate improved ability to recall information by listening/reading information and answering  Questions  at 90% with no cues  -article recall: 85%   -7 " min podcast: 90%  certification: 11/30/21-2/28/22     OP SLP Assessment/Plan - 01/12/22 1000        SLP Assessment    Functional Problems Speech Language- Adult/Cognition  -RB    Impact on Function: Adult Speech Language/Cognition Difficulty communicating in a medical emergency; Restrictions in personal and social life; Difficulty communicating wants, needs, and ideas; Poor attention to task; Trouble learning or remembering new information  -RB    Clinical Impression: Speech Language-Adult/Congnition Moderate-Severe:; Cognitive Communication Impairment  -RB    Functional Problems Comment cog/comm deficits impact participation and communication  -RB    Clinical Impression Comments follwoing HEP, using external aids at home with success  -RB    Please refer to paper survey for additional self-reported information Yes  -RB    Please refer to items scanned into chart for additional diagnostic informaiton and handouts as provided by clinician Yes  -RB    SLP Diagnosis cog/comm  -RB    Prognosis Good (comment)  -RB    Patient/caregiver participated in establishment of treatment plan and goals Yes  -RB    Patient would benefit from skilled therapy intervention Yes  -RB       SLP Plan    Frequency 1x/weekly  -RB    Duration 5 weeks  -RB    Planned CPT's? SLP INDIVIDUAL SPEECH THERAPY: 66021  -RB    Expected Duration of Therapy Session (SLP Eval) 45  -RB    Plan Comments continue POC  -RB          User Key  (r) = Recorded By, (t) = Taken By, (c) = Cosigned By    Initials Name Provider Type    Jie Kumar SLP Speech and Language Pathologist                   OP SLP Education     Row Name 01/12/22 1000       Education    Barriers to Learning No barriers identified  -RB    Education Provided Patient demonstrated recommended strategies; Family/caregivers demonstrated recommended strategies; Patient requires further education on strategies, risks; Family/caregivers require further education on strategies, risks  -RB     Assessed Learning readiness; Learning preferences; Learning motivation; Learning needs  -RB    Learning Motivation Strong  -RB    Learning Method Explanation; Demonstration; Teach back; Written materials  -RB    Teaching Response Verbalized understanding; Demonstrated understanding; Reinforcement needed  -RB    Education Comments HEP: elaboration handout  -RB          User Key  (r) = Recorded By, (t) = Taken By, (c) = Cosigned By    Initials Name Effective Dates    Jie Kumar SLP 06/16/21 -                         Jie Ascencio MA CCC-SLP, [facility]  1/12/2022

## 2022-01-12 NOTE — PROGRESS NOTES
"Physical Therapy Daily Treatment Note  Visit: 2  Date of Initial Visit: Type: THERAPY  Noted: 2022    Carolyn Snowden reports: \"I didn't get a chance to do my exercises.\"    Subjective Evaluation    Pain  No pain reported           Objective   See Exercise, Manual, and Modality Logs for complete treatment.    Exercise 1  Exercise Name 1: NuSTep B UE/LEs  Equipment/Resistance 1: level 6  Time: 8 min  Exercise 2  Exercise Name 2: St balance on blue foam with alternating tapping step; one foot on blue foam and other on step and holding balance with EC and then added EO with B cervical rot/flex/ext; B fwd step ups onto/off step without UE A  Equipment/Resistance 2: blue foam; 10\" step; min A  Sets/Reps 2: 10  Exercise 3  Exercise Name 3: B fwd and rotational lunge to BOSU without UE A; repeat with fwd and rotational lunge to BOSU with toss/catch ball on/off rebounder  Equipment/Resistance 3: BOSU; ball; rebounder; min A  Sets/Reps 3: 10  Exercise 4  Exercise Name 4: DLP  Equipment/Resistance 4: total gym  Sets/Reps 4: 1  Time 4: 2 min  Exercise 5  Exercise Name 5: Seated stool pull  Time 5: 2 min         PT Neuro          Assessment & Plan     Assessment    Assessment details: Pt requires min A with standing dynamic balance activities with LOB up to 30% of the time.  Pt to benefit from skilled PT services to improve overall functional mobility.    Plan  Plan details: Pt to benefit from skilled PT services to improve gait, balance, strength, and overall functional mobility.          Manual Therapy:     0     mins  15070;  Therapeutic Exercise:     15     mins  89805;     Neuromuscular Nuris:     30    mins  80417;    Therapeutic Activity:      0     mins  85353;     Gait Trainin     mins  77576;     Ultrasound:      0     mins  24323;    Electrical Stimulation:     0     mins  72369 ( );  Dry Needling      0     mins self-pay  Traction      0     mins 79308  Canalith Repositioning    0     mins " 87061      Timed Treatment:   45   mins   Total Treatment:     45   mins    Chelly Goel, PT  [place] License #: [id number]    Physical Therapist

## 2022-01-13 ENCOUNTER — LAB (OUTPATIENT)
Dept: LAB | Facility: HOSPITAL | Age: 72
End: 2022-01-13

## 2022-01-13 ENCOUNTER — OFFICE VISIT (OUTPATIENT)
Dept: INTERNAL MEDICINE | Facility: CLINIC | Age: 72
End: 2022-01-13

## 2022-01-13 VITALS
BODY MASS INDEX: 30.31 KG/M2 | TEMPERATURE: 97.8 F | HEART RATE: 85 BPM | OXYGEN SATURATION: 98 % | SYSTOLIC BLOOD PRESSURE: 118 MMHG | HEIGHT: 60 IN | DIASTOLIC BLOOD PRESSURE: 60 MMHG | WEIGHT: 154.4 LBS

## 2022-01-13 DIAGNOSIS — Z86.39 HISTORY OF IRON DEFICIENCY: ICD-10-CM

## 2022-01-13 DIAGNOSIS — E78.2 MIXED HYPERLIPIDEMIA: ICD-10-CM

## 2022-01-13 DIAGNOSIS — I10 ESSENTIAL HYPERTENSION: ICD-10-CM

## 2022-01-13 DIAGNOSIS — Z00.00 MEDICARE ANNUAL WELLNESS VISIT, SUBSEQUENT: Primary | ICD-10-CM

## 2022-01-13 DIAGNOSIS — R41.3 MEMORY LOSS, SHORT TERM: ICD-10-CM

## 2022-01-13 LAB
BASOPHILS # BLD AUTO: 0.07 10*3/MM3 (ref 0–0.2)
BASOPHILS NFR BLD AUTO: 1.2 % (ref 0–1.5)
DEPRECATED RDW RBC AUTO: 40.9 FL (ref 37–54)
EOSINOPHIL # BLD AUTO: 0.13 10*3/MM3 (ref 0–0.4)
EOSINOPHIL NFR BLD AUTO: 2.2 % (ref 0.3–6.2)
ERYTHROCYTE [DISTWIDTH] IN BLOOD BY AUTOMATED COUNT: 13.3 % (ref 12.3–15.4)
HCT VFR BLD AUTO: 40.3 % (ref 34–46.6)
HGB BLD-MCNC: 13.2 G/DL (ref 12–15.9)
IMM GRANULOCYTES # BLD AUTO: 0.01 10*3/MM3 (ref 0–0.05)
IMM GRANULOCYTES NFR BLD AUTO: 0.2 % (ref 0–0.5)
LYMPHOCYTES # BLD AUTO: 2.69 10*3/MM3 (ref 0.7–3.1)
LYMPHOCYTES NFR BLD AUTO: 45.2 % (ref 19.6–45.3)
MCH RBC QN AUTO: 27.6 PG (ref 26.6–33)
MCHC RBC AUTO-ENTMCNC: 32.8 G/DL (ref 31.5–35.7)
MCV RBC AUTO: 84.1 FL (ref 79–97)
MONOCYTES # BLD AUTO: 0.52 10*3/MM3 (ref 0.1–0.9)
MONOCYTES NFR BLD AUTO: 8.7 % (ref 5–12)
NEUTROPHILS NFR BLD AUTO: 2.53 10*3/MM3 (ref 1.7–7)
NEUTROPHILS NFR BLD AUTO: 42.5 % (ref 42.7–76)
NRBC BLD AUTO-RTO: 0.2 /100 WBC (ref 0–0.2)
PLATELET # BLD AUTO: 287 10*3/MM3 (ref 140–450)
PMV BLD AUTO: 11.5 FL (ref 6–12)
RBC # BLD AUTO: 4.79 10*6/MM3 (ref 3.77–5.28)
WBC NRBC COR # BLD: 5.95 10*3/MM3 (ref 3.4–10.8)

## 2022-01-13 PROCEDURE — 84466 ASSAY OF TRANSFERRIN: CPT | Performed by: NURSE PRACTITIONER

## 2022-01-13 PROCEDURE — 85025 COMPLETE CBC W/AUTO DIFF WBC: CPT | Performed by: NURSE PRACTITIONER

## 2022-01-13 PROCEDURE — 80053 COMPREHEN METABOLIC PANEL: CPT | Performed by: NURSE PRACTITIONER

## 2022-01-13 PROCEDURE — 80061 LIPID PANEL: CPT | Performed by: NURSE PRACTITIONER

## 2022-01-13 PROCEDURE — G0439 PPPS, SUBSEQ VISIT: HCPCS | Performed by: NURSE PRACTITIONER

## 2022-01-13 PROCEDURE — 83540 ASSAY OF IRON: CPT | Performed by: NURSE PRACTITIONER

## 2022-01-13 PROCEDURE — 36415 COLL VENOUS BLD VENIPUNCTURE: CPT | Performed by: NURSE PRACTITIONER

## 2022-01-13 PROCEDURE — 82728 ASSAY OF FERRITIN: CPT | Performed by: NURSE PRACTITIONER

## 2022-01-13 NOTE — PROGRESS NOTES
The ABCs of the Annual Wellness Visit  Subsequent Medicare Wellness Visit    Chief Complaint   Patient presents with   • Medicare Wellness-subsequent       Subjective   History of Present Illness:  Carolyn Snowden is a 71 y.o. female who presents for a Subsequent Medicare Wellness Visit.    HEALTH RISK ASSESSMENT    Recent Hospitalizations:  No hospitalization(s) within the last year.    Current Medical Providers:  Patient Care Team:  Ezekiel Estrada APRN as PCP - General (Family Medicine)    Smoking Status:  Social History     Tobacco Use   Smoking Status Former Smoker   Smokeless Tobacco Never Used       Alcohol Consumption:  Social History     Substance and Sexual Activity   Alcohol Use Yes    Comment: occasional       Depression Screen:   PHQ-2/PHQ-9 Depression Screening 1/13/2022   Little interest or pleasure in doing things 1   Feeling down, depressed, or hopeless 1   Trouble falling or staying asleep, or sleeping too much 1   Feeling tired or having little energy 1   Poor appetite or overeating 1   Feeling bad about yourself - or that you are a failure or have let yourself or your family down 0   Trouble concentrating on things, such as reading the newspaper or watching television 1   Moving or speaking so slowly that other people could have noticed. Or the opposite - being so fidgety or restless that you have been moving around a lot more than usual 0   Thoughts that you would be better off dead, or of hurting yourself in some way 0   Total Score 6   If you checked off any problems, how difficult have these problems made it for you to do your work, take care of things at home, or get along with other people? Not difficult at all       Fall Risk Screen:  STEADI Fall Risk Assessment was completed, and patient is at LOW risk for falls.Assessment completed on:1/13/2022    Health Habits and Functional and Cognitive Screening:  Functional & Cognitive Status 1/13/2022   Do you have difficulty preparing food  and eating? No   Do you have difficulty bathing yourself, getting dressed or grooming yourself? No   Do you have difficulty using the toilet? No   Do you have difficulty moving around from place to place? No   Do you have trouble with steps or getting out of a bed or a chair? No   Current Diet Frequent Junk Food   Dental Exam -   Eye Exam -   Exercise (times per week) 0 times per week   Current Exercises Include Yard Work   Current Exercise Activities Include -   Do you need help using the phone?  No   Are you deaf or do you have serious difficulty hearing?  No   Do you need help with transportation? No   Do you need help shopping? No   Do you need help preparing meals?  No   Do you need help with housework?  No   Do you need help with laundry? No   Do you need help taking your medications? No   Do you need help managing money? No   Do you ever drive or ride in a car without wearing a seat belt? No   Have you felt unusual stress, anger or loneliness in the last month? Yes   Who do you live with? Child   If you need help, do you have trouble finding someone available to you? No   Have you been bothered in the last four weeks by sexual problems? No   Do you have difficulty concentrating, remembering or making decisions? Yes         Does the patient have evidence of cognitive impairment? Yes    Asprin use counseling:Taking ASA appropriately as indicated    Age-appropriate Screening Schedule:  Refer to the list below for future screening recommendations based on patient's age, sex and/or medical conditions. Orders for these recommended tests are listed in the plan section. The patient has been provided with a written plan.    Health Maintenance   Topic Date Due   • TDAP/TD VACCINES (2 - Td or Tdap) 07/01/2021   • DXA SCAN  01/13/2022 (Originally 12/1/2018)   • ZOSTER VACCINE (2 of 2) 01/13/2022 (Originally 4/23/2018)   • INFLUENZA VACCINE  01/13/2023 (Originally 8/1/2021)   • MAMMOGRAM  07/10/2022   • LIPID PANEL   09/29/2022          The following portions of the patient's history were reviewed and updated as appropriate: allergies, current medications, past family history, past medical history, past social history, past surgical history and problem list.    Outpatient Medications Prior to Visit   Medication Sig Dispense Refill   • aspirin 81 MG tablet Take  by mouth.     • atorvastatin (LIPITOR) 10 MG tablet Take 1 tablet by mouth Every Night. 30 tablet 5   • clonazePAM (KlonoPIN) 1 MG tablet      • memantine (Namenda) 10 MG tablet Take 1/2 tab bid x 2 wks then 1 tab in am and 1/2 tab in pm x 2 weeks then 1 tablet twice a day and continue 180 tablet 1   • NIFEdipine CC (ADALAT CC) 60 MG 24 hr tablet Take 1 tablet by mouth once daily for blood pressure 90 tablet 0   • Apoaequorin (PREVAGEN PO) Take  by mouth.       No facility-administered medications prior to visit.       Patient Active Problem List   Diagnosis   • Atopic rhinitis   • Diverticulitis of intestine   • Gastroesophageal reflux disease   • Essential hypertension   • REM sleep behavior disorder   • Pure hypercholesterolemia   • Arthritis   • REM sleep behavior disorder   • Esophageal reflux   • Essential hypertension   • Intrinsic asthma without status asthmaticus without complication   • Mild cognitive impairment with memory loss       Advanced Care Planning:  ACP discussion was declined by the patient. Patient does not have an advance directive, declines further assistance.    Review of Systems   Constitutional: Negative for activity change, appetite change and fatigue.   HENT: Negative for congestion.    Respiratory: Negative for cough and shortness of breath.    Cardiovascular: Negative for chest pain and leg swelling.   Gastrointestinal: Negative for abdominal pain.   Neurological: Negative for dizziness, weakness and confusion.   Psychiatric/Behavioral: Negative for behavioral problems and decreased concentration.       Compared to one year ago, the patient  "feels her physical health is worse.  Compared to one year ago, the patient feels her mental health is the same.    Reviewed chart for potential of high risk medication in the elderly: yes  Reviewed chart for potential of harmful drug interactions in the elderly:yes    Objective         Vitals:    01/13/22 1401   BP: 118/60   Pulse: 85   Temp: 97.8 °F (36.6 °C)   TempSrc: Temporal   SpO2: 98%   Weight: 70 kg (154 lb 6.4 oz)   Height: 152.4 cm (60\")   PainSc: 0-No pain       Body mass index is 30.15 kg/m².  Discussed the patient's BMI with her. The BMI is above average; BMI management plan is completed.    Physical Exam  Vitals and nursing note reviewed.   HENT:      Head: Normocephalic.   Eyes:      Pupils: Pupils are equal, round, and reactive to light.   Cardiovascular:      Rate and Rhythm: Normal rate and regular rhythm.      Pulses: Normal pulses.      Heart sounds: Normal heart sounds. No murmur heard.      Pulmonary:      Effort: Pulmonary effort is normal.      Breath sounds: Normal breath sounds. No wheezing, rhonchi or rales.   Skin:     General: Skin is warm and dry.      Capillary Refill: Capillary refill takes less than 2 seconds.   Neurological:      General: No focal deficit present.      Mental Status: She is alert and oriented to person, place, and time.      Gait: Gait is intact.   Psychiatric:         Attention and Perception: Attention normal.         Mood and Affect: Mood normal.         Behavior: Behavior normal.         Thought Content: Thought content normal.         Judgment: Judgment normal.               Assessment/Plan   Medicare Risks and Personalized Health Plan  CMS Preventative Services Quick Reference  Advance Directive Discussion  Breast Cancer/Mammogram Screening  Cardiovascular risk  Dementia/Memory   Immunizations Discussed/Encouraged (specific immunizations; Influenza, Pneumococcal 23 and Shingrix )  Obesity/Overweight   Osteoporosis Risk    The above risks/problems have been " discussed with the patient.  Pertinent information has been shared with the patient in the After Visit Summary.  Follow up plans and orders are seen below in the Assessment/Plan Section.    Diagnoses and all orders for this visit:    1. Medicare annual wellness visit, subsequent (Primary)    2. Mixed hyperlipidemia  -     CBC w AUTO Differential; Future  -     Comprehensive Metabolic Panel  -     Lipid Panel    3. Memory loss, short term    4. Essential hypertension    5. History of iron deficiency  -     Iron Profile  -     Ferritin; Future    Declines mammogram  Declines DEXA  Declines advanced directive assistance  Colonoscopy due in 2024  Declines vaccines  Update labs    Follow Up:  Return in about 6 months (around 7/13/2022) for hyperlipidemia.     An After Visit Summary and PPPS were given to the patient.

## 2022-01-14 DIAGNOSIS — R79.89 ELEVATED FERRITIN: Primary | ICD-10-CM

## 2022-01-14 LAB
ALBUMIN SERPL-MCNC: 4.2 G/DL (ref 3.5–5.2)
ALBUMIN/GLOB SERPL: 1.3 G/DL
ALP SERPL-CCNC: 59 U/L (ref 39–117)
ALT SERPL W P-5'-P-CCNC: 16 U/L (ref 1–33)
ANION GAP SERPL CALCULATED.3IONS-SCNC: 10 MMOL/L (ref 5–15)
AST SERPL-CCNC: 22 U/L (ref 1–32)
BILIRUB SERPL-MCNC: 0.5 MG/DL (ref 0–1.2)
BUN SERPL-MCNC: 9 MG/DL (ref 8–23)
BUN/CREAT SERPL: 12.5 (ref 7–25)
CALCIUM SPEC-SCNC: 9.6 MG/DL (ref 8.6–10.5)
CHLORIDE SERPL-SCNC: 106 MMOL/L (ref 98–107)
CHOLEST SERPL-MCNC: 176 MG/DL (ref 0–200)
CO2 SERPL-SCNC: 27 MMOL/L (ref 22–29)
CREAT SERPL-MCNC: 0.72 MG/DL (ref 0.57–1)
FERRITIN SERPL-MCNC: 771 NG/ML (ref 13–150)
GFR SERPL CREATININE-BSD FRML MDRD: 96 ML/MIN/1.73
GLOBULIN UR ELPH-MCNC: 3.3 GM/DL
GLUCOSE SERPL-MCNC: 92 MG/DL (ref 65–99)
HDLC SERPL-MCNC: 84 MG/DL (ref 40–60)
IRON 24H UR-MRATE: 114 MCG/DL (ref 37–145)
IRON SATN MFR SERPL: 38 % (ref 20–50)
LDLC SERPL CALC-MCNC: 80 MG/DL (ref 0–100)
LDLC/HDLC SERPL: 0.94 {RATIO}
POTASSIUM SERPL-SCNC: 3.7 MMOL/L (ref 3.5–5.2)
PROT SERPL-MCNC: 7.5 G/DL (ref 6–8.5)
SODIUM SERPL-SCNC: 143 MMOL/L (ref 136–145)
TIBC SERPL-MCNC: 302 MCG/DL (ref 298–536)
TRANSFERRIN SERPL-MCNC: 203 MG/DL (ref 200–360)
TRIGL SERPL-MCNC: 64 MG/DL (ref 0–150)
VLDLC SERPL-MCNC: 12 MG/DL (ref 5–40)

## 2022-01-18 ENCOUNTER — TELEPHONE (OUTPATIENT)
Dept: PHYSICAL THERAPY | Facility: CLINIC | Age: 72
End: 2022-01-18

## 2022-01-19 ENCOUNTER — TREATMENT (OUTPATIENT)
Dept: PHYSICAL THERAPY | Facility: CLINIC | Age: 72
End: 2022-01-19

## 2022-01-19 DIAGNOSIS — R26.89 BALANCE PROBLEM: Primary | ICD-10-CM

## 2022-01-19 DIAGNOSIS — R41.841 COGNITIVE COMMUNICATION DEFICIT: Primary | ICD-10-CM

## 2022-01-19 PROCEDURE — 92507 TX SP LANG VOICE COMM INDIV: CPT | Performed by: SPEECH-LANGUAGE PATHOLOGIST

## 2022-01-19 PROCEDURE — 97110 THERAPEUTIC EXERCISES: CPT | Performed by: PHYSICAL THERAPIST

## 2022-01-19 PROCEDURE — 97112 NEUROMUSCULAR REEDUCATION: CPT | Performed by: PHYSICAL THERAPIST

## 2022-01-19 NOTE — PROGRESS NOTES
"Outpatient Speech Language Pathology   Adult Speech Language Cognitive Treatment Note       Patient Name: Carolyn Snowden  : 1950  MRN: [id number]  Today's Date: 2022         Visit Date: 2022   Patient Active Problem List   Diagnosis   • Atopic rhinitis   • Diverticulitis of intestine   • Gastroesophageal reflux disease   • Essential hypertension   • REM sleep behavior disorder   • Pure hypercholesterolemia   • Arthritis   • REM sleep behavior disorder   • Esophageal reflux   • Essential hypertension   • Intrinsic asthma without status asthmaticus without complication   • Mild cognitive impairment with memory loss          Visit Dx:    ICD-10-CM ICD-9-CM   1. Cognitive communication deficit  R41.841 799.52   Pain: 0   Subjective:\"I have been lazy\"   LTGs     Pt and family will implement compensatory strategies to maximize patient’s memory function so patient can continue to participate in daily activities  at 90% with no cues   -modeled/targeted IM/compensatory strategies: 80-85%  Pt will be able to remember information needed to participate in avocational activities  at 90% with no cues  -targeted functional tasks: 80-85%  STGs  Pt will utilize word finding strategies at 90% with no cues   -SFA visual aid modeled/targeted. 85%  Pt’s memory skills will be enhanced as reported by patient by utilizing internal memory strategies to recall up to 3 pieces of information after a 5 minute delay  Grocery list: 6/6  5 min delay   Name recall: 3/4 after 5 min delay   Pt’s memory skills will be enhanced as reported by patient using external memory aides  at 90% with no cues  -note takin%  Pt reports using Karly at home to set alarms and timers and get oriented to the day/time, getting to appointments, etc    Pt will demonstrate improved ability to recall information by listening/reading information and answering  Questions  at 90% with no cues  -article recall: 85%   -5 min podcast: " 90%  certification: 11/30/21-2/28/22     OP SLP Assessment/Plan - 01/19/22 0900        SLP Assessment    Functional Problems Speech Language- Adult/Cognition  -RB    Impact on Function: Adult Speech Language/Cognition Difficulty communicating wants, needs, and ideas; Difficulty communicating in a medical emergency; Restrictions in personal and social life; Poor attention to task; Trouble learning or remembering new information  -RB    Clinical Impression: Speech Language-Adult/Congnition Moderate-Severe:; Cognitive Communication Impairment  -RB    Functional Problems Comment cog/comm deficits impact participation and communication  -RB    Clinical Impression Comments receptive to HEP and strategies, using aids at home  -RB    Please refer to paper survey for additional self-reported information Yes  -RB    Please refer to items scanned into chart for additional diagnostic informaiton and handouts as provided by clinician Yes  -RB    SLP Diagnosis cog/comm  -RB    Prognosis Good (comment)  -RB    Patient/caregiver participated in establishment of treatment plan and goals Yes  -RB    Patient would benefit from skilled therapy intervention Yes  -RB       SLP Plan    Frequency 1x/weekly  -RB    Duration 4 weeks  -RB    Planned CPT's? SLP INDIVIDUAL SPEECH THERAPY: 81653  -RB    Expected Duration of Therapy Session (SLP Eval) 45  -RB    Plan Comments continue POC  -RB          User Key  (r) = Recorded By, (t) = Taken By, (c) = Cosigned By    Initials Name Provider Type    Jie Kumar SLP Speech and Language Pathologist                   OP SLP Education     Row Name 01/19/22 1000       Education    Barriers to Learning No barriers identified  -RB    Education Provided Patient demonstrated recommended strategies; Family/caregivers demonstrated recommended strategies; Patient requires further education on strategies, risks; Family/caregivers require further education on strategies, risks  -RB    Assessed Learning  readiness; Learning preferences; Learning motivation; Learning needs  -RB    Learning Motivation Strong  -RB    Learning Method Explanation; Demonstration; Teach back; Written materials  -RB    Teaching Response Verbalized understanding; Demonstrated understanding; Reinforcement needed  -RB    Education Comments HEP: IM practice  -RB          User Key  (r) = Recorded By, (t) = Taken By, (c) = Cosigned By    Initials Name Effective Dates    Jie Kumar SLP 06/16/21 -                         Jie Ascencio MA CCC-ADRIANA, [facility]  1/19/2022

## 2022-01-19 NOTE — PROGRESS NOTES
"Physical Therapy Daily Treatment Note  Visit: 3  Date of Initial Visit: Type: THERAPY  Noted: 2022    Carolyn Snowden reports: \"I've been relaxing at home.\"    Subjective Evaluation    Pain  No pain reported           Objective   See Exercise, Manual, and Modality Logs for complete treatment.    Exercise 1  Exercise Name 1: NuSTep B UE/LEs  Equipment/Resistance 1: level 7  Time: 8 min  Exercise 2  Exercise Name 2: Quad opposite UE/LE with 3 sec hold  Equipment/Resistance 2: CGA  Sets/Reps 2: 5  Exercise 3  Exercise Name 3: St balance on trampoline with reg TIM and B staggered with EC and B cervical rot/flex/ext; B LE marching without UE A; B LE jumping up/down; B ski jump; B hip ab/adduction jump without UE A  Equipment/Resistance 3: trampoline; min A; LOB 30% of the time  Sets/Reps 3: 10  Exercise 4  Exercise Name 4: Fwd, B sidestepping along balance beam; hold staggered with LE behind tapping the top of cone in front; B full turns standing on beam; standing sideways on beam with alternating tapping cone in front  Equipment/Resistance 4: balance beam; min/mod A;  Sets/Reps 4: 10       PT Neuro          Assessment & Plan     Assessment    Assessment details: Pt requires min/mod A with standing dynamic balance activities with LOB primarily with narrow TIM,EC and on uneven surfaces. Pt has LOB up to 30% of the time. Pt to benefit from skilled PT services to meet goals and improve overall functional mobility.  Pt performed all activities in socks secondary to patient wearing a boot with a high heal and had pt remove.      Plan  Plan details: Pt to benefit from skilled PT services to improve gait, balance, strength, and overall functional mobility.          Manual Therapy:     0     mins  46379;  Therapeutic Exercise:     15     mins  30725;     Neuromuscular Nuris:     30    mins  02462;    Therapeutic Activity:      0     mins  00914;     Gait Trainin     mins  18938;     Ultrasound:      0     mins  " 79571;    Electrical Stimulation:     0     mins  39733 ( );  Dry Needling      0     mins self-pay  Traction      0     mins 21916  Canalith Repositioning    0     mins 70875      Timed Treatment:   45   mins   Total Treatment:     45   mins    Chelly Goel, PT  [place] License #: [id number]    Physical Therapist

## 2022-01-26 ENCOUNTER — TREATMENT (OUTPATIENT)
Dept: PHYSICAL THERAPY | Facility: CLINIC | Age: 72
End: 2022-01-26

## 2022-01-26 DIAGNOSIS — R26.89 BALANCE PROBLEM: Primary | ICD-10-CM

## 2022-01-26 DIAGNOSIS — R41.841 COGNITIVE COMMUNICATION DEFICIT: Primary | ICD-10-CM

## 2022-01-26 PROCEDURE — 97112 NEUROMUSCULAR REEDUCATION: CPT | Performed by: PHYSICAL THERAPIST

## 2022-01-26 PROCEDURE — 97110 THERAPEUTIC EXERCISES: CPT | Performed by: PHYSICAL THERAPIST

## 2022-01-26 PROCEDURE — 92507 TX SP LANG VOICE COMM INDIV: CPT | Performed by: SPEECH-LANGUAGE PATHOLOGIST

## 2022-01-26 NOTE — PROGRESS NOTES
"Physical Therapy Daily Treatment Note  Visit: 4  Date of Initial Visit: Type: THERAPY  Noted: 2022    Carolyn Snowden reports: \"My balance has been alright.\"    Subjective Evaluation    Pain  No pain reported         Objective   See Exercise, Manual, and Modality Logs for complete treatment.    Exercise 1  Exercise Name 1: NuSTep B UE/LEs  Equipment/Resistance 1: level 7  Time: 8 min  Exercise 2  Exercise Name 2: fwd, B sidestepping with one foot in every square, every other square without UE A; straddle stepping fwd along ladder; stepping fwd/bwd along ladder; holding staggered with one foot in a square and toss/catch ball and then repeat length of ladder with rotational bounce/catch ball  Equipment/Resistance 2: agility ladder; min A; LOB 30% of the time  Sets/Reps 2: 65' x 2 of each  Exercise 3  Exercise Name 3: St balance on rocker board R/L and ant/post with B cervical rot/flex/ext; mini-squats  Equipment/Resistance 3: rocker board; min/mod A  Sets/Reps 3: 10                PT Neuro          Assessment & Plan     Assessment    Assessment details: Pt requires min/mod A with standing dynamic balance activities with LOB up to 30% of the time.  Pt requires extra time to perform agility ladder activities for motor planning.  Pt has increased LOB with narrow TIM and distraction.  Pt to benefit from skilled PT services to meet goals and improve overall functional mobility.    Plan  Plan details: Pt to benefit from skilled PT services to improve gait, balance, strength, and overall functional mobility.          Manual Therapy:     0     mins  91885;  Therapeutic Exercise:     10     mins  17101;     Neuromuscular Nuris:     35    mins  05214;    Therapeutic Activity:      0     mins  88509;     Gait Trainin     mins  61146;     Ultrasound:      0     mins  46131;    Electrical Stimulation:     0     mins  89533 ( );  Dry Needling      0     mins self-pay  Traction      0     mins 25268  Conrado " Repositioning    0     mins 52777      Timed Treatment:   45   mins   Total Treatment:     45   mins    Chelly Goel, PT  [place] License #: [id number]    Physical Therapist

## 2022-01-26 NOTE — PROGRESS NOTES
"Outpatient Speech Language Pathology   Adult Speech Language Cognitive Treatment Note       Patient Name: Carolyn Snowden  : 1950  MRN: [id number]  Today's Date: 2022         Visit Date: 2022   Patient Active Problem List   Diagnosis   • Atopic rhinitis   • Diverticulitis of intestine   • Gastroesophageal reflux disease   • Essential hypertension   • REM sleep behavior disorder   • Pure hypercholesterolemia   • Arthritis   • REM sleep behavior disorder   • Esophageal reflux   • Essential hypertension   • Intrinsic asthma without status asthmaticus without complication   • Mild cognitive impairment with memory loss          Visit Dx:    ICD-10-CM ICD-9-CM   1. Cognitive communication deficit  R41.841 799.52   Pain: 0   Subjective:\"lazy\" Patient reported receiving bad news about a close friend.   LTGs     Pt and family will implement compensatory strategies to maximize patient’s memory function so patient can continue to participate in daily activities  at 90% with no cues   -modeled/targeted IM/compensatory strategies: 80-85%  Pt will be able to remember information needed to participate in avocational activities  at 90% with no cues  -targeted functional tasks: 80-85%  STGs  Pt will utilize word finding strategies at 90% with no cues   -SFA visual aid modeled/targeted. 85%  Pt’s memory skills will be enhanced as reported by patient by utilizing internal memory strategies to recall up to 3 pieces of information after a 5 minute delay  Grocery list: 3/3  5 min delay   Name recall: 3/4 after 5 min delay   Pt’s memory skills will be enhanced as reported by patient using external memory aides  at 90% with no cues  -note takin%   -rehearsal strategy for upcoming appointments.   Pt will demonstrate improved ability to recall information by listening/reading information and answering  Questions  at 90% with no cues  -article recall: 85%   -6 min podcast: 90%  certification: 21-22     OP " SLP Assessment/Plan - 01/26/22 1200        SLP Assessment    Functional Problems Speech Language- Adult/Cognition (P)   -KR    Impact on Function: Adult Speech Language/Cognition Difficulty communicating wants, needs, and ideas; Difficulty communicating in a medical emergency; Restrictions in personal and social life; Poor attention to task; Trouble learning or remembering new information (P)   -KR    Clinical Impression: Speech Language-Adult/Congnition Moderate-Severe:; Cognitive Communication Impairment (P)   -KR    Functional Problems Comment cog/comm deficits impact communication and participation (P)   -KR    Clinical Impression Comments utilizes strategies at home (P)   -KR    Please refer to paper survey for additional self-reported information Yes (P)   -KR    Please refer to items scanned into chart for additional diagnostic informaiton and handouts as provided by clinician Yes (P)   -KR    SLP Diagnosis cog/comm (P)   -KR    Prognosis Good (comment) (P)   -KR    Patient/caregiver participated in establishment of treatment plan and goals Yes (P)   -KR    Patient would benefit from skilled therapy intervention Yes (P)   -KR       SLP Plan    Frequency 1x/weekly (P)   -KR    Duration 3 weeks (P)   -KR    Planned CPT's? SLP INDIVIDUAL SPEECH THERAPY: 08987 (P)   -KR    Expected Duration of Therapy Session (SLP Eval) 45 (P)   -KR    Plan Comments continue POC (P)   -KR          User Key  (r) = Recorded By, (t) = Taken By, (c) = Cosigned By    Initials Name Provider Type    Estefani Ascencio SLP Student SLP Student                   OP SLP Education     Row Name 01/26/22 1200       Education    Barriers to Learning No barriers identified (P)   -KR    Education Provided Patient demonstrated recommended strategies; Family/caregivers demonstrated recommended strategies; Patient requires further education on strategies, risks; Family/caregivers require further education on strategies, risks (P)   -KR    Assessed  Learning needs; Learning motivation; Learning preferences; Learning readiness (P)   -KR    Learning Motivation Strong (P)   -KR    Learning Method Explanation; Demonstration; Teach back; Written materials (P)   -KR    Teaching Response Verbalized understanding; Demonstrated understanding; Reinforcement needed (P)   -KR    Education Comments HEP rehearsal strategy handout (P)   -KR          User Key  (r) = Recorded By, (t) = Taken By, (c) = Cosigned By    Initials Name Effective Dates    Estefani Ascencio, SLP Student 12/01/21 -                                                ADRIANA Bishop Student, provided treatment under my direct supervision     Jie Ascencio MA CCC-SLP, CBIS  1/26/2022

## 2022-02-01 ENCOUNTER — TREATMENT (OUTPATIENT)
Dept: PHYSICAL THERAPY | Facility: CLINIC | Age: 72
End: 2022-02-01

## 2022-02-01 DIAGNOSIS — R41.841 COGNITIVE COMMUNICATION DEFICIT: Primary | ICD-10-CM

## 2022-02-01 DIAGNOSIS — R26.89 BALANCE PROBLEM: Primary | ICD-10-CM

## 2022-02-01 PROCEDURE — 92507 TX SP LANG VOICE COMM INDIV: CPT | Performed by: SPEECH-LANGUAGE PATHOLOGIST

## 2022-02-01 PROCEDURE — 97110 THERAPEUTIC EXERCISES: CPT | Performed by: PHYSICAL THERAPIST

## 2022-02-01 PROCEDURE — 97116 GAIT TRAINING THERAPY: CPT | Performed by: PHYSICAL THERAPIST

## 2022-02-01 PROCEDURE — 97112 NEUROMUSCULAR REEDUCATION: CPT | Performed by: PHYSICAL THERAPIST

## 2022-02-01 NOTE — PROGRESS NOTES
"PT Progress Note        Patient: Carolyn Snowden   : 1950  Diagnosis/ICD-10 Code:  Balance problem [R26.89]  Referring practitioner: LEW Wilson*  Date of Initial Visit: Type: THERAPY  Noted: 2022  Today's Date: 2022  Patient seen for 5 sessions      Subjective:   Carolyn Snowden reports: \"I can tell things have changed.\"  Subjective Questionnaire: n/a  Clinical Progress: improved  Home Program Compliance: Yes  Treatment has included: therapeutic exercise, neuromuscular re-education and gait training    Subjective Evaluation    Pain  No pain reported         Objective     PT Neuro         Assessment & Plan     Assessment  Impairments: abnormal gait, activity intolerance, impaired balance, impaired physical strength and lacks appropriate home exercise program  Functional Limitations: walking  Assessment details: Pt met all STG's with the exception of TUG. Pt met LTG for 10 meter walk today. Pt has increased LOB with EC and when on uneven surfaces.  Pt has LOB up to 40% of the time.  Pt to benefit from skilled PT services to improve gait, balance, strength and overall functional mobility.  Prognosis: good    Goals  Plan Goals: STG (4 visits)  1. Patient to improve VIVEROS balance score to >/= 48 /56 to decrease client's risk of falls. MET  2. Patient to perform TUG within 9.5 sec without LOB for improved functional mobility. ONGOING  3. Patient to ambulate 10 meters without AD within 12 sec without LOB for improved gait johnathan and functional mobility. MET  4. Patient to improve FGA score to >/= 22 /30 to decrease client's risk of falls. MET    LTG (8 visits)  1. Patient to improve VIVEROS balance score to >/= 53 /56 to decrease client's risk of falls. ONGOING  2. Patient to perform TUG within 8.5 sec without LOB for improved functional mobility. ONGOING  3. Patient to ambulate 10 meters without AD within 11 sec without LOB for improved gait johnathan and functional mobility. MET  4. Patient to " improve FGA score to >/= 26 /30 to decrease client's risk of falls. ONGOING  5. Patient to be I with HEP.    Plan  Therapy options: will be seen for skilled therapy services  Planned therapy interventions: gait training, home exercise program, neuromuscular re-education, strengthening, abdominal trunk stabilization and balance/weight-bearing training  Frequency: 1x week  Duration in visits: 3  Treatment plan discussed with: patient  Plan details: Patient will be seen 1x/wk x 3 visits with treatment to include strengthening, neuromuscular re-education, balance, gait and endurance training.         Visit Diagnoses:    ICD-10-CM ICD-9-CM   1. Balance problem  R26.89 781.99       Progress toward previous goals: Partially Met      Recommendations: Continue as planned  Timeframe: 3 weeks  Prognosis to achieve goals: good    PT Signature: Chelly Goel, PT  [place] License #: [id number]    Based upon review of the patient's progress and continued therapy plan, it is my medical opinion that Carolyn Snowden should continue physical therapy treatment at [facility] PHYSICAL THERAPY  [facility] [address] [facility] [id number] [phone].    Signature: __________________________________  Leola Andrews APRN    Timed:  Manual Therapy:    0     mins  78753;  Therapeutic Exercise:    15     mins  27051;     Neuromuscular Nuris:    15    mins  09217;    Therapeutic Activity:     0     mins  02912;     Gait Training:      15     mins  72578;     Ultrasound:     0     mins  13219;    Electrical Stimulation:    0     mins  41402 ( );    Untimed:  Electrical Stimulation:    0     mins  76634 ( );  Mechanical Traction:    0     mins  86792;     Timed Treatment:   45   mins   Total Treatment:     45   mins

## 2022-02-01 NOTE — PROGRESS NOTES
Outpatient Speech Language Pathology   Adult Speech Language Cognitive Treatment Note       Patient Name: Carolyn Snowden  : 1950  MRN: [id number]  Today's Date: 2022         Visit Date: 2022   Patient Active Problem List   Diagnosis   • Atopic rhinitis   • Diverticulitis of intestine   • Gastroesophageal reflux disease   • Essential hypertension   • REM sleep behavior disorder   • Pure hypercholesterolemia   • Arthritis   • REM sleep behavior disorder   • Esophageal reflux   • Essential hypertension   • Intrinsic asthma without status asthmaticus without complication   • Mild cognitive impairment with memory loss          Visit Dx:    ICD-10-CM ICD-9-CM   1. Cognitive communication deficit  R41.841 799.52   Pain: 0   Subjective: Patient reported a lot going on in her personal life.    LTGs     Pt and family will implement compensatory strategies to maximize patient’s memory function so patient can continue to participate in daily activities  at 90% with no cues   -modeled/targeted IM/compensatory strategies: 80-85%  Pt will be able to remember information needed to participate in avocational activities  at 90% with no cues  -targeted functional tasks: 80-85%  STGs  Pt will utilize word finding strategies at 90% with no cues   -SFA visual aid modeled/targeted. 85%  Pt’s memory skills will be enhanced as reported by patient by utilizing internal memory strategies to recall up to 3 pieces of information after a 5 minute delay  -grocery list: 2/3  5 min delay   -name recall: 4/4 after 5 min delay with cues  -to do list: 2/4 5 min delay   Pt’s memory skills will be enhanced as reported by patient using external memory aides  at 90% with no cues  -note takin%   Pt will demonstrate improved ability to recall information by listening/reading information and answering  Questions  at 90% with no cues  -article recall: 90%   -6 min podcast: 90%  -recipe- 90%  certification: 21-22     OP SLP  Assessment/Plan - 01/26/22 1200        SLP Assessment    Functional Problems Speech Language- Adult/Cognition (P)   -KR    Impact on Function: Adult Speech Language/Cognition Difficulty communicating wants, needs, and ideas; Difficulty communicating in a medical emergency; Restrictions in personal and social life; Poor attention to task; Trouble learning or remembering new information (P)   -KR    Clinical Impression: Speech Language-Adult/Congnition Moderate-Severe:; Cognitive Communication Impairment (P)   -KR    Functional Problems Comment cog/comm deficits impact communication and participation (P)   -KR    Clinical Impression Comments receptive to HEP and utilizes memory strategies (P)   -KR    Please refer to paper survey for additional self-reported information Yes (P)   -KR    Please refer to items scanned into chart for additional diagnostic informaiton and handouts as provided by clinician Yes (P)   -KR    SLP Diagnosis cog/comm (P)   -KR    Prognosis Good (comment) (P)   -KR    Patient/caregiver participated in establishment of treatment plan and goals Yes (P)   -KR    Patient would benefit from skilled therapy intervention Yes (P)   -KR       SLP Plan    Frequency 1x/weekly (P)   -KR    Duration 2 weeks (P)   -KR    Planned CPT's? SLP INDIVIDUAL SPEECH THERAPY: 30913 (P)   -KR    Expected Duration of Therapy Session (SLP Eval) 45 (P)   -KR    Plan Comments continue POC (P)   -KR          User Key  (r) = Recorded By, (t) = Taken By, (c) = Cosigned By    Initials Name Provider Type    Estefani Ascencio SLP Student SLP Student                   OP SLP Education     Row Name 01/26/22 1200       Education    Barriers to Learning No barriers identified (P)   -KR    Education Provided Patient demonstrated recommended strategies; Family/caregivers demonstrated recommended strategies; Patient requires further education on strategies, risks; Family/caregivers require further education on strategies, risks (P)    -KR    Assessed Learning needs; Learning motivation; Learning preferences; Learning readiness (P)   -KR    Learning Motivation Strong (P)   -KR    Learning Method Explanation; Demonstration; Teach back; Written materials (P)   -KR    Teaching Response Verbalized understanding; Demonstrated understanding; Reinforcement needed (P)   -KR    Education Comments HEP- grocery list- grouping memory strategy (P)   -KR          User Key  (r) = Recorded By, (t) = Taken By, (c) = Cosigned By    Initials Name Effective Dates    Estefani Ascencio, SLP Student 12/01/21 -                                                ADRIANA Bishop Student, provided treatment under my direct supervision     Jie Ascencio MA CCC-SLP, CBIS  2/1/2022

## 2022-02-07 DIAGNOSIS — E78.2 MIXED HYPERLIPIDEMIA: ICD-10-CM

## 2022-02-07 RX ORDER — ATORVASTATIN CALCIUM 10 MG/1
10 TABLET, FILM COATED ORAL NIGHTLY
Qty: 90 TABLET | Refills: 2 | Status: SHIPPED | OUTPATIENT
Start: 2022-02-07 | End: 2023-02-24

## 2022-02-09 ENCOUNTER — TREATMENT (OUTPATIENT)
Dept: PHYSICAL THERAPY | Facility: CLINIC | Age: 72
End: 2022-02-09

## 2022-02-09 DIAGNOSIS — R26.89 BALANCE PROBLEM: Primary | ICD-10-CM

## 2022-02-09 DIAGNOSIS — R41.841 COGNITIVE COMMUNICATION DEFICIT: Primary | ICD-10-CM

## 2022-02-09 PROCEDURE — 97116 GAIT TRAINING THERAPY: CPT | Performed by: PHYSICAL THERAPIST

## 2022-02-09 PROCEDURE — 97112 NEUROMUSCULAR REEDUCATION: CPT | Performed by: PHYSICAL THERAPIST

## 2022-02-09 PROCEDURE — 92507 TX SP LANG VOICE COMM INDIV: CPT | Performed by: SPEECH-LANGUAGE PATHOLOGIST

## 2022-02-09 PROCEDURE — 97110 THERAPEUTIC EXERCISES: CPT | Performed by: PHYSICAL THERAPIST

## 2022-02-09 NOTE — PROGRESS NOTES
Outpatient Speech Language Pathology   Adult Speech Language Cognitive Treatment Note       Patient Name: Carolyn Snowden  : 1950  MRN: [id number]  Today's Date: 2022         Visit Date: 2022   Patient Active Problem List   Diagnosis   • Atopic rhinitis   • Diverticulitis of intestine   • Gastroesophageal reflux disease   • Essential hypertension   • REM sleep behavior disorder   • Pure hypercholesterolemia   • Arthritis   • REM sleep behavior disorder   • Esophageal reflux   • Essential hypertension   • Intrinsic asthma without status asthmaticus without complication   • Mild cognitive impairment with memory loss          Visit Dx:    ICD-10-CM ICD-9-CM   1. Cognitive communication deficit  R41.841 799.52   Pain: 0   Subjective: Patient reported more confidence with her memory.    LTGs     Pt and family will implement compensatory strategies to maximize patient’s memory function so patient can continue to participate in daily activities  at 90% with no cues   -modeled/targeted IM/compensatory strategies: 80-85%  Pt will be able to remember information needed to participate in avocational activities  at 90% with no cues  -targeted functional tasks: 80-85%  STGs  Pt will utilize word finding strategies at 90% with no cues   -SFA visual aid modeled/targeted. 85%  Pt’s memory skills will be enhanced as reported by patient by utilizing internal memory strategies to recall up to 3 pieces of information after a 5 minute delay  -grocery list: 5/5  5 min delay   -name recall: 3/4 after 5 min delay with cues  -to do list: 3/3 5 min delay   Pt’s memory skills will be enhanced as reported by patient using external memory aides  at 90% with no cues  -note takin%   Pt will demonstrate improved ability to recall information by listening/reading information and answering  Questions  at 90% with no cues  -article x2 recall: 90%   -5 min podcast: 85%    certification: 21-22     OP SLP  Assessment/Plan - 01/26/22 1200        SLP Assessment    Functional Problems Speech Language- Adult/Cognition (P)   -KR    Impact on Function: Adult Speech Language/Cognition Difficulty communicating wants, needs, and ideas; Difficulty communicating in a medical emergency; Restrictions in personal and social life; Poor attention to task; Trouble learning or remembering new information (P)   -KR    Clinical Impression: Speech Language-Adult/Congnition Moderate-Severe:; Cognitive Communication Impairment (P)   -KR    Functional Problems Comment cog/comm deficits impact communication and participation (P)   -KR    Clinical Impression Comments utilizes memory strategies in therapy (P)   -KR    Please refer to paper survey for additional self-reported information Yes (P)   -KR    Please refer to items scanned into chart for additional diagnostic informaiton and handouts as provided by clinician Yes (P)   -KR    SLP Diagnosis cog/comm (P)   -KR    Prognosis Good (comment) (P)   -KR    Patient/caregiver participated in establishment of treatment plan and goals Yes (P)   -KR    Patient would benefit from skilled therapy intervention Yes (P)   -KR       SLP Plan    Frequency 1x/weekly (P)   -KR    Duration 1 weeks (P)   -KR    Planned CPT's? SLP INDIVIDUAL SPEECH THERAPY: 82771 (P)   -KR    Expected Duration of Therapy Session (SLP Eval) 45 (P)   -KR    Plan Comments continue POC (P)   -KR          User Key  (r) = Recorded By, (t) = Taken By, (c) = Cosigned By    Initials Name Provider Type    Estefani Ascencio SLP Student SLP Student                   OP SLP Education     Row Name 01/26/22 1200       Education    Barriers to Learning No barriers identified (P)   -KR    Education Provided Patient demonstrated recommended strategies; Family/caregivers demonstrated recommended strategies; Patient requires further education on strategies, risks; Family/caregivers require further education on strategies, risks (P)   -KR     Assessed Learning needs; Learning motivation; Learning preferences; Learning readiness (P)   -KR    Learning Motivation Strong (P)   -KR    Learning Method Explanation; Demonstration; Teach back; Written materials (P)   -KR    Teaching Response Verbalized understanding; Demonstrated understanding; Reinforcement needed (P)   -KR    Education Comments HEP- article for note taking practice (P)   -KR          User Key  (r) = Recorded By, (t) = Taken By, (c) = Cosigned By    Initials Name Effective Dates    Estefani Ascencio, SLP Student 12/01/21 -                                                ADRIANA Bishop Student, provided treatment under my direct supervision     Jie Ascencio MA CCC-SLP, CBIS  2/9/2022

## 2022-02-09 NOTE — PROGRESS NOTES
"Physical Therapy Daily Treatment Note  Visit: 6  Date of Initial Visit: Type: THERAPY  Noted: 1/5/2022    Carolyn Snowden reports: \"I had a good speech session today. My balance is alright.\"    Subjective Evaluation    Pain  No pain reported          Objective   See Exercise, Manual, and Modality Logs for complete treatment.    Exercise 1  Exercise Name 1: NuSTep B UE/LEs  Equipment/Resistance 1: level 7  Time: 8 min  Exercise 2  Exercise Name 2: St balance with fwd stepping up onto/off rounded side of BOSU without UEA; st balance on both sides of BOSU with mini-squats and then B cervical rot/flex/ext  Equipment/Resistance 2: BOSU; min/mod A  Sets/Reps 2: 10  Exercise 3  Exercise Name 3: Fwd and B sidestepping along foam balance beam; hold staggered on beam with LE behind stepping fwd/bwd without UE A; B full turns standing on beam; standing sideways on beam with alternating tapping cone in front  Equipment/Resistance 3: blue foam balance beam; min/mod A  Sets/Reps 3: 10  Exercise 4  Exercise Name 4: Ambulation with toss/catch, bounce/catch, walking with side toss/catch with therapist, retroambulation with toss/catch, short B LE leaps, ambulation fwd with EC  Equipment/Resistance 4: ball; CGA  Sets/Reps 4: 75' x 2 of each       PT Neuro          Assessment & Plan     Assessment    Assessment details: Pt requires min/mod A with standing dynamic balance activities with LOB up to 40% of the time.  Pt demonstrates veering with fwd ambulation with EC.  Pt demonstrates improved balance on uneven surfaces and with more narrow TIM.  Pt to benefit from skilled PT services to meet goals and improve overall functional mobility.    Plan  Plan details: Pt to benefit from skilled PT services to improve gait, balance, strength, and overall functional mobility.          Manual Therapy:     0     mins  27543;  Therapeutic Exercise:     10     mins  20822;     Neuromuscular Nuris:     20    mins  55647;    Therapeutic Activity:      " 0     mins  34672;     Gait Training:       15     mins  53217;     Ultrasound:      0     mins  18632;    Electrical Stimulation:     0     mins  87990 ( );  Dry Needling      0     mins self-pay  Traction      0     mins 93671  [place]alith Repositioning    0     mins 14337      Timed Treatment:   45   mins   Total Treatment:     45   mins    Chelly Goel, PT  [place] License #: [id number]    Physical Therapist

## 2022-03-02 ENCOUNTER — TREATMENT (OUTPATIENT)
Dept: PHYSICAL THERAPY | Facility: CLINIC | Age: 72
End: 2022-03-02

## 2022-03-02 DIAGNOSIS — R26.89 BALANCE PROBLEM: Primary | ICD-10-CM

## 2022-03-02 DIAGNOSIS — R41.841 COGNITIVE COMMUNICATION DEFICIT: Primary | ICD-10-CM

## 2022-03-02 PROCEDURE — 97112 NEUROMUSCULAR REEDUCATION: CPT | Performed by: PHYSICAL THERAPIST

## 2022-03-02 PROCEDURE — 92507 TX SP LANG VOICE COMM INDIV: CPT | Performed by: SPEECH-LANGUAGE PATHOLOGIST

## 2022-03-02 PROCEDURE — 97110 THERAPEUTIC EXERCISES: CPT | Performed by: PHYSICAL THERAPIST

## 2022-03-02 PROCEDURE — 97116 GAIT TRAINING THERAPY: CPT | Performed by: PHYSICAL THERAPIST

## 2022-03-02 NOTE — PROGRESS NOTES
"PT Progress Note        Patient: Carolyn Snowden   : 1950  Diagnosis/ICD-10 Code:  Balance problem [R26.89]  Referring practitioner: LEW Wilson*  Date of Initial Visit: Type: THERAPY  Noted: 2022  Today's Date: 3/2/2022  Patient seen for 7 sessions      Subjective:   Carolyn Snowden reports: \"I've been visiting my daughter in [place], [place].  She's been ill and I had trouble doing her stairs at her home.  I feel off balance.\"  Subjective Questionnaire: n/a  Clinical Progress: improved  Home Program Compliance: Yes  Treatment has included: therapeutic exercise, neuromuscular re-education and gait training    Subjective Evaluation    Pain  No pain reported         Objective     PT Neuro    Exercise 1  Exercise Name 1: Re-assessment completed, see for details  Exercise 2  Exercise Name 2: St balance on rocker board R/L and ant/post with B cerivcal rot/flex/ext; mini-squats  Equipment/Resistance 2: rocker board; min A  Sets/Reps 2: 10  Exercise 3  Exercise Name 3: Stair climbing without HR's with recip pattern  Equipment/Resistance 3: 12 steps x 3; mod A  Exercise 4  Exercise Name 4: One foot on bottom step and other stepping to the second step without UE A  Equipment/Resistance 4: 2 steps; min/mod A  Sets/Reps 4: 10  Exercise 5  Exercise Name 5: Ambulation fwd with EC, B cevical rot/flex/ext; increased johnathan with EC and EO; retroambulation with emphasis on increased step length  Equipment/Resistance 5: CGA; min A  Sets/Reps 5: 500'  Exercise 6  Exercise Name 6: Fwd, B sidestepping along balance beam; holding tandem with LE behind stepping fwd/bwd; standing sideways with alternating tapping cone in front  Equipment/Resistance 6: balance beam; min/mod A  Sets/Reps 6: 10         Functional Testing  Functional Tests Options: 10 Meter Walk, Goldberg Balance, Timed Up and Go (TUG), FGA (Functional Gait Assessment)         Assessment & Plan     Assessment  Impairments: abnormal gait, activity " intolerance, impaired balance, impaired physical strength and lacks appropriate home exercise program  Functional Limitations: walking  Assessment details: Pt has a functional decline with imbalance for FGA but pt did have improved VIVEROS balance score.  Pt met STG for VIVEROS, TUG and 10 meter today but pt reports feeling more off balance.  Pt has increased stress with her dtr being ill and that could be contributing to patients increased imbalance.  Pt requires min A with standing dynamic balance activities.  Pt requires up to mod A with stair climbing with recip pattern without UE A.   Pt to benefit from skilled PT services to improve gait, balance, strength and overall functional mobility.  Prognosis: good    Goals  Plan Goals: STG (4 visits)  1. Patient to improve VIVEROS balance score to >/= 48 /56 to decrease client's risk of falls. MET  2. Patient to perform TUG within 9.5 sec without LOB for improved functional mobility. MET  3. Patient to ambulate 10 meters without AD within 12 sec without LOB for improved gait johnathan and functional mobility. MET  4. Patient to improve FGA score to >/= 22 /30 to decrease client's risk of falls. ONGOING    LTG (8 visits)  1. Patient to improve VIVEROS balance score to >/= 53 /56 to decrease client's risk of falls. ONGOING  2. Patient to perform TUG within 8.5 sec without LOB for improved functional mobility. ONGOING  3. Patient to ambulate 10 meters without AD within 11 sec without LOB for improved gait johnathan and functional mobility. ONGOING  4. Patient to improve FGA score to >/= 26 /30 to decrease client's risk of falls. ONGOING  5. Patient to be I with HEP. ONGOING    Plan  Therapy options: will be seen for skilled therapy services  Planned therapy interventions: gait training, home exercise program, neuromuscular re-education, strengthening, abdominal trunk stabilization and balance/weight-bearing training  Frequency: 1x week  Duration in visits: 4  Treatment plan discussed  with: patient  Plan details: Patient will be seen 1x/wk x 4 additional visits with treatment to include strengthening, neuromuscular re-education, balance, gait and endurance training.         Visit Diagnoses:    ICD-10-CM ICD-9-CM   1. Balance problem  R26.89 781.99       Progress toward previous goals: Partially Met      Recommendations: Continue as planned  Timeframe: 1 month  Prognosis to achieve goals: good    PT Signature: Chelly Goel, PT  [place] License #: [id number]    Based upon review of the patient's progress and continued therapy plan, it is my medical opinion that Carolyn Snowden should continue physical therapy treatment at [facility]  [address] [facility] [address] [phone].    Signature: __________________________________  Leola Andrews APRN    Timed:  Manual Therapy:    0     mins  09927;  Therapeutic Exercise:    10     mins  28056;     Neuromuscular Nuris:    20    mins  38456;    Therapeutic Activity:     0     mins  91581;     Gait Training:      15     mins  16574;     Ultrasound:     0     mins  20216;    Electrical Stimulation:    0     mins  52700 ( );    Untimed:  Electrical Stimulation:    0     mins  12920 ( );  Mechanical Traction:    0     mins  28760;     Timed Treatment:   45   mins   Total Treatment:     45   mins

## 2022-03-02 NOTE — PROGRESS NOTES
Outpatient Speech Language Pathology   Adult Speech Language Cognitive Treatment Note       Patient Name: Carolyn Snowden  : 1950  MRN: [id number]  Today's Date: 3/2/2022         Visit Date: 2022   Patient Active Problem List   Diagnosis   • Atopic rhinitis   • Diverticulitis of intestine   • Gastroesophageal reflux disease   • Essential hypertension   • REM sleep behavior disorder   • Pure hypercholesterolemia   • Arthritis   • REM sleep behavior disorder   • Esophageal reflux   • Essential hypertension   • Intrinsic asthma without status asthmaticus without complication   • Mild cognitive impairment with memory loss          Visit Dx:    ICD-10-CM ICD-9-CM   1. Cognitive communication deficit  R41.841 799.52   Pain: 0   Subjective: Patient reports having a difficult time. Her daughter was recently diagnosed with cancer.    LTGs  Pt and family will implement compensatory strategies to maximize patient’s memory function so patient can continue to participate in daily activities  at 90% with no cues   -modeled/targeted IM/compensatory strategies: 80-85%  Pt will be able to remember information needed to participate in avocational activities  at 90% with no cues  -targeted functional tasks: 80-85%  STGs  Pt will utilize word finding strategies at 90% with no cues   -SFA visual aid modeled/targeted. 85%  Pt’s memory skills will be enhanced as reported by patient by utilizing internal memory strategies to recall up to 3 pieces of information after a 5 minute delay  -grocery list: 5/5  5 min delay with cues  -name recall: 4/5 after 5 min delay   Pt’s memory skills will be enhanced as reported by patient using external memory aides  at 90% with no cues  -note takin%   Pt will demonstrate improved ability to recall information by listening/reading information and answering  Questions  at 90% with no cues  -article x2 recall: 90%   -6 min podcast: 85%    certification: 21-22     OP SLP  Assessment/Plan - 01/26/22 1200        SLP Assessment    Functional Problems Speech Language- Adult/Cognition (P)   -KR    Impact on Function: Adult Speech Language/Cognition Difficulty communicating wants, needs, and ideas; Difficulty communicating in a medical emergency; Restrictions in personal and social life; Poor attention to task; Trouble learning or remembering new information (P)   -KR    Clinical Impression: Speech Language-Adult/Congnition Moderate-Severe:; Cognitive Communication Impairment (P)   -KR    Functional Problems Comment cog/comm deficits impact communication and participation (P)   -KR    Clinical Impression Comments Pt missed sessions due to being out of town, receptive to strategies (P)   -KR    Please refer to paper survey for additional self-reported information Yes (P)   -KR    Please refer to items scanned into chart for additional diagnostic informaiton and handouts as provided by clinician Yes (P)   -KR    SLP Diagnosis cog/comm (P)   -KR    Prognosis Good (comment) (P)   -KR    Patient/caregiver participated in establishment of treatment plan and goals Yes (P)   -KR    Patient would benefit from skilled therapy intervention Yes (P)   -KR       SLP Plan    Frequency 1x/weekly (P)   -KR    Duration 1 week (P)   -KR    Planned CPT's? SLP INDIVIDUAL SPEECH THERAPY: 33708 (P)   -KR    Expected Duration of Therapy Session (SLP Eval) 45 (P)   -KR    Plan Comments continue POC (P)   -KR          User Key  (r) = Recorded By, (t) = Taken By, (c) = Cosigned By    Initials Name Provider Type    Estefani Ascencio SLP Student SLP Student                   OP SLP Education     Row Name 01/26/22 1200       Education    Barriers to Learning No barriers identified (P)   -KR    Education Provided Patient demonstrated recommended strategies; Family/caregivers demonstrated recommended strategies; Patient requires further education on strategies, risks; Family/caregivers require further education on  strategies, risks (P)   -KR    Assessed Learning needs; Learning motivation; Learning preferences; Learning readiness (P)   -KR    Learning Motivation Strong (P)   -KR    Learning Method Explanation; Demonstration; Teach back; Written materials (P)   -KR    Teaching Response Verbalized understanding; Demonstrated understanding; Reinforcement needed (P)   -KR    Education Comments HEP- names for association practice (P)   -KR          User Key  (r) = Recorded By, (t) = Taken By, (c) = Cosigned By    Initials Name Effective Dates    Estefani Ascencio, SLP Student 12/01/21 -                                                ADRIANA Bishop Student, provided treatment under my direct supervision     Jie Ascencio MA CCC-SLP, CBIS  3/2/2022

## 2022-03-09 ENCOUNTER — TREATMENT (OUTPATIENT)
Dept: PHYSICAL THERAPY | Facility: CLINIC | Age: 72
End: 2022-03-09

## 2022-03-09 DIAGNOSIS — R41.841 COGNITIVE COMMUNICATION DEFICIT: Primary | ICD-10-CM

## 2022-03-09 DIAGNOSIS — R26.89 BALANCE PROBLEM: Primary | ICD-10-CM

## 2022-03-09 PROCEDURE — 97110 THERAPEUTIC EXERCISES: CPT | Performed by: PHYSICAL THERAPIST

## 2022-03-09 PROCEDURE — 97112 NEUROMUSCULAR REEDUCATION: CPT | Performed by: PHYSICAL THERAPIST

## 2022-03-09 PROCEDURE — 92507 TX SP LANG VOICE COMM INDIV: CPT | Performed by: SPEECH-LANGUAGE PATHOLOGIST

## 2022-03-09 NOTE — PROGRESS NOTES
"Physical Therapy Daily Treatment Note  Visit: 8  Date of Initial Visit: Type: THERAPY  Noted: 2022    Carolyn Snowden reports: \"I don't know how my balance has been doing.\"    Subjective Evaluation    Pain  No pain reported           Objective   See Exercise, Manual, and Modality Logs for complete treatment.    Exercise 1  Exercise Name 1: NuStep B UE/LEs  Equipment/Resistance 1: level 7  Time: 8 min  Exercise 2  Exercise Name 2: DLP  Equipment/Resistance 2: total gym  Sets/Reps 2: 2  Time 2: 2 min  Exercise 3  Exercise Name 3: Stool pull  Time 3: 3 min  Exercise 4  Exercise Name 4: St balance on trampoline without UE A with B LE marching, B knee flexion, B full turns.  Jumping up/down, B ski jump, B hip ab/adduction jump. ST with reg TIM and B staggered standing with EC  Equipment/Resistance 4: trampoline; min/mod A  Sets/Reps 4: 10  Exercise 5  Exercise Name 5: Fwd walking along foam beam; holding staggered with LE behind stepping fwd/bwd without UE A  Equipment/Resistance 5: foam balance beam; min/mod A  Sets/Reps 5: 10       PT Neuro          Assessment & Plan     Assessment    Assessment details: Pt requires min/mod A with standing dynamic balance activities with LOB up to 40% of the time on uneven surfaces.  Pt had to perform exercises in socks today secondary to pt wearing heeled shoes.  Pt to benefit from skilled PT services to improve overall functional mobility.     Plan  Plan details: Pt to benefit from skilled PT services to improve gait, balance, strength, and overall functional mobility.          Manual Therapy:     0     mins  95347;  Therapeutic Exercise:     15     mins  59391;     Neuromuscular Nuris:     30    mins  74385;    Therapeutic Activity:      0     mins  24854;     Gait Trainin     mins  66364;     Ultrasound:      0     mins  14658;    Electrical Stimulation:     0     mins  95648 ( );  Dry Needling      0     mins self-pay  Traction      0     mins 70631  Conrado " Repositioning    0     mins 13383      Timed Treatment:   45   mins   Total Treatment:     45   mins    Chelly Goel, PT  [place] License #: [id number]    Physical Therapist

## 2022-03-09 NOTE — PROGRESS NOTES
"Outpatient Speech Language Pathology   Adult Speech Language Cognitive Treatment Note       Patient Name: Carolyn Snowden  : 1950  MRN: [id number]  Today's Date: 3/9/2022         Visit Date: 2022   Patient Active Problem List   Diagnosis   • Atopic rhinitis   • Diverticulitis of intestine   • Gastroesophageal reflux disease   • Essential hypertension   • REM sleep behavior disorder   • Pure hypercholesterolemia   • Arthritis   • REM sleep behavior disorder   • Esophageal reflux   • Essential hypertension   • Intrinsic asthma without status asthmaticus without complication   • Mild cognitive impairment with memory loss          Visit Dx:    ICD-10-CM ICD-9-CM   1. Cognitive communication deficit  R41.841 799.52   Pain: 0   Subjective: \"in a rush this morning\" Pt arrived 20 minutes late. Pt is going to call and let SLP know if she wants to continue treatment or discharge   LTGs  Pt and family will implement compensatory strategies to maximize patient’s memory function so patient can continue to participate in daily activities  at 90% with no cues   -modeled/targeted IM/compensatory strategies: 90% MET x1  Pt will be able to remember information needed to participate in avocational activities  at 90% with no cues  -targeted functional tasks: 90% MET x1  STGs  Pt will utilize word finding strategies at 90% with no cues   -SFA visual aid modeled/targeted. 90%  MET x1  Pt’s memory skills will be enhanced as reported by patient by utilizing internal memory strategies to recall up to 3 pieces of information after a 5 minute delay  -to do list: 5/5  5 min delay   -name recall: 4/4 after 5 min delay   MET x1  Pt’s memory skills will be enhanced as reported by patient using external memory aides  at 90% with no cues  -note takin%   MET x1  Pt will demonstrate improved ability to recall information by listening/reading information and answering  Questions  at 90% with no cues  -article x2 recall: 90% "     MET x1  certification: 11/30/21-2/28/22     OP SLP Assessment/Plan - 01/26/22 1200        SLP Assessment    Functional Problems Speech Language- Adult/Cognition (P)   -KR    Impact on Function: Adult Speech Language/Cognition Difficulty communicating wants, needs, and ideas; Difficulty communicating in a medical emergency; Restrictions in personal and social life; Poor attention to task; Trouble learning or remembering new information (P)   -KR    Clinical Impression: Speech Language-Adult/Congnition Moderate-Severe:; Cognitive Communication Impairment (P)   -KR    Functional Problems Comment cog/comm deficits impact communication and participation (P)   -KR    Clinical Impression Comments Pt missed sessions due to being out of town, receptive to strategies (P)   -KR    Please refer to paper survey for additional self-reported information Yes (P)   -KR    Please refer to items scanned into chart for additional diagnostic informaiton and handouts as provided by clinician Yes (P)   -KR    SLP Diagnosis cog/comm (P)   -KR    Prognosis Good (comment) (P)   -KR    Patient/caregiver participated in establishment of treatment plan and goals Yes (P)   -KR    Patient would benefit from skilled therapy intervention Yes (P)   -KR       SLP Plan    Frequency 1x/weekly (P)   -KR    Duration 1 week (P)   -KR    Planned CPT's? SLP INDIVIDUAL SPEECH THERAPY: 55776 (P)   -KR    Expected Duration of Therapy Session (SLP Eval) 45 (P)   -KR    Plan Comments continue POC (P)   -KR          User Key  (r) = Recorded By, (t) = Taken By, (c) = Cosigned By    Initials Name Provider Type    Estefani Ascencio SLP Student SLP Student                   OP SLP Education     Row Name 01/26/22 1200       Education    Barriers to Learning No barriers identified (P)   -KR    Education Provided Patient demonstrated recommended strategies; Family/caregivers demonstrated recommended strategies; Patient requires further education on strategies,  risks; Family/caregivers require further education on strategies, risks (P)   -KR    Assessed Learning needs; Learning motivation; Learning preferences; Learning readiness (P)   -KR    Learning Motivation Strong (P)   -KR    Learning Method Explanation; Demonstration; Teach back; Written materials (P)   -KR    Teaching Response Verbalized understanding; Demonstrated understanding; Reinforcement needed (P)   -KR    Education Comments HEP- article (P)   -KR          User Key  (r) = Recorded By, (t) = Taken By, (c) = Cosigned By    Initials Name Effective Dates    Estefani Ascencio, SLP Student 12/01/21 -                                                Estefani Rangel, SLP Student, provided treatment under my direct supervision     Jie Ascencio MA CCC-SLP, CBIS  3/9/2022

## 2022-03-10 ENCOUNTER — OFFICE VISIT (OUTPATIENT)
Dept: NEUROLOGY | Facility: CLINIC | Age: 72
End: 2022-03-10

## 2022-03-10 VITALS
BODY MASS INDEX: 29.83 KG/M2 | WEIGHT: 158 LBS | HEART RATE: 84 BPM | OXYGEN SATURATION: 97 % | HEIGHT: 61 IN | DIASTOLIC BLOOD PRESSURE: 68 MMHG | SYSTOLIC BLOOD PRESSURE: 130 MMHG

## 2022-03-10 DIAGNOSIS — F02.80 ALZHEIMER DISEASE: ICD-10-CM

## 2022-03-10 DIAGNOSIS — G30.9 ALZHEIMER DISEASE: ICD-10-CM

## 2022-03-10 PROCEDURE — 99214 OFFICE O/P EST MOD 30 MIN: CPT | Performed by: NURSE PRACTITIONER

## 2022-03-10 RX ORDER — DONEPEZIL HYDROCHLORIDE 10 MG/1
10 TABLET, FILM COATED ORAL NIGHTLY
Qty: 90 TABLET | Refills: 3 | Status: SHIPPED | OUTPATIENT
Start: 2022-03-10 | End: 2022-07-14

## 2022-03-10 RX ORDER — MEMANTINE HYDROCHLORIDE 10 MG/1
10 TABLET ORAL 2 TIMES DAILY
Qty: 180 TABLET | Refills: 3 | Status: SHIPPED | OUTPATIENT
Start: 2022-03-10 | End: 2022-07-14

## 2022-03-10 RX ORDER — DONEPEZIL HYDROCHLORIDE 5 MG/1
5 TABLET, FILM COATED ORAL NIGHTLY
Qty: 30 TABLET | Refills: 0 | Status: SHIPPED | OUTPATIENT
Start: 2022-03-10 | End: 2022-07-14

## 2022-03-10 NOTE — PROGRESS NOTES
Subjective:     Patient ID: Carolyn Snowden is a 71 y.o. female.    CC:   Chief Complaint   Patient presents with   • Memory Loss       HPI:   History of Present Illness   Carolyn Snowden is a 71 y.o. female who comes to clinic today for follow up on memory loss . She has noted symptoms since at least 2020 marked initially by forgetfulness . This has gradually worsened  over time. Additional symptoms have included impairments in short term memory . There have been associated  symptoms of depression . She denies  impairments in ADL's. Her family  manages her finances and she managers her medications with a pill planner and sometimes forgets but not often. She continues to drive.  . She is currently residing at home with her daughter. Her daughter is now having to care for her and requires LA to get Mrs. Snowden to and from appointments. She is very concerned about her safety with driving.      Maternal grandmother had Alzheimer's around age 93 prior to passing away. Her  passed away in 2012 in his sleep. REM sleep disorder 15+ years. Has been on clonazepam 1.5mg at night to treat this-sleep specialist Dr. Emeterio Green. 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 Unknown cause. She can taste. No tremors. No shuffling. No family history of parkinson's disease. 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 Completed 1.5 years of college. Right hand dominant. Challenges with learning new things.     Prior workup: MRI of the brain with and without contrast completed on 11/4/2021 and imaging as well as radiology report reviewed previously in clinic and she does have some mild to moderate chronic small vessel ischemic changes with no acute intracranial abnormalities, mild atrophy of the brain consistent with age.  Compared to MRI of the brain with and without contrast from March 5, 2018 there are no significant changes.  She has had hemoglobin A1c 5.5% with PCP, ferritin 730, CBC  overall normal, iron profile normal, lipid panel with total cholesterol 248, HDL 78 and  and now on low-dose Lipitor, CMP with creatinine 1.04 and otherwise normal.     Today 3/10/2022- she is accompanied by her daughter and here for follow-up on memory difficulties.  3 months ago at her initial visit we increased her memantine from 5 mg daily to 10mg twice a day.  She has tolerated this well.  No known side effects. She has never been on donepezil. She has also had vitamin B12 checked and it was 359 and folic acid 20 with methylmalonic acid normal and TSH and free T4 normal.  She has been going to cognitive therapy with one of our speech and language pathologist and her RBANS score was a 361 showing moderate to severe impairments in cognition, communication, immediate and delayed memory as well as language.  Her daughter notices trouble with focus, concentration and following more complex directions.  She does sit down with her and they work through her bills together.  There have been some times in the past where she has paid bills twice and not remembered. Her daughter helps with filling her medicine planner monthly. She generally does well with taking the medications but does occasionally miss doses.  Her daughter is not certain that the speech-language therapy has particularly shown improvement in Mrs. Snowden's overall cognitive functioning, but she has really enjoyed going. She has different tools at home to use that were provided by their services. She has recently started some physical therapy to help with her balance as well. Her daughter has noticed some occasional shaking in her mother's hands, usually while she is reaching to grab something, has not noticed a resting tremor.    The following portions of the patient's history were reviewed and updated as appropriate: allergies, current medications, past family history, past medical history, past social history, past surgical history and problem  list.    Past Medical History:   Diagnosis Date   • Acute angina (HCC)        Past Surgical History:   Procedure Laterality Date   • CYST REMOVAL     • TUBAL ABDOMINAL LIGATION         Social History     Socioeconomic History   • Marital status:    Tobacco Use   • Smoking status: Former Smoker   • Smokeless tobacco: Never Used   Vaping Use   • Vaping Use: Never used   Substance and Sexual Activity   • Alcohol use: Yes     Comment: occasional   • Drug use: Never   • Sexual activity: Not Currently       Family History   Problem Relation Age of Onset   • Liver disease Other    • Hypertension Other    • Heart disease Other    • Stroke Other    • Heart attack Other    • Liver disease Father    • Dementia Maternal Grandmother         Review of Systems   Constitutional: Negative for chills, fatigue, fever and unexpected weight change.   HENT: Negative for ear pain, hearing loss, nosebleeds, rhinorrhea and sore throat.    Eyes: Negative for photophobia, pain, discharge, itching and visual disturbance.   Respiratory: Negative for cough, chest tightness, shortness of breath and wheezing.    Cardiovascular: Negative for chest pain, palpitations and leg swelling.   Gastrointestinal: Negative for abdominal pain, blood in stool, constipation, diarrhea, nausea and vomiting.   Genitourinary: Negative for dysuria, frequency, hematuria and urgency.   Musculoskeletal: Positive for gait problem. Negative for arthralgias, back pain, joint swelling, myalgias, neck pain and neck stiffness.   Skin: Negative for rash and wound.   Allergic/Immunologic: Negative for environmental allergies and food allergies.   Neurological: Negative for dizziness, tremors, seizures, syncope, speech difficulty, weakness, light-headedness, numbness and headaches.   Hematological: Negative for adenopathy. Does not bruise/bleed easily.   Psychiatric/Behavioral: Positive for decreased concentration. Negative for agitation, confusion, hallucinations, sleep  "disturbance and suicidal ideas. The patient is not nervous/anxious.    All other systems reviewed and are negative.       Objective:  /68   Pulse 84   Ht 154.9 cm (61\")   Wt 71.7 kg (158 lb)   SpO2 97%   BMI 29.85 kg/m²     Neurologic Exam     Mental Status   Oriented to person, place, and time.   Registration: recalls 3 of 3 objects. Recall of objects at 5 minutes: 0.   Attention: decreased. Concentration: normal.   Speech: speech is normal   Level of consciousness: alert    Cranial Nerves   Cranial nerves II through XII intact.     Motor Exam   Muscle bulk: normal  Overall muscle tone: normal    Strength   Strength 5/5 throughout.     Gait, Coordination, and Reflexes     Gait  Gait: normal (overall steady, good arm swing, no shuffling noted, slightly off balance with turning)    Coordination   Romberg: negative  Finger to nose coordination: normal    Tremor   Resting tremor: absent  Intention tremor: present (minimal bue kinetic fine hand tremor noted)  Action tremor: absent    Reflexes   Reflexes 2+ except as noted.   Right : 2+  Left : 2+  Normal finger and heel taps bilaterally, no cogwheel rigidity, no resting tremors, ET tremor mild on drawing spiral in clinic       Physical Exam  Constitutional:       Appearance: Normal appearance.   Neurological:      Mental Status: She is alert and oriented to person, place, and time.      Coordination: Finger-Nose-Finger Test and Romberg Test normal.      Gait: Gait is intact.      Deep Tendon Reflexes: Strength normal.   Psychiatric:         Mood and Affect: Mood and affect normal.         Speech: Speech normal.         Behavior: Behavior is slowed.         Thought Content: Thought content normal.         Cognition and Memory: She exhibits impaired recent memory.         Judgment: Judgment normal.         MMSE initial visit 23/30  3/10/22- MMSE 27/30 0/3 recall    Assessment/Plan:       Diagnoses and all orders for this visit:    1. Alzheimer disease " (HCC)  -     donepezil (Aricept) 5 MG tablet; Take 1 tablet by mouth Every Night.  Dispense: 30 tablet; Refill: 0  -     donepezil (Aricept) 10 MG tablet; Take 1 tablet by mouth Every Night.  Dispense: 90 tablet; Refill: 3  -     memantine (Namenda) 10 MG tablet; Take 1 tablet by mouth 2 (Two) Times a Day.  Dispense: 180 tablet; Refill: 3         After reviewing her detailed speech-language pathology reports and evaluations we have discussed with Mrs. nSowden and her daughter that her findings are most consistent with Alzheimer's disease (could have a vascular component as well).  She has amnestic memory loss including moderate to severe cognitive impairment and immediate and delayed memory as well as language.  She will continue the memantine 10 mg twice a day.  We will also start her on the donepezil.  The previous directions with the memantine were difficult for Mrs. Snowden to follow so we will simplify the directions for the donepezil with a 5 mg tablet nightly for 30 days and then we will change her to the 10 mg tablet nightly and she will continue this. Her daughter with continue to assist with medication planner monthly and helping her pay bills We also discussed different types of dementia.  I did recommend that they continue to follow speech-language pathology exercises at home.  Also recommended she continue her physical therapy.  I also provided additional information about Alzheimer's disease, provided [facility] agency on aging catalog with additional local and national resources for the patient and her daughter to read.  At this time I would still not recommend driving unless she is accompanied by her daughter.  They are going to follow-up in our clinic in 6 months or sooner if needed. Reviewed medications, potential side effects and signs and symptoms to report. Discussed risk versus benefits of treatment plan with patient and/or family-including medications, labs and radiology that may be  ordered. Addressed questions and concerns during visit. Patient and/or family verbalized understanding and agree with plan.    AS THE PROVIDER, I PERSONALLY WORE PPE DURING ENTIRE FACE TO FACE ENCOUNTER IN CLINIC WITH THE PATIENT. PATIENT ALSO WORE PPE DURING ENTIRE FACE TO FACE ENCOUNTER EXCEPT FOR A MAX OF 30 SECONDS DURING NEUROLOGICAL EVALUATION OF CRANIAL NERVES AND THEN MASK WAS PLACED BACK OVER PATIENT FACE FOR REMAINDER OF VISIT. I WASHED MY HANDS BEFORE AND AFTER VISIT.    During this visit the following were done:  Labs Reviewed [x]    Labs Ordered []    Radiology Reports Reviewed []    Radiology Ordered []    PCP Records Reviewed [x]    Referring Provider Records Reviewed []    ER Records Reviewed []    Hospital Records Reviewed []    History Obtained From Family [x]    Radiology Images Reviewed []    Other Reviewed []    Records Requested []      Leola Andrews, JEAN-CLAUDE  3/11/2022

## 2022-03-14 DIAGNOSIS — I10 ESSENTIAL HYPERTENSION: ICD-10-CM

## 2022-03-14 RX ORDER — NIFEDIPINE 60 MG/1
TABLET, FILM COATED, EXTENDED RELEASE ORAL
Qty: 90 TABLET | Refills: 0 | Status: SHIPPED | OUTPATIENT
Start: 2022-03-14 | End: 2022-05-19 | Stop reason: DRUGHIGH

## 2022-03-14 RX ORDER — NIFEDIPINE 60 MG/1
TABLET, FILM COATED, EXTENDED RELEASE ORAL
Qty: 90 TABLET | Refills: 0 | Status: SHIPPED | OUTPATIENT
Start: 2022-03-14 | End: 2022-03-14

## 2022-03-16 ENCOUNTER — TREATMENT (OUTPATIENT)
Dept: PHYSICAL THERAPY | Facility: CLINIC | Age: 72
End: 2022-03-16

## 2022-03-16 DIAGNOSIS — R26.89 BALANCE PROBLEM: Primary | ICD-10-CM

## 2022-03-16 PROCEDURE — 97116 GAIT TRAINING THERAPY: CPT | Performed by: PHYSICAL THERAPIST

## 2022-03-16 PROCEDURE — 97110 THERAPEUTIC EXERCISES: CPT | Performed by: PHYSICAL THERAPIST

## 2022-03-16 PROCEDURE — 97112 NEUROMUSCULAR REEDUCATION: CPT | Performed by: PHYSICAL THERAPIST

## 2022-03-16 NOTE — PROGRESS NOTES
"Physical Therapy Daily Treatment Note  Visit: 9  Date of Initial Visit: Type: THERAPY  Noted: 1/5/2022    Carolyn Snowden reports: \"I feel like my balance is a little bit better.\"    Subjective Evaluation    Pain  No pain reported           Objective   See Exercise, Manual, and Modality Logs for complete treatment.    Exercise 1  Exercise Name 1: NuStep B UE/LEs  Equipment/Resistance 1: level 7  Time: 8 min  Exercise 2  Exercise Name 2: St balance with fwd and straddle stepping up onto/off rounded side of BOSU without UEA; st balance on both sides of BOSU with mini-squats and then B cervical rot/flex/ext  Equipment/Resistance 2: BOSU; min/mod A  Sets/Reps 2: 10  Exercise 3  Exercise Name 3: ST balance on rounded side of BOSU with alternating tapping cones in front; standing balance on flat surface of BOSU with mini-squats and B cervical rot/flex/ext  Equipment/Resistance 3: BOSU; min/mod A  Sets/Reps 3: 10  Exercise 4  Exercise Name 4: Ambulation on TM with and progressing to without UE A with emphasis on maintaining balance without UE A  Equipment/Resistance 4: TM @ 1.6 mph, 0% incline  Time 4: 8 min                PT Neuro          Assessment & Plan     Assessment    Assessment details: Pt requires min/mod A with standing dynamic balance activities on uneven surfaces.  Pt has LOB up to 50% of the time with ambulation on TM without UE A with pt demonstrating decreased B step length with increased johnathan.  Pt to benefit from skilled PT services to meet goals and improve overall functional mobility.    Plan  Plan details: Pt to benefit from skilled PT services to improve gait, balance, strength, and overall functional mobility.          Manual Therapy:     0     mins  18437;  Therapeutic Exercise:     10     mins  60703;     Neuromuscular Nuris:     20    mins  76303;    Therapeutic Activity:      0     mins  44358;     Gait Training:       10     mins  08546;     Ultrasound:      0     mins  55955;    Electrical " Stimulation:     0     mins  42980 ( );  Dry Needling      0     mins self-pay  Traction      0     mins 08689  Canalith Repositioning    0     mins 97052      Timed Treatment:   40   mins   Total Treatment:     40   mins    Chelly Goel, PT  [place] License #: [id number]    Physical Therapist

## 2022-03-23 ENCOUNTER — TREATMENT (OUTPATIENT)
Dept: PHYSICAL THERAPY | Facility: CLINIC | Age: 72
End: 2022-03-23

## 2022-03-23 DIAGNOSIS — R26.89 BALANCE PROBLEM: Primary | ICD-10-CM

## 2022-03-23 PROCEDURE — 97110 THERAPEUTIC EXERCISES: CPT | Performed by: PHYSICAL THERAPIST

## 2022-03-23 PROCEDURE — 97112 NEUROMUSCULAR REEDUCATION: CPT | Performed by: PHYSICAL THERAPIST

## 2022-03-23 NOTE — PROGRESS NOTES
"Physical Therapy Daily Treatment Note  Visit: 10  Date of Initial Visit: Type: THERAPY  Noted: 2022    Carolyn Snowden reports: \"I'm just tired. I'm doing alright though.\"    Subjective Evaluation    Pain  No pain reported         Objective   See Exercise, Manual, and Modality Logs for complete treatment.    Exercise 1  Exercise Name 1: NuStep B UE/LEs  Equipment/Resistance 1: level 7  Time: 8 min  Exercise 2  Exercise Name 2: Standing balance on rocker board R/L and ant/post with grabbing clips and placing them overhead on tband  Equipment/Resistance 2: rocker board; tband/ clips; min/mod A  Sets/Reps 2: 10  Exercise 3  Exercise Name 3: Sitting balance on medium sized swiss ball with B LE marching, TKE, hip ab/adduction, mini-sit up rolling back onto ball  Equipment/Resistance 3: medium swiss ball; min A  Sets/Reps 3: 10  Exercise 4  Exercise Name 4: DLP  Equipment/Resistance 4: total gym  Sets/Reps 4: 2  Time 4: 2 min  Exercise 5  Exercise Name 5: B alternating fwd lunge to rounded side of BOSU with toss/catch ball  Equipment/Resistance 5: BOSU; 2# ball; min/mod A  Sets/Reps 5: 10       PT Neuro          Assessment & Plan     Assessment    Assessment details: Pt requires min/mod A with standing dynamic balance activities with LOB up to 40% of the time.  Pt demonstrates increased B toeing out to increase stability with lunging towards BOSU.  Pt to benefit from skilled PT services to continue to meet goals and improve overall functional mobility.    Plan  Plan details: Pt to benefit from skilled PT services to improve gait, balance, strength, and overall functional mobility.          Manual Therapy:     0     mins  30862;  Therapeutic Exercise:     15     mins  07601;     Neuromuscular Nuris:     30    mins  90829;    Therapeutic Activity:      0     mins  85348;     Gait Trainin     mins  92129;     Ultrasound:      0     mins  85052;    Electrical Stimulation:     0     mins  11579 ( );  Dry " Needling      0     mins self-pay  Traction      0     mins 68080  Canalith Repositioning    0     mins 86810      Timed Treatment:   45   mins   Total Treatment:     45   mins    Chelly Goel, PT  [place] License #: [id number]    Physical Therapist

## 2022-04-05 ENCOUNTER — TELEPHONE (OUTPATIENT)
Dept: PHYSICAL THERAPY | Facility: OTHER | Age: 72
End: 2022-04-05

## 2022-05-17 ENCOUNTER — TELEPHONE (OUTPATIENT)
Dept: INTERNAL MEDICINE | Facility: CLINIC | Age: 72
End: 2022-05-17

## 2022-05-19 ENCOUNTER — LAB (OUTPATIENT)
Dept: LAB | Facility: HOSPITAL | Age: 72
End: 2022-05-19

## 2022-05-19 ENCOUNTER — OFFICE VISIT (OUTPATIENT)
Dept: INTERNAL MEDICINE | Facility: CLINIC | Age: 72
End: 2022-05-19

## 2022-05-19 VITALS
DIASTOLIC BLOOD PRESSURE: 64 MMHG | SYSTOLIC BLOOD PRESSURE: 102 MMHG | HEART RATE: 79 BPM | RESPIRATION RATE: 16 BRPM | OXYGEN SATURATION: 99 % | HEIGHT: 61 IN | WEIGHT: 144 LBS | BODY MASS INDEX: 27.19 KG/M2

## 2022-05-19 DIAGNOSIS — Z12.31 ENCOUNTER FOR SCREENING MAMMOGRAM FOR MALIGNANT NEOPLASM OF BREAST: ICD-10-CM

## 2022-05-19 DIAGNOSIS — R79.89 ELEVATED FERRITIN: ICD-10-CM

## 2022-05-19 DIAGNOSIS — I10 ESSENTIAL HYPERTENSION: ICD-10-CM

## 2022-05-19 DIAGNOSIS — M25.50 MULTIPLE JOINT PAIN: ICD-10-CM

## 2022-05-19 DIAGNOSIS — E78.2 MIXED HYPERLIPIDEMIA: Primary | ICD-10-CM

## 2022-05-19 DIAGNOSIS — R63.4 ABNORMAL WEIGHT LOSS: ICD-10-CM

## 2022-05-19 DIAGNOSIS — R63.0 POOR APPETITE: ICD-10-CM

## 2022-05-19 DIAGNOSIS — M25.562 ACUTE PAIN OF LEFT KNEE: ICD-10-CM

## 2022-05-19 LAB
ALBUMIN SERPL-MCNC: 4.6 G/DL (ref 3.5–5.2)
ALBUMIN/GLOB SERPL: 1.6 G/DL
ALP SERPL-CCNC: 56 U/L (ref 39–117)
ALT SERPL W P-5'-P-CCNC: 17 U/L (ref 1–33)
ANION GAP SERPL CALCULATED.3IONS-SCNC: 12.2 MMOL/L (ref 5–15)
AST SERPL-CCNC: 25 U/L (ref 1–32)
BASOPHILS # BLD AUTO: 0.04 10*3/MM3 (ref 0–0.2)
BASOPHILS NFR BLD AUTO: 0.7 % (ref 0–1.5)
BILIRUB SERPL-MCNC: 0.5 MG/DL (ref 0–1.2)
BUN SERPL-MCNC: 22 MG/DL (ref 8–23)
BUN/CREAT SERPL: 24.2 (ref 7–25)
CALCIUM SPEC-SCNC: 9.9 MG/DL (ref 8.6–10.5)
CHLORIDE SERPL-SCNC: 103 MMOL/L (ref 98–107)
CO2 SERPL-SCNC: 25.8 MMOL/L (ref 22–29)
CREAT SERPL-MCNC: 0.91 MG/DL (ref 0.57–1)
DEPRECATED RDW RBC AUTO: 39.2 FL (ref 37–54)
EGFRCR SERPLBLD CKD-EPI 2021: 66.8 ML/MIN/1.73
EOSINOPHIL # BLD AUTO: 0.13 10*3/MM3 (ref 0–0.4)
EOSINOPHIL NFR BLD AUTO: 2.3 % (ref 0.3–6.2)
ERYTHROCYTE [DISTWIDTH] IN BLOOD BY AUTOMATED COUNT: 13 % (ref 12.3–15.4)
FERRITIN SERPL-MCNC: 821 NG/ML (ref 13–150)
GLOBULIN UR ELPH-MCNC: 2.9 GM/DL
GLUCOSE SERPL-MCNC: 96 MG/DL (ref 65–99)
HCT VFR BLD AUTO: 38.1 % (ref 34–46.6)
HGB BLD-MCNC: 12.7 G/DL (ref 12–15.9)
IMM GRANULOCYTES # BLD AUTO: 0.02 10*3/MM3 (ref 0–0.05)
IMM GRANULOCYTES NFR BLD AUTO: 0.4 % (ref 0–0.5)
LYMPHOCYTES # BLD AUTO: 2.64 10*3/MM3 (ref 0.7–3.1)
LYMPHOCYTES NFR BLD AUTO: 46.8 % (ref 19.6–45.3)
MCH RBC QN AUTO: 27.9 PG (ref 26.6–33)
MCHC RBC AUTO-ENTMCNC: 33.3 G/DL (ref 31.5–35.7)
MCV RBC AUTO: 83.7 FL (ref 79–97)
MONOCYTES # BLD AUTO: 0.56 10*3/MM3 (ref 0.1–0.9)
MONOCYTES NFR BLD AUTO: 9.9 % (ref 5–12)
NEUTROPHILS NFR BLD AUTO: 2.25 10*3/MM3 (ref 1.7–7)
NEUTROPHILS NFR BLD AUTO: 39.9 % (ref 42.7–76)
NRBC BLD AUTO-RTO: 0 /100 WBC (ref 0–0.2)
PLATELET # BLD AUTO: 246 10*3/MM3 (ref 140–450)
PMV BLD AUTO: 11.8 FL (ref 6–12)
POTASSIUM SERPL-SCNC: 3.6 MMOL/L (ref 3.5–5.2)
PROT SERPL-MCNC: 7.5 G/DL (ref 6–8.5)
RBC # BLD AUTO: 4.55 10*6/MM3 (ref 3.77–5.28)
SODIUM SERPL-SCNC: 141 MMOL/L (ref 136–145)
WBC NRBC COR # BLD: 5.64 10*3/MM3 (ref 3.4–10.8)

## 2022-05-19 PROCEDURE — 80053 COMPREHEN METABOLIC PANEL: CPT | Performed by: NURSE PRACTITIONER

## 2022-05-19 PROCEDURE — 82728 ASSAY OF FERRITIN: CPT

## 2022-05-19 PROCEDURE — 99214 OFFICE O/P EST MOD 30 MIN: CPT | Performed by: NURSE PRACTITIONER

## 2022-05-19 PROCEDURE — 85025 COMPLETE CBC W/AUTO DIFF WBC: CPT | Performed by: NURSE PRACTITIONER

## 2022-05-19 PROCEDURE — 36415 COLL VENOUS BLD VENIPUNCTURE: CPT | Performed by: NURSE PRACTITIONER

## 2022-05-19 RX ORDER — NIFEDIPINE 30 MG/1
30 TABLET, EXTENDED RELEASE ORAL DAILY
Qty: 90 TABLET | Refills: 2 | Status: SHIPPED | OUTPATIENT
Start: 2022-05-19

## 2022-05-19 NOTE — PROGRESS NOTES
"Chief Complaint   Patient presents with   • Follow-up     6 month follow up    • Anorexia       HPI  Carolyn Snowden is a 71 y.o. female presents for follow-up on hyperlipidemia and HTN.  Taking meds as prescribed.  Diagnosed with alzheimers by neurology.  Currently on Aricept  And Namenda.  Pt reports poor appetite for the past 2-3 months.  She denies any abdominal pain or N/V.  She has lost 14 lbs since March.  She has been drinking Ensure to try to get her protein in her diet.  Daughter is concerned because her BP has been running low (DBP 50s-60s).  Has been on Nifedipine 60mg for several years.    Pt complaints of left knee \"cracking\" when she bends it.  She first noticed this a few months ago.  She denies any pain or swelling.  Also having pain in her hands and feet and they feel like they \"lock up\".  Worse in the morning.  She has not tried anything OTC for the pain.    The following portions of the patient's history were reviewed and updated as appropriate: allergies, current medications, past family history, past medical history, past social history, past surgical history and problem list.    Subjective  Review of Systems   Constitutional: Positive for unexpected weight loss. Negative for activity change, appetite change and fatigue.   HENT: Negative for congestion.    Respiratory: Negative for cough and shortness of breath.    Cardiovascular: Negative for chest pain and leg swelling.   Gastrointestinal: Negative for abdominal pain, diarrhea, nausea and vomiting.   Musculoskeletal: Positive for arthralgias (left knee, fingers, feet).   Neurological: Negative for dizziness, weakness and confusion.   Psychiatric/Behavioral: Negative for behavioral problems and decreased concentration.       Objective  Visit Vitals  /64 (BP Location: Left arm, Patient Position: Sitting, Cuff Size: Adult)   Pulse 79   Resp 16   Ht 154.9 cm (61\")   Wt 65.3 kg (144 lb)   SpO2 99%   Breastfeeding No   BMI 27.21 kg/m²    "     Physical Exam  Vitals and nursing note reviewed.   HENT:      Head: Normocephalic.   Eyes:      Pupils: Pupils are equal, round, and reactive to light.   Cardiovascular:      Rate and Rhythm: Normal rate and regular rhythm.      Pulses: Normal pulses.      Heart sounds: Normal heart sounds.   Pulmonary:      Effort: Pulmonary effort is normal.      Breath sounds: Normal breath sounds.   Abdominal:      General: Bowel sounds are normal.      Palpations: Abdomen is soft.      Tenderness: There is no abdominal tenderness.   Musculoskeletal:      Right hand: Normal.      Left hand: Normal.      Left knee: Crepitus present. No swelling or deformity. Normal range of motion.   Skin:     General: Skin is warm and dry.      Capillary Refill: Capillary refill takes less than 2 seconds.   Neurological:      General: No focal deficit present.      Mental Status: She is alert and oriented to person, place, and time.      Gait: Gait is intact.   Psychiatric:         Attention and Perception: Attention normal.         Mood and Affect: Mood normal.         Behavior: Behavior normal.          Procedures     Assessment and Plan  Diagnoses and all orders for this visit:    1. Mixed hyperlipidemia (Primary)    2. Essential hypertension  -     NIFEdipine XL (PROCARDIA XL) 30 MG 24 hr tablet; Take 1 tablet by mouth Daily.  Dispense: 90 tablet; Refill: 2    3. Acute pain of left knee  -     XR Knee 1 or 2 View Left; Future    4. Abnormal weight loss  -     CBC w AUTO Differential; Future  -     Comprehensive Metabolic Panel    5. Encounter for screening mammogram for malignant neoplasm of breast  -     Mammo Screening Digital Tomosynthesis Bilateral With CAD; Future    6. Multiple joint pain    7. Poor appetite    cont low dose statin for lipids  Decrease Nifedipine dose for low BP, instructed pt and daughter to monitor BP at home  XR left knee, likely arthritis  Joint pain is likely arthritis, recommend she try Tylenol Arthritis  OTC  Pt wants to schedule mammogram  Wt loss/decrease appetite is likely due to Donepezil  Instructed frequent protein snacks  Labs today  Colonoscopy UTD    Return in about 3 months (around 8/19/2022) for weight loss.         Ezekiel Estrada, APRN

## 2022-05-24 ENCOUNTER — TELEPHONE (OUTPATIENT)
Dept: INTERNAL MEDICINE | Facility: CLINIC | Age: 72
End: 2022-05-24

## 2022-06-03 ENCOUNTER — HOSPITAL ENCOUNTER (OUTPATIENT)
Dept: MAMMOGRAPHY | Facility: HOSPITAL | Age: 72
Discharge: HOME | End: 2022-06-03
Admitting: NURSE PRACTITIONER

## 2022-06-03 ENCOUNTER — APPOINTMENT (OUTPATIENT)
Dept: OTHER | Facility: HOSPITAL | Age: 72
End: 2022-06-03

## 2022-06-03 DIAGNOSIS — Z12.31 ENCOUNTER FOR SCREENING MAMMOGRAM FOR MALIGNANT NEOPLASM OF BREAST: ICD-10-CM

## 2022-06-03 PROCEDURE — 77063 BREAST TOMOSYNTHESIS BI: CPT

## 2022-06-03 PROCEDURE — 77067 SCR MAMMO BI INCL CAD: CPT | Performed by: RADIOLOGY

## 2022-06-03 PROCEDURE — 77067 SCR MAMMO BI INCL CAD: CPT

## 2022-06-03 PROCEDURE — 77063 BREAST TOMOSYNTHESIS BI: CPT | Performed by: RADIOLOGY

## 2022-07-14 ENCOUNTER — OFFICE VISIT (OUTPATIENT)
Dept: INTERNAL MEDICINE | Facility: CLINIC | Age: 72
End: 2022-07-14

## 2022-07-14 VITALS
DIASTOLIC BLOOD PRESSURE: 70 MMHG | HEIGHT: 61 IN | TEMPERATURE: 96.9 F | SYSTOLIC BLOOD PRESSURE: 120 MMHG | WEIGHT: 140 LBS | HEART RATE: 64 BPM | OXYGEN SATURATION: 100 % | BODY MASS INDEX: 26.43 KG/M2

## 2022-07-14 DIAGNOSIS — F02.80 ALZHEIMER DISEASE: ICD-10-CM

## 2022-07-14 DIAGNOSIS — E78.2 MIXED HYPERLIPIDEMIA: ICD-10-CM

## 2022-07-14 DIAGNOSIS — G30.9 ALZHEIMER DISEASE: ICD-10-CM

## 2022-07-14 DIAGNOSIS — R63.4 ABNORMAL WEIGHT LOSS: Primary | ICD-10-CM

## 2022-07-14 PROCEDURE — 99214 OFFICE O/P EST MOD 30 MIN: CPT | Performed by: NURSE PRACTITIONER

## 2022-07-14 NOTE — PROGRESS NOTES
"Chief Complaint   Patient presents with   • Weight Loss   • Hypertension       HPI  Carolyn Snowden is a 71 y.o. female presents for follow-up on weight loss and HTN.  Patient has lost about 14 pounds since January.  Her Nifedipine was decreased at her last appt due to BP running on the lower side.  The patient and I discussed last visit that her memory medication had a side effect of anorexia.  She states that she decided to stop her Clonazepam, Donepezil, and Namenda.  She states she stopped them about 4 days ago because she was feeling foggy headed and not like herself.  She states that she didn't like feeling that way.  She follows with neurology again in September.  Pt has no complaints at this time.  Has lost 4 more lbs since her last visit    The following portions of the patient's history were reviewed and updated as appropriate: allergies, current medications, past family history, past medical history, past social history, past surgical history and problem list.    Subjective  Review of Systems   Constitutional: Positive for unexpected weight loss. Negative for activity change, appetite change and fatigue.   HENT: Negative for congestion.    Respiratory: Negative for cough and shortness of breath.    Cardiovascular: Negative for chest pain and leg swelling.   Gastrointestinal: Negative for abdominal pain.   Neurological: Negative for dizziness, weakness and confusion.   Psychiatric/Behavioral: Negative for behavioral problems and decreased concentration.       Objective  Visit Vitals  /70 (BP Location: Left arm, Patient Position: Sitting)   Pulse 64   Temp 96.9 °F (36.1 °C) (Infrared)   Ht 154.9 cm (60.98\")   Wt 63.5 kg (140 lb)   SpO2 100%   BMI 26.47 kg/m²        Physical Exam  Vitals and nursing note reviewed.   HENT:      Head: Normocephalic.   Eyes:      Pupils: Pupils are equal, round, and reactive to light.   Cardiovascular:      Rate and Rhythm: Normal rate and regular rhythm.      Pulses: " Normal pulses.      Heart sounds: Normal heart sounds.   Pulmonary:      Effort: Pulmonary effort is normal.      Breath sounds: Normal breath sounds.   Skin:     General: Skin is warm and dry.      Capillary Refill: Capillary refill takes less than 2 seconds.   Neurological:      General: No focal deficit present.      Mental Status: She is alert and oriented to person, place, and time.      Gait: Gait is intact.   Psychiatric:         Attention and Perception: Attention normal.         Mood and Affect: Mood normal.         Behavior: Behavior normal.         Thought Content: Thought content normal.          Procedures      Assessment and Plan  Diagnoses and all orders for this visit:    1. Abnormal weight loss (Primary)    2. Alzheimer disease (HCC)    3. Mixed hyperlipidemia    Pt stopped her memory meds and Clonazepam  I highly recommend pt keep appt with neurology in Sept  Cont Nifedipine for HTN, well controlled  Cont statin for lipids  Instructed to cont to monitor her weight  MCW due in Jan    Return in about 6 months (around 1/14/2023) for Medicare Wellness.    .      JEAN-CLAUDE [place]

## 2022-08-11 ENCOUNTER — HOSPITAL ENCOUNTER (OUTPATIENT)
Dept: MAMMOGRAPHY | Facility: HOSPITAL | Age: 72
Discharge: HOME | End: 2022-08-11
Admitting: RADIOLOGY

## 2022-08-11 ENCOUNTER — TRANSCRIBE ORDERS (OUTPATIENT)
Dept: MAMMOGRAPHY | Facility: HOSPITAL | Age: 72
End: 2022-08-11

## 2022-08-11 DIAGNOSIS — R92.8 ABNORMAL MAMMOGRAM: ICD-10-CM

## 2022-08-11 DIAGNOSIS — R92.8 ABNORMAL MAMMOGRAM: Primary | ICD-10-CM

## 2022-08-11 PROCEDURE — 77065 DX MAMMO INCL CAD UNI: CPT

## 2022-08-11 PROCEDURE — 77065 DX MAMMO INCL CAD UNI: CPT | Performed by: RADIOLOGY

## 2022-08-25 ENCOUNTER — OFFICE VISIT (OUTPATIENT)
Dept: INTERNAL MEDICINE | Facility: CLINIC | Age: 72
End: 2022-08-25

## 2022-08-25 VITALS
OXYGEN SATURATION: 100 % | RESPIRATION RATE: 16 BRPM | HEIGHT: 60 IN | DIASTOLIC BLOOD PRESSURE: 78 MMHG | SYSTOLIC BLOOD PRESSURE: 144 MMHG | HEART RATE: 71 BPM | BODY MASS INDEX: 26.93 KG/M2 | TEMPERATURE: 96.9 F | WEIGHT: 137.2 LBS

## 2022-08-25 DIAGNOSIS — R92.8 ABNORMAL MAMMOGRAM: ICD-10-CM

## 2022-08-25 DIAGNOSIS — F02.80 ALZHEIMER DISEASE: ICD-10-CM

## 2022-08-25 DIAGNOSIS — G30.9 ALZHEIMER DISEASE: ICD-10-CM

## 2022-08-25 DIAGNOSIS — F41.1 GENERALIZED ANXIETY DISORDER: Primary | ICD-10-CM

## 2022-08-25 PROCEDURE — 99213 OFFICE O/P EST LOW 20 MIN: CPT | Performed by: NURSE PRACTITIONER

## 2022-08-25 RX ORDER — CLONAZEPAM 1 MG/1
1 TABLET ORAL NIGHTLY
COMMUNITY
Start: 2022-08-06

## 2022-08-25 NOTE — PROGRESS NOTES
"Chief Complaint   Patient presents with   • Anxiety     Having a needle biopsy       HPI  Carolyn Snowden is a 72 y.o. female presents for anxiety.  She states she is scheduled for a breast biopsy on 9/1 and she is really nervous about it.  She has questions about the procedure that she would like to have answered before she agrees to the biopsy.  She also wants to discuss medication to take before the procedure due to the anxiety.   Her daughter has Stage IV breast cancer.  She is getting radiation.    The following portions of the patient's history were reviewed and updated as appropriate: allergies, current medications, past family history, past medical history, past social history, past surgical history and problem list.    Subjective  Review of Systems   Constitutional: Negative for activity change, appetite change and fatigue.   HENT: Negative for congestion.    Respiratory: Negative for cough and shortness of breath.    Cardiovascular: Negative for chest pain and leg swelling.   Gastrointestinal: Negative for abdominal pain.   Neurological: Negative for dizziness, weakness and confusion.   Psychiatric/Behavioral: Negative for behavioral problems and decreased concentration. The patient is nervous/anxious.        Objective  Visit Vitals  /78 (BP Location: Left arm, Patient Position: Sitting, Cuff Size: Adult)   Pulse 71   Temp 96.9 °F (36.1 °C)   Resp 16   Ht 152.4 cm (60\")   Wt 62.2 kg (137 lb 3.2 oz)   SpO2 100%   BMI 26.80 kg/m²        Physical Exam  Vitals and nursing note reviewed.   HENT:      Head: Normocephalic.   Eyes:      Pupils: Pupils are equal, round, and reactive to light.   Pulmonary:      Effort: Pulmonary effort is normal.   Skin:     General: Skin is warm and dry.      Capillary Refill: Capillary refill takes less than 2 seconds.   Neurological:      General: No focal deficit present.      Mental Status: She is alert and oriented to person, place, and time.      Gait: Gait is " intact.   Psychiatric:         Attention and Perception: Attention normal.         Mood and Affect: Mood is anxious.         Behavior: Behavior normal.          Procedures     Assessment and Plan  Diagnoses and all orders for this visit:    1. Generalized anxiety disorder (Primary)    2. Abnormal mammogram    3. Alzheimer disease (HCC)    Discussed with pt to take her Clonazepam dose 30 minutes prior to the procedure  # for breast center given to pt so they could answer her questions about biopsy  Highly encouraged pt to keep appt due to her daughtering having breast ca  Pt stopped Alzheimer meds    Return for Next scheduled follow up.      JEAN-CLAUDE Richmond

## 2022-08-26 ENCOUNTER — TELEPHONE (OUTPATIENT)
Dept: MAMMOGRAPHY | Facility: HOSPITAL | Age: 72
End: 2022-08-26

## 2022-08-26 NOTE — TELEPHONE ENCOUNTER
Returned patient call; questions answered, support given. Encouraged to call back with further questions or concerns. Patient verbalized understanding.

## 2022-09-01 ENCOUNTER — HOSPITAL ENCOUNTER (OUTPATIENT)
Dept: MAMMOGRAPHY | Facility: HOSPITAL | Age: 72
Discharge: HOME | End: 2022-09-01

## 2022-09-01 DIAGNOSIS — R92.8 ABNORMAL MAMMOGRAM: ICD-10-CM

## 2022-09-01 PROCEDURE — 76098 X-RAY EXAM SURGICAL SPECIMEN: CPT

## 2022-09-01 PROCEDURE — 19081 BX BREAST 1ST LESION STRTCTC: CPT | Performed by: RADIOLOGY

## 2022-09-01 PROCEDURE — 88305 TISSUE EXAM BY PATHOLOGIST: CPT | Performed by: NURSE PRACTITIONER

## 2022-09-01 PROCEDURE — 19082 BX BREAST ADD LESION STRTCTC: CPT | Performed by: RADIOLOGY

## 2022-09-01 PROCEDURE — 0 LIDOCAINE 1 % SOLUTION: Performed by: RADIOLOGY

## 2022-09-01 PROCEDURE — A4648 IMPLANTABLE TISSUE MARKER: HCPCS

## 2022-09-01 PROCEDURE — 77065 DX MAMMO INCL CAD UNI: CPT | Performed by: RADIOLOGY

## 2022-09-01 RX ORDER — LIDOCAINE HYDROCHLORIDE AND EPINEPHRINE 10; 10 MG/ML; UG/ML
10 INJECTION, SOLUTION INFILTRATION; PERINEURAL ONCE
Status: COMPLETED | OUTPATIENT
Start: 2022-09-01 | End: 2022-09-01

## 2022-09-01 RX ORDER — LIDOCAINE HYDROCHLORIDE 10 MG/ML
5 INJECTION, SOLUTION INFILTRATION; PERINEURAL ONCE
Status: COMPLETED | OUTPATIENT
Start: 2022-09-01 | End: 2022-09-01

## 2022-09-01 RX ADMIN — LIDOCAINE HYDROCHLORIDE,EPINEPHRINE BITARTRATE 3 ML: 10; .01 INJECTION, SOLUTION INFILTRATION; PERINEURAL at 13:50

## 2022-09-01 RX ADMIN — LIDOCAINE HYDROCHLORIDE,EPINEPHRINE BITARTRATE 4 ML: 10; .01 INJECTION, SOLUTION INFILTRATION; PERINEURAL at 14:38

## 2022-09-01 RX ADMIN — Medication 1.5 ML: at 13:48

## 2022-09-01 RX ADMIN — Medication 3 ML: at 14:35

## 2022-09-01 NOTE — PROGRESS NOTES
Alert and orientated. Denies discomfort, no active bleeding, steri-strips not visualized, gauze dressing intact. Ice packs given. Verbalizes and demonstrates understanding of post-care instructions, written copy given.

## 2022-09-06 LAB
CYTO UR: NORMAL
CYTO UR: NORMAL
LAB AP CASE REPORT: NORMAL
LAB AP CASE REPORT: NORMAL
LAB AP CLINICAL INFORMATION: NORMAL
LAB AP CLINICAL INFORMATION: NORMAL
LAB AP DIAGNOSIS COMMENT: NORMAL
LAB AP DIAGNOSIS COMMENT: NORMAL
PATH REPORT.FINAL DX SPEC: NORMAL
PATH REPORT.FINAL DX SPEC: NORMAL
PATH REPORT.GROSS SPEC: NORMAL
PATH REPORT.GROSS SPEC: NORMAL

## 2022-09-07 ENCOUNTER — TELEPHONE (OUTPATIENT)
Dept: MAMMOGRAPHY | Facility: HOSPITAL | Age: 72
End: 2022-09-07

## 2022-09-12 ENCOUNTER — OFFICE VISIT (OUTPATIENT)
Dept: NEUROLOGY | Facility: CLINIC | Age: 72
End: 2022-09-12

## 2022-09-12 VITALS
DIASTOLIC BLOOD PRESSURE: 70 MMHG | OXYGEN SATURATION: 99 % | BODY MASS INDEX: 27.09 KG/M2 | HEART RATE: 76 BPM | WEIGHT: 138 LBS | HEIGHT: 60 IN | SYSTOLIC BLOOD PRESSURE: 122 MMHG

## 2022-09-12 DIAGNOSIS — G30.9 ALZHEIMER DISEASE: Primary | ICD-10-CM

## 2022-09-12 DIAGNOSIS — F02.80 ALZHEIMER DISEASE: Primary | ICD-10-CM

## 2022-09-12 PROCEDURE — 99214 OFFICE O/P EST MOD 30 MIN: CPT | Performed by: NURSE PRACTITIONER

## 2022-09-12 RX ORDER — MEMANTINE HYDROCHLORIDE 14 MG/1
14 CAPSULE, EXTENDED RELEASE ORAL DAILY
Qty: 14 CAPSULE | Refills: 0 | Status: SHIPPED | OUTPATIENT
Start: 2022-09-12

## 2022-09-12 RX ORDER — MEMANTINE HYDROCHLORIDE 7 MG/1
7 CAPSULE, EXTENDED RELEASE ORAL EVERY MORNING
Qty: 14 CAPSULE | Refills: 0 | Status: SHIPPED | OUTPATIENT
Start: 2022-09-12

## 2022-09-12 RX ORDER — MEMANTINE HYDROCHLORIDE 28 MG/1
28 CAPSULE, EXTENDED RELEASE ORAL EVERY MORNING
Qty: 90 CAPSULE | Refills: 3 | Status: SHIPPED | OUTPATIENT
Start: 2022-09-12
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
## 2022-11-09 ENCOUNTER — OFFICE VISIT (OUTPATIENT)
Dept: INTERNAL MEDICINE | Facility: CLINIC | Age: 72
End: 2022-11-09

## 2022-11-09 VITALS
TEMPERATURE: 97.4 F | RESPIRATION RATE: 16 BRPM | SYSTOLIC BLOOD PRESSURE: 120 MMHG | WEIGHT: 137 LBS | HEIGHT: 60 IN | DIASTOLIC BLOOD PRESSURE: 70 MMHG | HEART RATE: 64 BPM | BODY MASS INDEX: 26.9 KG/M2 | OXYGEN SATURATION: 97 %

## 2022-11-09 DIAGNOSIS — T14.8XXA BLISTER: Primary | ICD-10-CM

## 2022-11-09 PROCEDURE — 99213 OFFICE O/P EST LOW 20 MIN: CPT | Performed by: INTERNAL MEDICINE

## 2022-11-09 RX ORDER — MEMANTINE HYDROCHLORIDE 28 MG/1
CAPSULE, EXTENDED RELEASE ORAL
COMMUNITY
Start: 2022-09-12

## 2022-11-09 NOTE — PROGRESS NOTES
"     Office Note      Date: 2022  Patient Name: Carolyn Snowden  MRN: [id number]  : 1950    Chief Complaint   Patient presents with   • Skin Lesion     Leg        History of Present Illness: Carolyn Snowden is a 72 y.o. female who presents for Skin Lesion (Leg ). Has blister on RLE x 2-3 weeks. Does not itch or hurt. Has grown in size.     Subjective      Review of Systems:   Pertinent review of systems per HPI.    Review of Systems   Constitutional: Negative for activity change, appetite change, chills, diaphoresis, fatigue, fever and unexpected weight change.   HENT: Negative for congestion, dental problem, drooling, ear discharge, ear pain, facial swelling, hearing loss and mouth sores.    Eyes: Negative for pain, discharge and itching.   Respiratory: Negative for apnea, cough, choking, chest tightness and shortness of breath.    Cardiovascular: Negative for chest pain, palpitations and leg swelling.   Gastrointestinal: Negative for abdominal distention, abdominal pain, blood in stool, constipation and diarrhea.   Endocrine: Negative for cold intolerance, heat intolerance, polydipsia and polyuria.   Genitourinary: Negative for difficulty urinating, dysuria, frequency and hematuria.   Skin: Negative for color change, pallor, rash and wound.   Allergic/Immunologic: Negative for environmental allergies, food allergies and immunocompromised state.   Neurological: Negative for dizziness, weakness and light-headedness.   Psychiatric/Behavioral: Negative for agitation, behavioral problems, confusion, decreased concentration and self-injury. The patient is not nervous/anxious.    All other systems reviewed and are negative.    Allergies   Allergen Reactions   • Ace Inhibitors Swelling       Objective     Physical Exam:  Vital Signs:   Vitals:    22 1344   BP: 120/70   Pulse: 64   Resp: 16   Temp: 97.4 °F (36.3 °C)   TempSrc: Temporal   SpO2: 97%   Weight: 62.1 kg (137 lb)   Height: 152.4 cm (60\") "      Body mass index is 26.76 kg/m².    Physical Exam  Skin:     Comments: RLE on anterior shin has non tender, superficial blister w/o any pus or surround erythema           Assessment / Plan      Assessment & Plan:    1. Blister  Advised CTM for resolution. Does not appear infected. Allow blister to self resolve. If it opens up then keep wound clean and apply thin layer of topical antiseptic like neosporin. Ref to derm in case blister gets bigger or does not go away on 1-2 weeks  - Ambulatory Referral to Dermatology      Keila Hughes MD  11/09/2022

## 2022-11-11 ENCOUNTER — TELEPHONE (OUTPATIENT)
Dept: INTERNAL MEDICINE | Facility: CLINIC | Age: 72
End: 2022-11-11

## 2022-11-11 NOTE — TELEPHONE ENCOUNTER
Caller: DYAN DIAZ    Relationship: Emergency Contact    Best call back number: [phone]    What form or medical record are you requesting: A LETTER CONFIRMING  HER MEMORY LOSS AND DEMINTIA.     Who is requesting this form or medical record from you: [employer]     How would you like to receive the form or medical records (pick-up, mail, fax): FAX   If fax, what is the fax number: [phone]    Timeframe paperwork needed: IMMEDIATELY     Additional notes: PATIENT SEEN ON MONDAY GOING THRUOGH IT WITH INSURANCE FROM [employer], THEY WANT PATIENT TO GET A LETTER CONFIRMING  HER MEMORY LOSS AND DEMINTIA.     DAUGHTER IN CHARGE OF MOMS AFFIRS NOW. FUTER COMMUNICATION NEED TO BE WITH HER  DAUGHTER ( DYAN).     PLEASE CONTACT DAUGHTER WITH CONFORMATION FOR HER REQUEST.    THIS IS A TIME SENSITIVE  MATTER.

## 2023-02-24 DIAGNOSIS — E78.2 MIXED HYPERLIPIDEMIA: ICD-10-CM

## 2023-02-24 RX ORDER — ATORVASTATIN CALCIUM 10 MG/1
TABLET, FILM COATED ORAL
Qty: 90 TABLET | Refills: 0 | Status: SHIPPED | OUTPATIENT
Start: 2023-02-24 | End: 2023-02-27

## 2023-02-25 DIAGNOSIS — E78.2 MIXED HYPERLIPIDEMIA: ICD-10-CM

## 2023-02-27 RX ORDER — ATORVASTATIN CALCIUM 10 MG/1
TABLET, FILM COATED ORAL
Qty: 90 TABLET | Refills: 0 | Status: SHIPPED | OUTPATIENT
Start: 2023-02-27

## 2023-05-02 DIAGNOSIS — I10 ESSENTIAL HYPERTENSION: ICD-10-CM

## 2023-05-02 RX ORDER — NIFEDIPINE 30 MG/1
30 TABLET, EXTENDED RELEASE ORAL DAILY
Qty: 90 TABLET | Refills: 2 | Status: SHIPPED | OUTPATIENT
Start: 2023-05-02

## 2023-08-07 ENCOUNTER — OFFICE VISIT (OUTPATIENT)
Dept: NEUROLOGY | Facility: CLINIC | Age: 73
End: 2023-08-07

## 2023-08-07 VITALS
HEART RATE: 74 BPM | BODY MASS INDEX: 26.9 KG/M2 | DIASTOLIC BLOOD PRESSURE: 78 MMHG | SYSTOLIC BLOOD PRESSURE: 118 MMHG | OXYGEN SATURATION: 100 % | WEIGHT: 137 LBS | HEIGHT: 60 IN

## 2023-08-07 DIAGNOSIS — G30.1 MILD LATE ONSET ALZHEIMER'S DEMENTIA WITHOUT BEHAVIORAL DISTURBANCE, PSYCHOTIC DISTURBANCE, MOOD DISTURBANCE, OR ANXIETY: Primary | ICD-10-CM

## 2023-08-07 DIAGNOSIS — E78.2 MIXED HYPERLIPIDEMIA: ICD-10-CM

## 2023-08-07 DIAGNOSIS — F02.A0 MILD LATE ONSET ALZHEIMER'S DEMENTIA WITHOUT BEHAVIORAL DISTURBANCE, PSYCHOTIC DISTURBANCE, MOOD DISTURBANCE, OR ANXIETY: Primary | ICD-10-CM

## 2023-08-07 PROCEDURE — 3074F SYST BP LT 130 MM HG: CPT | Performed by: NURSE PRACTITIONER

## 2023-08-07 PROCEDURE — 1159F MED LIST DOCD IN RCRD: CPT | Performed by: NURSE PRACTITIONER

## 2023-08-07 PROCEDURE — 99214 OFFICE O/P EST MOD 30 MIN: CPT | Performed by: NURSE PRACTITIONER

## 2023-08-07 PROCEDURE — 3078F DIAST BP <80 MM HG: CPT | Performed by: NURSE PRACTITIONER

## 2023-08-07 PROCEDURE — 1160F RVW MEDS BY RX/DR IN RCRD: CPT | Performed by: NURSE PRACTITIONER

## 2023-08-07 RX ORDER — ATORVASTATIN CALCIUM 10 MG/1
10 TABLET, FILM COATED ORAL NIGHTLY
Qty: 90 TABLET | Refills: 0 | Status: SHIPPED | OUTPATIENT
Start: 2023-08-07

## 2023-08-07 RX ORDER — MEMANTINE HYDROCHLORIDE 28 MG/1
28 CAPSULE, EXTENDED RELEASE ORAL EVERY MORNING
Qty: 90 CAPSULE | Refills: 3 | Status: SHIPPED | OUTPATIENT
Start: 2023-08-07

## 2023-08-07 NOTE — LETTER
August 7, 2023     JEAN-CLAUDE Lindsey  [address]  [address]  [place] [id number]    Patient: Carolyn Snowden   YOB: 1950   Date of Visit: 8/7/2023       Dear JEAN-CLAUDE Lindsey    Carolyn Snowden was in my office today. Below is a copy of my note.    If you have questions, please do not hesitate to call me. I look forward to following Carolyn along with you.         Sincerely,        JEAN-CLAUDE Wilson        CC: Emeterio Green MD    Subjective:     Patient ID: Carolyn Snowden is a 73 y.o. female.    CC:   Chief Complaint   Patient presents with    Alzheimer's Disease       HPI:   History of Present Illness  Today 8/7/2023-  This is a 73-year-old female who presents for 1-year neurology follow-up on Alzheimer's with symptoms present since 2020. Her daughter is her primary caretaker and does require [facility] to bring her to and from appointments and provide care for her. She does have mild Alzheimer's. At her last visit in clinic, she actually drove herself to the visit. She had stopped her medications without notifying our clinic. At the time, we decided to simplify dosing of memantine (Namenda XR) with extended-release dosing. She is here for follow-up and refills on medications today. She is accompanied by her daughter, Magalys Ervin, today. She notes she has continued memory loss and repeating the same things over and over. There have been no concerns of delusions or hallucinations. She does continue to drive, and her daughter has had some concerns with driving.    Today, her daughter reports her mother's short-term memory is about the same as it was at her last visit. She confirms she is still taking Namenda XR and is up to 28 mg daily now. She states she can tell a difference in her memory since starting the medication. She notes when she gets up in the morning, she can do something, but later on that day, she may have forgotten what she did in the morning. Her  daughter states her mother will ask her a question, and 3 minutes later, she will ask the same question again. Initially, she would ask the question, and ask it again by the middle of the day. Now, she repeats the question within minutes instead of hours.     She resides with her daughter. She is still driving, and she believes she does well; however, her daughter states it is a level of concern, and it scares her and her family. Her daughter states she does not get lost because she is going to the same local places. She traveled to [place] twice by herself, and she did okay with that. When she was in [place], a 15-to-20-minute trip took her an hour because it was an unfamiliar place. The furthest she drives locally is [place], [place]. She notes she goes to the same places, such as the mall, and back to [place], [place]. Her daughter believes she has done okay with this. She does not have an iPhone. She feels very secure driving from [place], [place] to [place], [place], and is planning on driving there one day this week. The last time she drove to [place] was in 04/2023. Her daughter is comfortable with her mother driving to [place] if it is daylight.    She does not believe she is taking atorvastatin (Lipitor) anymore. She was supposed to see JEAN-CLAUDE Lawson, on 08/04/2023 but missed this appointment. She also missed an appointment in 01/2023. She has not seen primary care since 11/2022. Her daughter states she will reschedule her mother's appointment.     She reports she is sleeping well. She adds she has not had any nightmares in quite some time.    Her other daughter passed away on 04/07/2023 from cancer. Her daughter states her mother was doing well going to [facility], [place] to the [place], and walking. Since the death of her daughter, she does not have the motivation. Her sister lives in the same neighborhood and comes over every day. Her daughter notes she will allow her mother to grieve, but she  "will not allow her to stay there and \"wilt away.\" She states when her daughter first passed, she took to her bed.    She denies any big changes in her health, surgeries, or hospitalizations.    She has fallen once in the past year. She fell in the street when she was taking out the trash at night.     Prior neurological workup and history:  She has noted symptoms since at least 2020 marked initially by forgetfulness . This has gradually worsened  over time. Additional symptoms have included impairments in short term memory . There have been associated  symptoms of depression . She denies  impairments in ADL's. Her family  manages her finances and she managers her medications with a pill planner and sometimes forgets but not often. She continues to drive.  . She is currently residing at home with her daughter. Her daughter is now having to care for her and requires [facility] to get Mrs. Snowden to and from appointments. We feel that her symptoms are most consistent with Alzheimer's disease with a possible vascular component. 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     Her highest level of education is 2 years of college. She lives with her daughter. She is able to pay bills, take her medications, and fix meals without difficulty. Daughter does have to monitor her medications as she often takes these incorrectly.    She did not tolerate the donepezil due to nightmares. Her nightmares have resolved.     Maternal grandmother had Alzheimer's around age 93 prior to passing away. Her  passed away in 2012 in his sleep. REM sleep disorder 15+ years. Has been on clonazepam 1.5mg at night to treat this-sleep specialist Dr. Emeterio Green. 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 Unknown cause. She can taste. No tremors. No shuffling. No family history of parkinson's disease. 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 Completed 1.5 years of college. Right hand dominant. Challenges with learning new things.     MRI of the brain with and without contrast completed on 11/4/2021 and imaging as well as radiology report reviewed previously in clinic and she does have some mild to moderate chronic small vessel ischemic changes with no acute intracranial abnormalities, mild atrophy of the brain consistent with age.  Compared to MRI of the brain with and without contrast from March 5, 2018 there are no significant changes.  She has had hemoglobin A1c 5.5% with PCP, ferritin 730, CBC overall normal, iron profile normal, lipid panel with total cholesterol 248, HDL 78 and  and now on low-dose Lipitor, CMP with creatinine 1.04 and otherwise normal.      She has also had vitamin B12 checked and it was 359 and folic acid 20 with methylmalonic acid normal and TSH and free T4 normal.  She has completed cognitive therapy with one of our speech and language pathologist and her RBANS score was a 361 showing moderate to severe impairments in cognition, communication, immediate and delayed memory as well as language.  Her daughter notices trouble with focus, concentration and following more complex directions.  She does sit down with her and they work through her bills together.  There have been some times in the past where she has paid bills twice and not remembered. Her daughter helps with filling her medicine planner monthly. She generally does well with taking the medications but does occasionally miss doses.  Her daughter is not certain that the speech-language therapy has particularly shown improvement in Mrs. Snowedn's overall cognitive functioning, but she has really enjoyed going. She has different tools at home to use that were provided by their services. She has recently started some physical therapy to help with her balance as well. Her daughter has noticed some  "occasional shaking in her mother's hands, usually while she is reaching to grab something, has not noticed a resting tremor.     The following portions of the patient's history were reviewed and updated as appropriate: allergies, current medications, past family history, past medical history, past social history, past surgical history, and problem list.    Past Medical History:   Diagnosis Date    Acute angina     Hyperlipidemia     Hypertension        Past Surgical History:   Procedure Laterality Date    CYST REMOVAL      TUBAL ABDOMINAL LIGATION         Social History     Socioeconomic History    Marital status:    Tobacco Use    Smoking status: Former     Types: Cigarettes    Smokeless tobacco: Never   Vaping Use    Vaping Use: Never used   Substance and Sexual Activity    Alcohol use: Yes     Comment: occasional    Drug use: Never    Sexual activity: Not Currently       Family History   Problem Relation Age of Onset    Liver disease Father     Breast cancer Daughter 52    Dementia Maternal Grandmother     Liver disease Other     Hypertension Other     Heart disease Other     Stroke Other     Heart attack Other     Ovarian cancer Neg Hx           Current Outpatient Medications:     atorvastatin (LIPITOR) 10 MG tablet, Take 1 tablet by mouth Every Night., Disp: 90 tablet, Rfl: 0    clonazePAM (KlonoPIN) 1 MG tablet, Take 1 tablet by mouth Every Night., Disp: , Rfl:     memantine (NAMENDA XR) 28 MG capsule sustained-release 24 hr extended release capsule, Take 1 capsule by mouth Every Morning., Disp: 90 capsule, Rfl: 3    NIFEdipine XL (PROCARDIA XL) 30 MG 24 hr tablet, Take 1 tablet by mouth Daily., Disp: 90 tablet, Rfl: 2     Review of Systems   Psychiatric/Behavioral:  Positive for decreased concentration and dysphoric mood.    All other systems reviewed and are negative.     Objective:  /78   Pulse 74   Ht 152.4 cm (60\")   Wt 62.1 kg (137 lb)   SpO2 100%   BMI 26.76 " kg/mý     Neurologic Exam     Mental Status   Oriented to person, place, and time.   Registration: recalls 3 of 3 objects. Recall of objects at 5 minutes: 0.   Speech: speech is normal   Level of consciousness: alert  Abnormal comprehension.     Cranial Nerves   Cranial nerves II through XII intact.     Motor Exam   Muscle bulk: normal  Overall muscle tone: normal    Strength   Strength 5/5 throughout.     Gait, Coordination, and Reflexes     Gait  Gait: normal    Coordination   Finger to nose coordination: normal    Tremor   Resting tremor: absent  Intention tremor: absent  Action tremor: absent    Reflexes   Right : 2+  Left : 2+    Physical Exam  Constitutional:       Appearance: Normal appearance.   Neurological:      Mental Status: She is alert and oriented to person, place, and time.      Cranial Nerves: Cranial nerves 2-12 are intact.      Motor: Motor strength is normal.      Coordination: Finger-Nose-Finger Test normal.      Gait: Gait is intact.   Psychiatric:         Mood and Affect: Mood is depressed (mild).         Speech: Speech normal.         Behavior: Behavior is slowed.         Thought Content: Thought content normal.         Cognition and Memory: She exhibits impaired recent memory.         Judgment: Judgment normal.     At her last visit in clinic on 09/12/2022, her MMSE was a 27 out of 30, 0 out of 3 for word recall.     Previously, her MMSE was a 23 out of 30.    Today, [place] Cognitive Assessment score is a 20 out of 30 with 0 out of 5 for word recall uncued, 2 out of 5 for word recall cued. She missed points for visuospatial and executive. Naming was 2 out of 3.    Assessment/Plan:       Diagnoses and all orders for this visit:    1. Mild late onset Alzheimer's dementia without behavioral disturbance, psychotic disturbance, mood disturbance, or anxiety (Primary)  -     memantine (NAMENDA XR) 28 MG capsule sustained-release 24 hr extended release capsule; Take 1 capsule by mouth  Every Morning.  Dispense: 90 capsule; Refill: 3  -     Ambulatory Referral to Speech Therapy    2. Mixed hyperlipidemia  -     atorvastatin (LIPITOR) 10 MG tablet; Take 1 tablet by mouth Every Night.  Dispense: 90 tablet; Refill: 0         She is with her daughter during today's visit. We discussed her diagnosis of mild Alzheimer's disease. For now, we have discussed safety with driving and monitoring for any additional concerns.     She will continue the Namenda XR unchanged. We are going to refer her for speech therapy at [facility] for cognitive rehabilitation. We provided them with Alzheimer's caregiver guide, living with Alzheimer's guide, and after visit summary today.     Also encouraged her to establish care with a counselor to help with grief counseling, losing her daughter earlier this year. If she continues to have significant depression, we can certainly prescribe medication. Also encouraged them to reschedule appointment with PCP that she missed.     We will follow up in 7 months in clinic for reevaluation of symptoms or sooner if needed. She and her daughter verbalize understanding and agree with plan moving forward today.    Reviewed medications, potential side effects and signs and symptoms to report. Discussed risk versus benefits of treatment plan with patient and/or family-including medications, labs and radiology that may be ordered. Addressed questions and concerns during visit. Patient and/or family verbalized understanding and agree with plan.    During this visit the following were done:  Labs Reviewed []    Labs Ordered []    Radiology Reports Reviewed []    Radiology Ordered []    PCP Records Reviewed [x]    Referring Provider Records Reviewed []    ER Records Reviewed []    Hospital Records Reviewed []    History Obtained From Family [x]  dtr  Radiology Images Reviewed []    Other Reviewed []    Records Requested []      Note to patient: The 21st Century Cures Act makes  medical notes like these available to patients in the interest of transparency. However, be advised this is a medical document. It is intended as peer to peer communication. It is written in medical language and may contain abbreviations or verbiage that are unfamiliar. It may appear blunt or direct. Medical documents are intended to carry relevant information, facts as evident, and the clinical opinion of the provider.      Transcribed from ambient dictation for JEAN-CLAUDE Wilson by Lexie Medrano.  08/07/23   13:11 EDT    Patient or patient representative verbalized consent to the visit recording.  I have personally performed the services described in this document as transcribed by the above individual, and it is both accurate and complete.  JEAN-CLAUDE Wilson  8/7/2023  13:21 EDT

## 2023-08-07 NOTE — PROGRESS NOTES
Subjective:     Patient ID: Carolyn Snowden is a 73 y.o. female.    CC:   Chief Complaint   Patient presents with    Alzheimer's Disease       HPI:   History of Present Illness  Today 8/7/2023-  This is a 73-year-old female who presents for 1-year neurology follow-up on Alzheimer's with symptoms present since 2020. Her daughter is her primary caretaker and does require LA to bring her to and from appointments and provide care for her. She does have mild Alzheimer's. At her last visit in clinic, she actually drove herself to the visit. She had stopped her medications without notifying our clinic. At the time, we decided to simplify dosing of memantine (Namenda XR) with extended-release dosing. She is here for follow-up and refills on medications today. She is accompanied by her daughter, Magalys Ervin, today. She notes she has continued memory loss and repeating the same things over and over. There have been no concerns of delusions or hallucinations. She does continue to drive, and her daughter has had some concerns with driving.    Today, her daughter reports her mother's short-term memory is about the same as it was at her last visit. She confirms she is still taking Namenda XR and is up to 28 mg daily now. She states she can tell a difference in her memory since starting the medication. She notes when she gets up in the morning, she can do something, but later on that day, she may have forgotten what she did in the morning. Her daughter states her mother will ask her a question, and 3 minutes later, she will ask the same question again. Initially, she would ask the question, and ask it again by the middle of the day. Now, she repeats the question within minutes instead of hours.     She resides with her daughter. She is still driving, and she believes she does well; however, her daughter states it is a level of concern, and it scares her and her family. Her daughter states she does not get lost because she is  "going to the same local places. She traveled to [place] twice by herself, and she did okay with that. When she was in [place], a 15-to-20-minute trip took her an hour because it was an unfamiliar place. The furthest she drives locally is [place], [place]. She notes she goes to the same places, such as the mall, and back to [place], [place]. Her daughter believes she has done okay with this. She does not have an iPhone. She feels very secure driving from [place], [place] to [place], [place], and is planning on driving there one day this week. The last time she drove to [place] was in 04/2023. Her daughter is comfortable with her mother driving to [place] if it is daylight.    She does not believe she is taking atorvastatin (Lipitor) anymore. She was supposed to see JEAN-CLAUDE Lawson, on 08/04/2023 but missed this appointment. She also missed an appointment in 01/2023. She has not seen primary care since 11/2022. Her daughter states she will reschedule her mother's appointment.     She reports she is sleeping well. She adds she has not had any nightmares in quite some time.    Her other daughter passed away on 04/07/2023 from cancer. Her daughter states her mother was doing well going to [facility], [place] similar to the [place], and walking. Since the death of her daughter, she does not have the motivation. Her sister lives in the same neighborhood and comes over every day. Her daughter notes she will allow her mother to grieve, but she will not allow her to stay there and \"wilt away.\" She states when her daughter first passed, she took to her bed.    She denies any big changes in her health, surgeries, or hospitalizations.    She has fallen once in the past year. She fell in the street when she was taking out the trash at night.     Prior neurological workup and history:  She has noted symptoms since at least 2020 marked initially by forgetfulness . This has gradually worsened  over time. Additional symptoms have " included impairments in short term memory . There have been associated  symptoms of depression . She denies  impairments in ADL's. Her family  manages her finances and she managers her medications with a pill planner and sometimes forgets but not often. She continues to drive.  . She is currently residing at home with her daughter. Her daughter is now having to care for her and requires LA to get Mrs. Snowden to and from appointments. We feel that her symptoms are most consistent with Alzheimer's disease with a possible vascular component. 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     Her highest level of education is 2 years of college. She lives with her daughter. She is able to pay bills, take her medications, and fix meals without difficulty. Daughter does have to monitor her medications as she often takes these incorrectly.    She did not tolerate the donepezil due to nightmares. Her nightmares have resolved.     Maternal grandmother had Alzheimer's around age 93 prior to passing away. Her  passed away in 2012 in his sleep. REM sleep disorder 15+ years. Has been on clonazepam 1.5mg at night to treat this-sleep specialist Dr. Emeterio Green. 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 Unknown cause. She can taste. No tremors. No shuffling. No family history of parkinson's disease. 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 Completed 1.5 years of college. Right hand dominant. Challenges with learning new things.     MRI of the brain with and without contrast completed on 11/4/2021 and imaging as well as radiology report reviewed previously in clinic and she does have some mild to moderate chronic small vessel ischemic changes with no acute intracranial abnormalities, mild atrophy of the brain consistent with age.  Compared to MRI of  the brain with and without contrast from March 5, 2018 there are no significant changes.  She has had hemoglobin A1c 5.5% with PCP, ferritin 730, CBC overall normal, iron profile normal, lipid panel with total cholesterol 248, HDL 78 and  and now on low-dose Lipitor, CMP with creatinine 1.04 and otherwise normal.      She has also had vitamin B12 checked and it was 359 and folic acid 20 with methylmalonic acid normal and TSH and free T4 normal.  She has completed cognitive therapy with one of our speech and language pathologist and her RBANS score was a 361 showing moderate to severe impairments in cognition, communication, immediate and delayed memory as well as language.  Her daughter notices trouble with focus, concentration and following more complex directions.  She does sit down with her and they work through her bills together.  There have been some times in the past where she has paid bills twice and not remembered. Her daughter helps with filling her medicine planner monthly. She generally does well with taking the medications but does occasionally miss doses.  Her daughter is not certain that the speech-language therapy has particularly shown improvement in Mrs. Snowden's overall cognitive functioning, but she has really enjoyed going. She has different tools at home to use that were provided by their services. She has recently started some physical therapy to help with her balance as well. Her daughter has noticed some occasional shaking in her mother's hands, usually while she is reaching to grab something, has not noticed a resting tremor.     The following portions of the patient's history were reviewed and updated as appropriate: allergies, current medications, past family history, past medical history, past social history, past surgical history, and problem list.    Past Medical History:   Diagnosis Date    Acute angina     Hyperlipidemia     Hypertension        Past Surgical History:  "  Procedure Laterality Date    CYST REMOVAL      TUBAL ABDOMINAL LIGATION         Social History     Socioeconomic History    Marital status:    Tobacco Use    Smoking status: Former     Types: Cigarettes    Smokeless tobacco: Never   Vaping Use    Vaping Use: Never used   Substance and Sexual Activity    Alcohol use: Yes     Comment: occasional    Drug use: Never    Sexual activity: Not Currently       Family History   Problem Relation Age of Onset    Liver disease Father     Breast cancer Daughter 52    Dementia Maternal Grandmother     Liver disease Other     Hypertension Other     Heart disease Other     Stroke Other     Heart attack Other     Ovarian cancer Neg Hx           Current Outpatient Medications:     atorvastatin (LIPITOR) 10 MG tablet, Take 1 tablet by mouth Every Night., Disp: 90 tablet, Rfl: 0    clonazePAM (KlonoPIN) 1 MG tablet, Take 1 tablet by mouth Every Night., Disp: , Rfl:     memantine (NAMENDA XR) 28 MG capsule sustained-release 24 hr extended release capsule, Take 1 capsule by mouth Every Morning., Disp: 90 capsule, Rfl: 3    NIFEdipine XL (PROCARDIA XL) 30 MG 24 hr tablet, Take 1 tablet by mouth Daily., Disp: 90 tablet, Rfl: 2     Review of Systems   Psychiatric/Behavioral:  Positive for decreased concentration and dysphoric mood.    All other systems reviewed and are negative.     Objective:  /78   Pulse 74   Ht 152.4 cm (60\")   Wt 62.1 kg (137 lb)   SpO2 100%   BMI 26.76 kg/mý     Neurologic Exam     Mental Status   Oriented to person, place, and time.   Registration: recalls 3 of 3 objects. Recall of objects at 5 minutes: 0.   Speech: speech is normal   Level of consciousness: alert  Abnormal comprehension.     Cranial Nerves   Cranial nerves II through XII intact.     Motor Exam   Muscle bulk: normal  Overall muscle tone: normal    Strength   Strength 5/5 throughout.     Gait, Coordination, and Reflexes     Gait  Gait: normal    Coordination   Finger to nose " coordination: normal    Tremor   Resting tremor: absent  Intention tremor: absent  Action tremor: absent    Reflexes   Right : 2+  Left : 2+    Physical Exam  Constitutional:       Appearance: Normal appearance.   Neurological:      Mental Status: She is alert and oriented to person, place, and time.      Cranial Nerves: Cranial nerves 2-12 are intact.      Motor: Motor strength is normal.      Coordination: Finger-Nose-Finger Test normal.      Gait: Gait is intact.   Psychiatric:         Mood and Affect: Mood is depressed (mild).         Speech: Speech normal.         Behavior: Behavior is slowed.         Thought Content: Thought content normal.         Cognition and Memory: She exhibits impaired recent memory.         Judgment: Judgment normal.     At her last visit in clinic on 09/12/2022, her MMSE was a 27 out of 30, 0 out of 3 for word recall.     Previously, her MMSE was a 23 out of 30.    Today, Erik Cognitive Assessment score is a 20 out of 30 with 0 out of 5 for word recall uncued, 2 out of 5 for word recall cued. She missed points for visuospatial and executive. Naming was 2 out of 3.    Assessment/Plan:       Diagnoses and all orders for this visit:    1. Mild late onset Alzheimer's dementia without behavioral disturbance, psychotic disturbance, mood disturbance, or anxiety (Primary)  -     memantine (NAMENDA XR) 28 MG capsule sustained-release 24 hr extended release capsule; Take 1 capsule by mouth Every Morning.  Dispense: 90 capsule; Refill: 3  -     Ambulatory Referral to Speech Therapy    2. Mixed hyperlipidemia  -     atorvastatin (LIPITOR) 10 MG tablet; Take 1 tablet by mouth Every Night.  Dispense: 90 tablet; Refill: 0         She is with her daughter during today's visit. We discussed her diagnosis of mild Alzheimer's disease. For now, we have discussed safety with driving and monitoring for any additional concerns.     She will continue the Namenda XR unchanged. We are going to refer  her for speech therapy at [facility] for [facility] rehabilitation. We provided them with Alzheimer's caregiver guide, living with Alzheimer's guide, and after visit summary today.     Also encouraged her to establish care with a counselor to help with grief counseling, losing her daughter earlier this year. If she continues to have significant depression, we can certainly prescribe medication. Also encouraged them to reschedule appointment with PCP that she missed.     We will follow up in 7 months in clinic for reevaluation of symptoms or sooner if needed. She and her daughter verbalize understanding and agree with plan moving forward today.    Reviewed medications, potential side effects and signs and symptoms to report. Discussed risk versus benefits of treatment plan with patient and/or family-including medications, labs and radiology that may be ordered. Addressed questions and concerns during visit. Patient and/or family verbalized understanding and agree with plan.    During this visit the following were done:  Labs Reviewed []    Labs Ordered []    Radiology Reports Reviewed []    Radiology Ordered []    PCP Records Reviewed [x]    Referring Provider Records Reviewed []    ER Records Reviewed []    Hospital Records Reviewed []    History Obtained From Family [x]  dtr  Radiology Images Reviewed []    Other Reviewed []    Records Requested []      Note to patient: The 21st Century Cures Act makes medical notes like these available to patients in the interest of transparency. However, be advised this is a medical document. It is intended as peer to peer communication. It is written in medical language and may contain abbreviations or verbiage that are unfamiliar. It may appear blunt or direct. Medical documents are intended to carry relevant information, facts as evident, and the clinical opinion of the provider.      Transcribed from ambient dictation for JEAN-CLAUDE Wilson by Lexie  Mitchell.  08/07/23   13:11 EDT    Patient or patient representative verbalized consent to the visit recording.  I have personally performed the services described in this document as transcribed by the above individual, and it is both accurate and complete.  Leola Andrews, APRN  8/7/2023  13:21 EDT

## 2023-08-17 ENCOUNTER — TELEPHONE (OUTPATIENT)
Dept: NEUROLOGY | Facility: CLINIC | Age: 73
End: 2023-08-17

## 2023-08-17 NOTE — TELEPHONE ENCOUNTER
Caller: DYAN DIAZ    Relationship: Emergency Contact    Best call back number: [phone]    Who are you requesting to speak with (clinical staff, provider,  specific staff member): DYAN    What was the call regarding:   PT'S DAUGHTER CALLED WITH INSURANCE INFORMATION.  [facility] MEDICARE SUPP  [id number]  [address] [place], [place]    I TRIED SEVERAL TIME TO ENTER THIS INFORMATION AND KEPT GETTING NOT ELIGIBLE.     PT'S DAUGHTER WILL UPLOAD A COPY OF THE INSURANCE CARD BOTH FRONT AND BACK IN MY CHART.

## 2023-08-18 NOTE — TELEPHONE ENCOUNTER
Tried the [facility] or [facility], and can't get it active either.  Didn't see the new cards uploaded.  Was going to ask if [employer] is still active.  I called the number left, and left a message.

## 2023-08-21 ENCOUNTER — OFFICE VISIT (OUTPATIENT)
Dept: INTERNAL MEDICINE | Facility: CLINIC | Age: 73
End: 2023-08-21

## 2023-08-21 ENCOUNTER — LAB (OUTPATIENT)
Dept: INTERNAL MEDICINE | Facility: CLINIC | Age: 73
End: 2023-08-21
Payer: COMMERCIAL

## 2023-08-21 VITALS
OXYGEN SATURATION: 99 % | HEART RATE: 78 BPM | WEIGHT: 134 LBS | TEMPERATURE: 98.1 F | SYSTOLIC BLOOD PRESSURE: 120 MMHG | DIASTOLIC BLOOD PRESSURE: 78 MMHG | HEIGHT: 60 IN | BODY MASS INDEX: 26.31 KG/M2

## 2023-08-21 DIAGNOSIS — Z12.31 ENCOUNTER FOR SCREENING MAMMOGRAM FOR MALIGNANT NEOPLASM OF BREAST: ICD-10-CM

## 2023-08-21 DIAGNOSIS — E78.2 MIXED HYPERLIPIDEMIA: ICD-10-CM

## 2023-08-21 DIAGNOSIS — I10 ESSENTIAL HYPERTENSION: Primary | ICD-10-CM

## 2023-08-21 DIAGNOSIS — G30.9 ALZHEIMER DISEASE: ICD-10-CM

## 2023-08-21 DIAGNOSIS — F02.80 ALZHEIMER DISEASE: ICD-10-CM

## 2023-08-21 DIAGNOSIS — F43.21 GRIEF: ICD-10-CM

## 2023-08-21 DIAGNOSIS — Z78.0 POSTMENOPAUSAL: ICD-10-CM

## 2023-08-21 DIAGNOSIS — F32.9 REACTIVE DEPRESSION (SITUATIONAL): ICD-10-CM

## 2023-08-21 LAB
ALBUMIN SERPL-MCNC: 4.1 G/DL (ref 3.5–5.2)
ALBUMIN/GLOB SERPL: 1.6 G/DL
ALP SERPL-CCNC: 51 U/L (ref 39–117)
ALT SERPL W P-5'-P-CCNC: 15 U/L (ref 1–33)
ANION GAP SERPL CALCULATED.3IONS-SCNC: 11 MMOL/L (ref 5–15)
AST SERPL-CCNC: 22 U/L (ref 1–32)
BASOPHILS # BLD AUTO: 0.05 10*3/MM3 (ref 0–0.2)
BASOPHILS NFR BLD AUTO: 1.2 % (ref 0–1.5)
BILIRUB SERPL-MCNC: 0.5 MG/DL (ref 0–1.2)
BUN SERPL-MCNC: 17 MG/DL (ref 8–23)
BUN/CREAT SERPL: 18.5 (ref 7–25)
CALCIUM SPEC-SCNC: 9.5 MG/DL (ref 8.6–10.5)
CHLORIDE SERPL-SCNC: 103 MMOL/L (ref 98–107)
CHOLEST SERPL-MCNC: 164 MG/DL (ref 0–200)
CO2 SERPL-SCNC: 24 MMOL/L (ref 22–29)
CREAT SERPL-MCNC: 0.92 MG/DL (ref 0.57–1)
DEPRECATED RDW RBC AUTO: 39.8 FL (ref 37–54)
EGFRCR SERPLBLD CKD-EPI 2021: 65.1 ML/MIN/1.73
EOSINOPHIL # BLD AUTO: 0.07 10*3/MM3 (ref 0–0.4)
EOSINOPHIL NFR BLD AUTO: 1.6 % (ref 0.3–6.2)
ERYTHROCYTE [DISTWIDTH] IN BLOOD BY AUTOMATED COUNT: 12.9 % (ref 12.3–15.4)
GLOBULIN UR ELPH-MCNC: 2.6 GM/DL
GLUCOSE SERPL-MCNC: 91 MG/DL (ref 65–99)
HCT VFR BLD AUTO: 37.7 % (ref 34–46.6)
HDLC SERPL-MCNC: 78 MG/DL (ref 40–60)
HGB BLD-MCNC: 12.1 G/DL (ref 12–15.9)
IMM GRANULOCYTES # BLD AUTO: 0.01 10*3/MM3 (ref 0–0.05)
IMM GRANULOCYTES NFR BLD AUTO: 0.2 % (ref 0–0.5)
LDLC SERPL CALC-MCNC: 74 MG/DL (ref 0–100)
LDLC/HDLC SERPL: 0.95 {RATIO}
LYMPHOCYTES # BLD AUTO: 2.16 10*3/MM3 (ref 0.7–3.1)
LYMPHOCYTES NFR BLD AUTO: 50.7 % (ref 19.6–45.3)
MCH RBC QN AUTO: 27.5 PG (ref 26.6–33)
MCHC RBC AUTO-ENTMCNC: 32.1 G/DL (ref 31.5–35.7)
MCV RBC AUTO: 85.7 FL (ref 79–97)
MONOCYTES # BLD AUTO: 0.41 10*3/MM3 (ref 0.1–0.9)
MONOCYTES NFR BLD AUTO: 9.6 % (ref 5–12)
NEUTROPHILS NFR BLD AUTO: 1.56 10*3/MM3 (ref 1.7–7)
NEUTROPHILS NFR BLD AUTO: 36.7 % (ref 42.7–76)
NRBC BLD AUTO-RTO: 0 /100 WBC (ref 0–0.2)
PLATELET # BLD AUTO: 224 10*3/MM3 (ref 140–450)
PMV BLD AUTO: 11.9 FL (ref 6–12)
POTASSIUM SERPL-SCNC: 3.6 MMOL/L (ref 3.5–5.2)
PROT SERPL-MCNC: 6.7 G/DL (ref 6–8.5)
RBC # BLD AUTO: 4.4 10*6/MM3 (ref 3.77–5.28)
SODIUM SERPL-SCNC: 138 MMOL/L (ref 136–145)
TRIGL SERPL-MCNC: 59 MG/DL (ref 0–150)
VLDLC SERPL-MCNC: 12 MG/DL (ref 5–40)
WBC NRBC COR # BLD: 4.26 10*3/MM3 (ref 3.4–10.8)

## 2023-08-21 PROCEDURE — 85025 COMPLETE CBC W/AUTO DIFF WBC: CPT | Performed by: NURSE PRACTITIONER

## 2023-08-21 PROCEDURE — 80053 COMPREHEN METABOLIC PANEL: CPT | Performed by: NURSE PRACTITIONER

## 2023-08-21 PROCEDURE — 36415 COLL VENOUS BLD VENIPUNCTURE: CPT | Performed by: NURSE PRACTITIONER

## 2023-08-21 PROCEDURE — 1160F RVW MEDS BY RX/DR IN RCRD: CPT | Performed by: NURSE PRACTITIONER

## 2023-08-21 PROCEDURE — 1159F MED LIST DOCD IN RCRD: CPT | Performed by: NURSE PRACTITIONER

## 2023-08-21 PROCEDURE — 80061 LIPID PANEL: CPT | Performed by: NURSE PRACTITIONER

## 2023-08-21 PROCEDURE — 3074F SYST BP LT 130 MM HG: CPT | Performed by: NURSE PRACTITIONER

## 2023-08-21 PROCEDURE — 99214 OFFICE O/P EST MOD 30 MIN: CPT | Performed by: NURSE PRACTITIONER

## 2023-08-21 PROCEDURE — 3078F DIAST BP <80 MM HG: CPT | Performed by: NURSE PRACTITIONER

## 2023-08-21 RX ORDER — ATORVASTATIN CALCIUM 10 MG/1
10 TABLET, FILM COATED ORAL NIGHTLY
Qty: 90 TABLET | Refills: 2 | Status: SHIPPED | OUTPATIENT
Start: 2023-08-21

## 2023-08-21 RX ORDER — NIFEDIPINE 30 MG/1
30 TABLET, EXTENDED RELEASE ORAL DAILY
Qty: 90 TABLET | Refills: 2 | Status: SHIPPED | OUTPATIENT
Start: 2023-08-21

## 2023-08-21 NOTE — PROGRESS NOTES
"Chief Complaint   Patient presents with    Follow-up    Alzheimer's Disease    Hypertension       HPI  Carolyn Snowden is a 73 y.o. female presents for follow-up on Alzheimers and HTN.  Accompanied by her daughter.  Pt states that she lost her oldest daughter to cancer in April.  Has been suffering from depression.  Her daughter reports that the pt doesn't eat much and lays in bed all day.  She has rejoined Jainism though.  She doesn't like to socialize much anymore because she is afraid that she will repeat questions over and over.  She denies any suicidal thoughts.  She does not want medication for the depression, she states \"I believe that god will heal me.\"  Her daughter is requesting she see a grief counselor.  She states that she is still taking all her medications.    Still driving short distances.     The following portions of the patient's history were reviewed and updated as appropriate: allergies, current medications, past family history, past medical history, past social history, past surgical history, and problem list.    Subjective  Review of Systems   Constitutional:  Negative for activity change, appetite change and fatigue.   HENT:  Negative for congestion.    Respiratory:  Negative for cough and shortness of breath.    Cardiovascular:  Negative for chest pain and leg swelling.   Gastrointestinal:  Negative for abdominal pain.   Musculoskeletal:  Positive for arthralgias.   Neurological:  Positive for memory problem. Negative for dizziness, weakness and confusion.   Psychiatric/Behavioral:  Positive for depressed mood. Negative for behavioral problems, decreased concentration and suicidal ideas.      Objective  Visit Vitals  /78 (BP Location: Left arm, Patient Position: Sitting)   Pulse 78   Temp 98.1 øF (36.7 øC)   Ht 152.4 cm (60\")   Wt 60.8 kg (134 lb)   SpO2 99%   BMI 26.17 kg/mý        Physical Exam  Vitals and nursing note reviewed.   HENT:      Head: Normocephalic.   Eyes:      " Pupils: Pupils are equal, round, and reactive to light.   Cardiovascular:      Rate and Rhythm: Normal rate and regular rhythm.      Pulses: Normal pulses.      Heart sounds: Normal heart sounds. No murmur heard.  Pulmonary:      Effort: Pulmonary effort is normal.      Breath sounds: Normal breath sounds.   Skin:     General: Skin is warm and dry.      Capillary Refill: Capillary refill takes less than 2 seconds.   Neurological:      General: No focal deficit present.      Mental Status: She is alert and oriented to person, place, and time.      Gait: Gait is intact.   Psychiatric:         Attention and Perception: Attention normal.         Mood and Affect: Mood is depressed. Affect is flat.         Behavior: Behavior normal.         Thought Content: Thought content normal.         Judgment: Judgment normal.        Procedures     Assessment and Plan  Diagnoses and all orders for this visit:    1. Essential hypertension (Primary)  -     NIFEdipine XL (PROCARDIA XL) 30 MG 24 hr tablet; Take 1 tablet by mouth Daily.  Dispense: 90 tablet; Refill: 2    2. Alzheimer disease    3. Grief  -     Ambulatory Referral to Behavioral Health    4. Reactive depression (situational)  -     Ambulatory Referral to Behavioral Health    5. Mixed hyperlipidemia  -     atorvastatin (LIPITOR) 10 MG tablet; Take 1 tablet by mouth Every Night.  Dispense: 90 tablet; Refill: 2  -     CBC & Differential  -     Comprehensive Metabolic Panel  -     Lipid Panel    6. Encounter for screening mammogram for malignant neoplasm of breast  -     Mammo Screening Digital Tomosynthesis Bilateral With CAD; Future    7. Postmenopausal  -     DEXA Bone Density Axial; Future    HTN is stable on Nifedipine  Refer to behavior health for counseling  Check labs today  Order Mammo and Dexa  Cont with neuro  Pt denies SI    Return in about 6 months (around 2/21/2024) for HTN.        JEAN-CLAUDE Lindsey

## 2024-02-20 ENCOUNTER — TELEPHONE (OUTPATIENT)
Dept: INTERNAL MEDICINE | Facility: CLINIC | Age: 74
End: 2024-02-20

## 2024-02-20 NOTE — TELEPHONE ENCOUNTER
Hub can read and reschedule:    Called patient to reschedule 2/21 appointment as provider won't be in the office that morning.

## 2024-03-04 ENCOUNTER — OFFICE VISIT (OUTPATIENT)
Dept: NEUROLOGY | Facility: CLINIC | Age: 74
End: 2024-03-04
Payer: MEDICAID

## 2024-03-04 VITALS
SYSTOLIC BLOOD PRESSURE: 120 MMHG | DIASTOLIC BLOOD PRESSURE: 80 MMHG | HEART RATE: 91 BPM | WEIGHT: 134 LBS | BODY MASS INDEX: 25.3 KG/M2 | OXYGEN SATURATION: 98 % | HEIGHT: 61 IN

## 2024-03-04 DIAGNOSIS — G30.1 MILD LATE ONSET ALZHEIMER'S DEMENTIA WITHOUT BEHAVIORAL DISTURBANCE, PSYCHOTIC DISTURBANCE, MOOD DISTURBANCE, OR ANXIETY: ICD-10-CM

## 2024-03-04 DIAGNOSIS — F32.A ANXIETY AND DEPRESSION: Primary | ICD-10-CM

## 2024-03-04 DIAGNOSIS — F02.A0 MILD LATE ONSET ALZHEIMER'S DEMENTIA WITHOUT BEHAVIORAL DISTURBANCE, PSYCHOTIC DISTURBANCE, MOOD DISTURBANCE, OR ANXIETY: ICD-10-CM

## 2024-03-04 DIAGNOSIS — F41.9 ANXIETY AND DEPRESSION: Primary | ICD-10-CM

## 2024-03-04 PROCEDURE — 99213 OFFICE O/P EST LOW 20 MIN: CPT | Performed by: NURSE PRACTITIONER

## 2024-03-04 RX ORDER — MEMANTINE HYDROCHLORIDE 28 MG/1
28 CAPSULE, EXTENDED RELEASE ORAL NIGHTLY
Qty: 90 CAPSULE | Refills: 3 | Status: SHIPPED | OUTPATIENT
Start: 2024-03-04

## 2024-03-04 RX ORDER — SERTRALINE HYDROCHLORIDE 25 MG/1
25 TABLET, FILM COATED ORAL EVERY MORNING
Qty: 90 TABLET | Refills: 3 | Status: SHIPPED | OUTPATIENT
Start: 2024-03-04 | End: 2025-03-04

## 2024-03-04 NOTE — LETTER
March 4, 2024     JEAN-CLAUDE Lindsey  [address]  [address]  [place] [id number]    Patient: Carolyn Snowden   YOB: 1950   Date of Visit: 3/4/2024       Dear JEAN-CLAUDE Lindsey    Carolyn Snowden was in my office today. Below is a copy of my note.    If you have questions, please do not hesitate to call me. I look forward to following Carolyn along with you.         Sincerely,        JEAN-CLAUDE Wilson        CC: Emeterio Green MD    Subjective:     Patient ID: Carolyn Snowden is a 73 y.o. female.    CC:   Chief Complaint   Patient presents with   • Alzheimer's Disease       HPI:   History of Present Illness  Today 3/4/2024- This is a 73-year-old female who I last saw on 08/07/2023. She is accompanied by her daughter during today's visit, who is her primary caretaker. She is on Namenda XR 28 mg daily. We referred her to speech therapy for cognitive rehab at [facility] last visit. We did receive a letter from them stating that the patient was not willing to attend the speech therapy. She is here for reevaluation and refills on medicines today.    She denies any changes in her health, surgeries, or hospital visits since her last visit. Her daughter states she found out she has to move because the owner of the condominium that they stay in is moving back, which has been stressful for her. She notes that her daughter lives with her.    She states she has not gone to speech therapy due to insurance and Medicare issues. Once she gets those figured out, she will reschedule.     Her daughter states her short-term memory has extremely declined since her last visit. They report it has been gradually worsening over the past 6 months. Her daughter states that before, she would ask a question and then an hour or 2 later, she would ask again, but now she is asking the same question only a couple of minutes later. She is not equipped to do her bills on her own.  "She states she has backed off driving unless she knows exactly where she is going. Her daughter states that she does not feel very comfortable driving anymore. She drives to [place] and comes back home. She does not go on road trips. She is drinking plenty of water. She states her appetite has been horrible. She has been taking Prevagen over the counter which she started a few months ago on her own. She states she feels like she is floating when she is walking. She has not had any falls since her last visit.    Her daughter states her memory issues have caused some depression due to her frustration when she does not remember things. She states she is feeling sad and down. Her daughter states that she has been saying that she is \"tired and ready for the Lord to come and get her.\" She has never been treated for depression before. She denies suicidal ideations or tendencies. She does notice more anxiety as well. Symptoms worsened after her other daughter passed away from Cancer in April of 2023.    She is on clonazepam every night for REM sleep and is wondering if that has anything to do with her memory issues. She states she is getting good rest with the clonazepam, but it is making her lightheaded and throwing her balance off. She has been on this for many years. This is prescribed by her Psychiatry provider Emeterio Green.    Her daughter reports she has a tremor in her hands. This is usually when she is writing or holding a cup. No resting tremors noted.     Prior neurological workup and history:  She has noted symptoms since at least 2020 marked initially by forgetfulness . This has gradually worsened  over time. Additional symptoms have included impairments in short term memory . There have been associated  symptoms of depression . She denies  impairments in ADL's. Her family  manages her finances and she managers her medications with a pill planner and sometimes forgets but not often. She continues to drive. She " is currently residing at home with her daughter. Her daughter is now having to care for her and requires [facility] to get Mrs. Snowden to and from appointments.     We feel that her symptoms are most consistent with Alzheimer's disease with a possible vascular component. 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      Her highest level of education is 2 years of college. She lives with her daughter. She is able to pay bills, take her medications, and fix meals without difficulty. Daughter does have to monitor her medications as she often takes these incorrectly.     She did not tolerate the donepezil due to nightmares. Her nightmares have resolved.     Maternal grandmother had Alzheimer's around age 93 prior to passing away. Her  passed away in 2012 in his sleep. REM sleep disorder 15+ years. Has been on clonazepam 1.5mg at night to treat this-sleep specialist Dr. Emeterio Green. 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 Unknown cause. She can taste. No tremors. No shuffling. No family history of parkinson's disease. 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 Completed 1.5 years of college. Right hand dominant. Challenges with learning new things.     MRI of the brain with and without contrast completed on 11/4/2021 and imaging as well as radiology report reviewed previously in clinic and she does have some mild to moderate chronic small vessel ischemic changes with no acute intracranial abnormalities, mild atrophy of the brain consistent with age.  Compared to MRI of the brain with and without contrast from March 5, 2018 there are no significant changes.  She has had hemoglobin A1c 5.5% with PCP, ferritin 730, CBC overall normal, iron profile normal, lipid panel with total cholesterol 248, HDL 78 and  and now on low-dose Lipitor, CMP  with creatinine 1.04 and otherwise normal.      She has also had vitamin B12 checked and it was 359 and folic acid 20 with methylmalonic acid normal and TSH and free T4 normal.      She has completed cognitive therapy with one of our speech and language pathologist and her RBANS score was a 361 showing moderate to severe impairments in cognition, communication, immediate and delayed memory as well as language.  Her daughter notices trouble with focus, concentration and following more complex directions.      Daughter now pays bills and manages medications.    Alzheimer's with symptoms present since 2020.      Short term memory has worsened.     She only drives to [employer] in her home town, but does not drive elsewhere anymore.     Her other daughter passed away on 04/07/2023 from cancer.     The following portions of the patient's history were reviewed and updated as appropriate: allergies, current medications, past family history, past medical history, past social history, past surgical history, and problem list.    Past Medical History:   Diagnosis Date   • Acute angina    • Hyperlipidemia    • Hypertension        Past Surgical History:   Procedure Laterality Date   • CYST REMOVAL     • TUBAL ABDOMINAL LIGATION         Social History     Socioeconomic History   • Marital status:    Tobacco Use   • Smoking status: Former     Types: Cigarettes   • Smokeless tobacco: Never   Vaping Use   • Vaping status: Never Used   Substance and Sexual Activity   • Alcohol use: Yes     Comment: occasional   • Drug use: Never   • Sexual activity: Not Currently       Family History   Problem Relation Age of Onset   • Liver disease Father    • Breast cancer Daughter 52   • Dementia Maternal Grandmother    • Liver disease Other    • Hypertension Other    • Heart disease Other    • Stroke Other    • Heart attack Other    • Ovarian cancer Neg Hx           Current Outpatient Medications:   •  atorvastatin (LIPITOR) 10 MG tablet, Take  "1 tablet by mouth Every Night., Disp: 90 tablet, Rfl: 2  •  clonazePAM (KlonoPIN) 1 MG tablet, Take 1 tablet by mouth Every Night., Disp: , Rfl:   •  memantine (NAMENDA XR) 28 MG capsule sustained-release 24 hr extended release capsule, Take 1 capsule by mouth Every Night., Disp: 90 capsule, Rfl: 3  •  sertraline (Zoloft) 25 MG tablet, Take 1 tablet by mouth Every Morning., Disp: 90 tablet, Rfl: 3     Review of Systems   Neurological:         \"Feels off in head\"   Psychiatric/Behavioral:  Positive for confusion, decreased concentration, dysphoric mood and sleep disturbance. The patient is nervous/anxious.    All other systems reviewed and are negative.       Objective:  /80   Pulse 91   Ht 154.9 cm (61\")   Wt 60.8 kg (134 lb)   SpO2 98%   BMI 25.32 kg/m²     Neurologic Exam     Mental Status   Oriented to person, place, and time.   Speech: speech is normal   Level of consciousness: alert  Knowledge: poor.   Abnormal comprehension.     Cranial Nerves   Cranial nerves II through XII intact.     Motor Exam   Muscle bulk: normal  Overall muscle tone: normal    Strength   Strength 5/5 throughout.     Gait, Coordination, and Reflexes     Gait  Gait: (mild antalgia, no assistive device)    Coordination   Finger to nose coordination: normal    Tremor   Resting tremor: absent  Intention tremor: present (mild BUE fine kinetic tremor, mild ET tremor with spiral right handed)  Action tremor: absent    Reflexes   Right : 2+  Left : 2+      Physical Exam  Constitutional:       Appearance: Normal appearance.   Neurological:      Mental Status: She is alert and oriented to person, place, and time.      Cranial Nerves: Cranial nerves 2-12 are intact.      Motor: Motor strength is normal.     Coordination: Finger-Nose-Finger Test normal.   Psychiatric:         Mood and Affect: Mood is depressed.         Speech: Speech normal.         Behavior: Behavior is slowed.         Cognition and Memory: She exhibits impaired " recent memory.         Judgment: Judgment is inappropriate.       At her last visit in clinic on 08/07/2023, her [place] Cognitive Assessment score was a 20 out of 30, 0 out of 5 for word recall uncued, and 2 out of 5 word recall cued.     Today, her MoCA is a 17 out of 30, 0 out 5 for word recall uncued, 2 out of 5 for word recall cued. She missed points for fluency, serial subtraction, visuospatial executive, and naming. She is completely oriented.     Assessment/Plan:       Diagnoses and all orders for this visit:    1. Anxiety and depression (Primary)  -     sertraline (Zoloft) 25 MG tablet; Take 1 tablet by mouth Every Morning.  Dispense: 90 tablet; Refill: 3    2. Mild late onset Alzheimer's dementia without behavioral disturbance, psychotic disturbance, mood disturbance, or anxiety  Comments:  progressively worsening  Orders:  -     memantine (NAMENDA XR) 28 MG capsule sustained-release 24 hr extended release capsule; Take 1 capsule by mouth Every Night.  Dispense: 90 capsule; Refill: 3  -     sertraline (Zoloft) 25 MG tablet; Take 1 tablet by mouth Every Morning.  Dispense: 90 tablet; Refill: 3    She has had progression of her Alzheimer's-mild to moderate cognitive impairment. We could consider a trial of rivastigmine or galantamine in the future at her next appointment.     She is also experiencing some significant anxiety and depression with additional stressors. I have recommended a trial of sertraline low dose 25 mg daily for now. She and her daughter are aware that if she experiences any adverse effects or worsening of symptoms, to discontinue immediately and contact us.     We will monitor her tremor for now, which appears to be essential.    I printed off an after-visit summary with Alzheimer's caregiver guide and living with Alzheimer's guide.    She will follow up in 6 to 7 months.     She and her daughter verbalized understanding and agree with the plan.     Reviewed medications, potential side  effects and signs and symptoms to report. Discussed risk versus benefits of treatment plan with patient and/or family-including medications, labs and radiology that may be ordered. Addressed questions and concerns during visit. Patient and/or family verbalized understanding and agree with plan.    During this visit the following were done:  Labs Reviewed []    Labs Ordered []    Radiology Reports Reviewed []    Radiology Ordered []    PCP Records Reviewed []    Referring Provider Records Reviewed []    ER Records Reviewed []    Hospital Records Reviewed []    History Obtained From Family [x]    Radiology Images Reviewed []    Other Reviewed []    Records Requested []      Transcribed from ambient dictation for JEAN-CLAUDE Wilson by Jacqueline Jarrett.  03/04/24   14:04 EST    Patient or patient representative verbalized consent to the visit recording.  I have personally performed the services described in this document as transcribed by the above individual, and it is both accurate and complete.  JEAN-CLAUDE Wilson  3/4/2024  16:22 EST    Note to patient: The 21st Century Cures Act makes medical notes like these available to patients in the interest of transparency. However, be advised this is a medical document. It is intended as peer to peer communication. It is written in medical language and may contain abbreviations or verbiage that are unfamiliar. It may appear blunt or direct. Medical documents are intended to carry relevant information, facts as evident, and the clinical opinion of the provider.

## 2024-03-04 NOTE — PROGRESS NOTES
Subjective:     Patient ID: Carolyn Snowden is a 73 y.o. female.    CC:   Chief Complaint   Patient presents with    Alzheimer's Disease       HPI:   History of Present Illness  Today 3/4/2024- This is a 73-year-old female who I last saw on 08/07/2023. She is accompanied by her daughter during today's visit, who is her primary caretaker. She is on Namenda XR 28 mg daily. We referred her to speech therapy for cognitive rehab at [facility] last visit. We did receive a letter from them stating that the patient was not willing to attend the speech therapy. She is here for reevaluation and refills on medicines today.    She denies any changes in her health, surgeries, or hospital visits since her last visit. Her daughter states she found out she has to move because the owner of the condominium that they stay in is moving back, which has been stressful for her. She notes that her daughter lives with her.    She states she has not gone to speech therapy due to insurance and Medicare issues. Once she gets those figured out, she will reschedule.     Her daughter states her short-term memory has extremely declined since her last visit. They report it has been gradually worsening over the past 6 months. Her daughter states that before, she would ask a question and then an hour or 2 later, she would ask again, but now she is asking the same question only a couple of minutes later. She is not equipped to do her bills on her own. She states she has backed off driving unless she knows exactly where she is going. Her daughter states that she does not feel very comfortable driving anymore. She drives to [place] and comes back home. She does not go on road trips. She is drinking plenty of water. She states her appetite has been horrible. She has been taking Prevagen over the counter which she started a few months ago on her own. She states she feels like she is floating when she is walking. She has not had any  "falls since her last visit.    Her daughter states her memory issues have caused some depression due to her frustration when she does not remember things. She states she is feeling sad and down. Her daughter states that she has been saying that she is \"tired and ready for the Lord to come and get her.\" She has never been treated for depression before. She denies suicidal ideations or tendencies. She does notice more anxiety as well. Symptoms worsened after her other daughter passed away from Cancer in April of 2023.    She is on clonazepam every night for REM sleep and is wondering if that has anything to do with her memory issues. She states she is getting good rest with the clonazepam, but it is making her lightheaded and throwing her balance off. She has been on this for many years. This is prescribed by her Psychiatry provider Emeterio Green.    Her daughter reports she has a tremor in her hands. This is usually when she is writing or holding a cup. No resting tremors noted.     Prior neurological workup and history:  She has noted symptoms since at least 2020 marked initially by forgetfulness . This has gradually worsened  over time. Additional symptoms have included impairments in short term memory . There have been associated  symptoms of depression . She denies  impairments in ADL's. Her family  manages her finances and she managers her medications with a pill planner and sometimes forgets but not often. She continues to drive. She is currently residing at home with her daughter. Her daughter is now having to care for her and requires [facility] to get Mrs. Snowden to and from appointments.     We feel that her symptoms are most consistent with Alzheimer's disease with a possible vascular component. 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      Her highest level of " education is 2 years of college. She lives with her daughter. She is able to pay bills, take her medications, and fix meals without difficulty. Daughter does have to monitor her medications as she often takes these incorrectly.     She did not tolerate the donepezil due to nightmares. Her nightmares have resolved.     Maternal grandmother had Alzheimer's around age 93 prior to passing away. Her  passed away in 2012 in his sleep. REM sleep disorder 15+ years. Has been on clonazepam 1.5mg at night to treat this-sleep specialist Dr. Emeterio Green. 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 Unknown cause. She can taste. No tremors. No shuffling. No family history of parkinson's disease. 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 Completed 1.5 years of college. Right hand dominant. Challenges with learning new things.     MRI of the brain with and without contrast completed on 11/4/2021 and imaging as well as radiology report reviewed previously in clinic and she does have some mild to moderate chronic small vessel ischemic changes with no acute intracranial abnormalities, mild atrophy of the brain consistent with age.  Compared to MRI of the brain with and without contrast from March 5, 2018 there are no significant changes.  She has had hemoglobin A1c 5.5% with PCP, ferritin 730, CBC overall normal, iron profile normal, lipid panel with total cholesterol 248, HDL 78 and  and now on low-dose Lipitor, CMP with creatinine 1.04 and otherwise normal.      She has also had vitamin B12 checked and it was 359 and folic acid 20 with methylmalonic acid normal and TSH and free T4 normal.      She has completed cognitive therapy with one of our speech and language pathologist and her RBANS score was a 361 showing moderate to severe impairments in cognition, communication, immediate and delayed memory as well as language.  Her daughter notices trouble with focus, concentration and following  "more complex directions.      Daughter now pays bills and manages medications.    Alzheimer's with symptoms present since 2020.      Short term memory has worsened.     She only drives to [place] in her home town, but does not drive elsewhere anymore.     Her other daughter passed away on 04/07/2023 from cancer.     The following portions of the patient's history were reviewed and updated as appropriate: allergies, current medications, past family history, past medical history, past social history, past surgical history, and problem list.    Past Medical History:   Diagnosis Date    Acute angina     Hyperlipidemia     Hypertension        Past Surgical History:   Procedure Laterality Date    CYST REMOVAL      TUBAL ABDOMINAL LIGATION         Social History     Socioeconomic History    Marital status:    Tobacco Use    Smoking status: Former     Types: Cigarettes    Smokeless tobacco: Never   Vaping Use    Vaping status: Never Used   Substance and Sexual Activity    Alcohol use: Yes     Comment: occasional    Drug use: Never    Sexual activity: Not Currently       Family History   Problem Relation Age of Onset    Liver disease Father     Breast cancer Daughter 52    Dementia Maternal Grandmother     Liver disease Other     Hypertension Other     Heart disease Other     Stroke Other     Heart attack Other     Ovarian cancer Neg Hx           Current Outpatient Medications:     atorvastatin (LIPITOR) 10 MG tablet, Take 1 tablet by mouth Every Night., Disp: 90 tablet, Rfl: 2    clonazePAM (KlonoPIN) 1 MG tablet, Take 1 tablet by mouth Every Night., Disp: , Rfl:     memantine (NAMENDA XR) 28 MG capsule sustained-release 24 hr extended release capsule, Take 1 capsule by mouth Every Night., Disp: 90 capsule, Rfl: 3    sertraline (Zoloft) 25 MG tablet, Take 1 tablet by mouth Every Morning., Disp: 90 tablet, Rfl: 3     Review of Systems   Neurological:         \"Feels off in head\"   Psychiatric/Behavioral:  Positive for " "confusion, decreased concentration, dysphoric mood and sleep disturbance. The patient is nervous/anxious.    All other systems reviewed and are negative.       Objective:  /80   Pulse 91   Ht 154.9 cm (61\")   Wt 60.8 kg (134 lb)   SpO2 98%   BMI 25.32 kg/m²     Neurologic Exam     Mental Status   Oriented to person, place, and time.   Speech: speech is normal   Level of consciousness: alert  Knowledge: poor.   Abnormal comprehension.     Cranial Nerves   Cranial nerves II through XII intact.     Motor Exam   Muscle bulk: normal  Overall muscle tone: normal    Strength   Strength 5/5 throughout.     Gait, Coordination, and Reflexes     Gait  Gait: (mild antalgia, no assistive device)    Coordination   Finger to nose coordination: normal    Tremor   Resting tremor: absent  Intention tremor: present (mild BUE fine kinetic tremor, mild ET tremor with spiral right handed)  Action tremor: absent    Reflexes   Right : 2+  Left : 2+      Physical Exam  Constitutional:       Appearance: Normal appearance.   Neurological:      Mental Status: She is alert and oriented to person, place, and time.      Cranial Nerves: Cranial nerves 2-12 are intact.      Motor: Motor strength is normal.     Coordination: Finger-Nose-Finger Test normal.   Psychiatric:         Mood and Affect: Mood is depressed.         Speech: Speech normal.         Behavior: Behavior is slowed.         Cognition and Memory: She exhibits impaired recent memory.         Judgment: Judgment is inappropriate.       At her last visit in clinic on 08/07/2023, her Erik Cognitive Assessment score was a 20 out of 30, 0 out of 5 for word recall uncued, and 2 out of 5 word recall cued.     Today, her MoCA is a 17 out of 30, 0 out 5 for word recall uncued, 2 out of 5 for word recall cued. She missed points for fluency, serial subtraction, visuospatial executive, and naming. She is completely oriented.     Assessment/Plan:       Diagnoses and all orders " for this visit:    1. Anxiety and depression (Primary)  -     sertraline (Zoloft) 25 MG tablet; Take 1 tablet by mouth Every Morning.  Dispense: 90 tablet; Refill: 3    2. Mild late onset Alzheimer's dementia without behavioral disturbance, psychotic disturbance, mood disturbance, or anxiety  Comments:  progressively worsening  Orders:  -     memantine (NAMENDA XR) 28 MG capsule sustained-release 24 hr extended release capsule; Take 1 capsule by mouth Every Night.  Dispense: 90 capsule; Refill: 3  -     sertraline (Zoloft) 25 MG tablet; Take 1 tablet by mouth Every Morning.  Dispense: 90 tablet; Refill: 3    She has had progression of her Alzheimer's-mild to moderate cognitive impairment. We could consider a trial of rivastigmine or galantamine in the future at her next appointment.     She is also experiencing some significant anxiety and depression with additional stressors. I have recommended a trial of sertraline low dose 25 mg daily for now. She and her daughter are aware that if she experiences any adverse effects or worsening of symptoms, to discontinue immediately and contact us.     We will monitor her tremor for now, which appears to be essential.    I printed off an after-visit summary with Alzheimer's caregiver guide and living with Alzheimer's guide.    She will follow up in 6 to 7 months.     She and her daughter verbalized understanding and agree with the plan.     Reviewed medications, potential side effects and signs and symptoms to report. Discussed risk versus benefits of treatment plan with patient and/or family-including medications, labs and radiology that may be ordered. Addressed questions and concerns during visit. Patient and/or family verbalized understanding and agree with plan.    During this visit the following were done:  Labs Reviewed []    Labs Ordered []    Radiology Reports Reviewed []    Radiology Ordered []    PCP Records Reviewed []    Referring Provider Records Reviewed []     ER Records Reviewed []    Hospital Records Reviewed []    History Obtained From Family [x]    Radiology Images Reviewed []    Other Reviewed []    Records Requested []      Transcribed from ambient dictation for JEAN-CLAUDE Wilson by Jacqueline Jarrett.  03/04/24   14:04 EST    Patient or patient representative verbalized consent to the visit recording.  I have personally performed the services described in this document as transcribed by the above individual, and it is both accurate and complete.  JEAN-CLAUDE Wilson  3/4/2024  16:22 EST    Note to patient: The 21st Century Cures Act makes medical notes like these available to patients in the interest of transparency. However, be advised this is a medical document. It is intended as peer to peer communication. It is written in medical language and may contain abbreviations or verbiage that are unfamiliar. It may appear blunt or direct. Medical documents are intended to carry relevant information, facts as evident, and the clinical opinion of the provider.

## 2024-03-08 ENCOUNTER — PATIENT ROUNDING (BHMG ONLY) (OUTPATIENT)
Dept: NEUROLOGY | Facility: CLINIC | Age: 74
End: 2024-03-08
Payer: MEDICAID

## 2024-03-18 ENCOUNTER — TELEPHONE (OUTPATIENT)
Dept: INTERNAL MEDICINE | Facility: CLINIC | Age: 74
End: 2024-03-18

## 2024-03-18 ENCOUNTER — READMISSION MANAGEMENT (OUTPATIENT)
Dept: CALL CENTER | Facility: HOSPITAL | Age: 74
End: 2024-03-18
Payer: MEDICAID

## 2024-03-18 NOTE — TELEPHONE ENCOUNTER
Caller: DYAN DIAZ    Relationship to patient: Emergency Contact    Best call back number: [phone]     New or established patient?  [] New  [x] Established    Date of discharge: 03/16/24    Facility discharged from: [facility]    Diagnosis/Symptoms: TARRY STOOL-EXHAUSTION-OFF BALANCE    Length of stay (If applicable): 3 DAYS    Specialty Only: Did you see a [facility] provider?    [] Yes  [x] No  If so, who?

## 2024-03-18 NOTE — OUTREACH NOTE
Prep Survey      Flowsheet Row Responses   [facility] facility patient discharged from? Non-BH   Is LACE score < 7 ? Non-BH Discharge   Eligibility [facility]   Date of Admission 03/13/24   Date of Discharge 03/16/24   Discharge Disposition Home or Self Care   Discharge diagnosis TARRY STOOL-EXHAUSTION-OFF BALANCE   Does the patient have one of the following disease processes/diagnoses(primary or secondary)? Other   Prep survey completed? Yes            Yari KAUFMAN - Registered Nurse

## 2024-03-19 ENCOUNTER — TRANSITIONAL CARE MANAGEMENT TELEPHONE ENCOUNTER (OUTPATIENT)
Dept: CALL CENTER | Facility: HOSPITAL | Age: 74
End: 2024-03-19
Payer: MEDICAID

## 2024-03-19 NOTE — OUTREACH NOTE
Call Center TCM Note      Flowsheet Row Responses   [facility] patient discharged from? Non-  [[place]]   Does the patient have one of the following disease processes/diagnoses(primary or secondary)? Other   TCM attempt successful? Yes  [ VR]   Call start time 902   Call end time 904   Discharge diagnosis TARRY STOOL-EXHAUSTION-OFF BALANCE   Does the patient have all medications ordered at discharge? N/A   Is the patient taking all medications as directed (includes completed medication regime)? Yes   Comments HOSP DC FU appt 3/19/24 3 pm.   Does the patient have an appointment with their PCP within 7-14 days of discharge? Yes   Has home health visited the patient within 72 hours of discharge? N/A   Psychosocial issues? No   Did the patient receive a copy of their discharge instructions? Yes   What is the patient's perception of their health status since discharge? Improving   Is the patient/caregiver able to teach back signs and symptoms related to disease process for when to call PCP? Yes   Is the patient/caregiver able to teach back signs and symptoms related to disease process for when to call 911? Yes   Is the patient/caregiver able to teach back the hierarchy of who to call/visit for symptoms/problems? PCP, Specialist, Home health nurse, Urgent Care, ED, 911 Yes   TCM call completed? Yes   Wrap up additional comments No s/s of bleeding. Pt reports she is doing a little better. Aware of appt today. Denies any new meds   Call end time 904            Sonali Patricia RN    3/19/2024, 09:04 EDT

## 2024-03-22 ENCOUNTER — PATIENT ROUNDING (BHMG ONLY) (OUTPATIENT)
Dept: CARDIOLOGY | Facility: CLINIC | Age: 74
End: 2024-03-22
Payer: MEDICAID

## 2024-03-22 NOTE — PROGRESS NOTES
..My name is Trinity Ford and I am a Referral Coordinator for [facility].    I would like to thank you for being a loyal patient. If you do not mind I would like to ask you a few questions about your recent visit with us.  Please feel free to reply if you wish to provide us with feedback on your first visit with our practice.    First, could you tell me what went well with your recent visit?    Secondly, we are always looking for ways to make our patients' experiences even better.  Do you have any recommendations on ways we may improve?    Finally, overall were you satisfied with your first visit to us as a [facility] facility?    In the next few days, you will be receiving a Patient Experience Survey.      Thank you for taking the time to answer a few questions today.  I hope you have a good day.

## 2024-04-15 ENCOUNTER — OFFICE VISIT (OUTPATIENT)
Dept: INTERNAL MEDICINE | Facility: CLINIC | Age: 74
End: 2024-04-15
Payer: MEDICAID

## 2024-04-15 VITALS
DIASTOLIC BLOOD PRESSURE: 68 MMHG | HEART RATE: 83 BPM | OXYGEN SATURATION: 97 % | HEIGHT: 56 IN | WEIGHT: 127 LBS | BODY MASS INDEX: 28.57 KG/M2 | SYSTOLIC BLOOD PRESSURE: 120 MMHG | TEMPERATURE: 98 F

## 2024-04-15 DIAGNOSIS — F02.A0 MILD LATE ONSET ALZHEIMER'S DEMENTIA WITHOUT BEHAVIORAL DISTURBANCE, PSYCHOTIC DISTURBANCE, MOOD DISTURBANCE, OR ANXIETY: ICD-10-CM

## 2024-04-15 DIAGNOSIS — R53.1 GENERALIZED WEAKNESS: ICD-10-CM

## 2024-04-15 DIAGNOSIS — A08.32 ASTROVIRUS GASTROENTERITIS: Primary | ICD-10-CM

## 2024-04-15 DIAGNOSIS — R63.4 UNINTENTIONAL WEIGHT LOSS: ICD-10-CM

## 2024-04-15 DIAGNOSIS — G30.1 MILD LATE ONSET ALZHEIMER'S DEMENTIA WITHOUT BEHAVIORAL DISTURBANCE, PSYCHOTIC DISTURBANCE, MOOD DISTURBANCE, OR ANXIETY: ICD-10-CM

## 2024-04-15 DIAGNOSIS — I21.4 NSTEMI (NON-ST ELEVATED MYOCARDIAL INFARCTION): ICD-10-CM

## 2024-04-15 PROCEDURE — 3078F DIAST BP <80 MM HG: CPT | Performed by: NURSE PRACTITIONER

## 2024-04-15 PROCEDURE — 3074F SYST BP LT 130 MM HG: CPT | Performed by: NURSE PRACTITIONER

## 2024-04-15 PROCEDURE — 99214 OFFICE O/P EST MOD 30 MIN: CPT | Performed by: NURSE PRACTITIONER

## 2024-04-15 PROCEDURE — 1160F RVW MEDS BY RX/DR IN RCRD: CPT | Performed by: NURSE PRACTITIONER

## 2024-04-15 PROCEDURE — 1159F MED LIST DOCD IN RCRD: CPT | Performed by: NURSE PRACTITIONER

## 2024-04-15 RX ORDER — ASPIRIN 81 MG/1
81 TABLET ORAL DAILY
COMMUNITY

## 2024-04-15 RX ORDER — ATORVASTATIN CALCIUM 10 MG/1
10 TABLET, FILM COATED ORAL DAILY
COMMUNITY

## 2024-04-15 NOTE — PROGRESS NOTES
Chief Complaint   Patient presents with    Follow-up    Chest Pain    NSTEMI       HPI  Carolyn Snowden is a 73 y.o. female presents for follow-up.  Pt had a hospital admission in March in [place] for STEMI and astrovirus.  This was determined after she presented to the ED with weakness and diarrhea.  Pt is accompanied by her daughter.  Pt was referred to cardiology but when she showed up for the appt they told her she didn't have one.  Daughter would like her referred to someone with Buddhist.  Pt stopped taking all her medications last week.  She states that she didn't like how the Zoloft made her feel, although her daughter states that she never stopped it.  Daughter states the recently had to move suddenly and they are now living in an assisted living center.  She lives with her.  Pt continues to drive which worries her.  The patient has left the home a couple times and returned soon thereafter because she forgot where she was going.  Pt has been anxious and depressed and the daughter states that she has seen a decline in her mental status since the move. She is followed by neurology (GUERRERO Andrews, JEAN-CLAUDE)    The following portions of the patient's history were reviewed and updated as appropriate: allergies, current medications, past family history, past medical history, past social history, past surgical history, and problem list.    Subjective  Review of Systems   Constitutional:  Positive for fatigue and unexpected weight loss. Negative for activity change and appetite change.   HENT:  Negative for congestion.    Respiratory:  Negative for cough and shortness of breath.    Cardiovascular:  Negative for chest pain and leg swelling.   Gastrointestinal:  Negative for abdominal pain.   Neurological:  Negative for dizziness, weakness and confusion.   Psychiatric/Behavioral:  Positive for depressed mood. Negative for behavioral problems, decreased concentration and sleep disturbance. The patient is nervous/anxious.  "       Objective  Visit Vitals  /68 (BP Location: Left arm, Patient Position: Sitting)   Pulse 83   Temp 98 °F (36.7 °C)   Ht 142.2 cm (56\")   Wt 57.6 kg (127 lb)   SpO2 97%   BMI 28.47 kg/m²        Physical Exam  Vitals and nursing note reviewed.   HENT:      Head: Normocephalic.   Eyes:      Pupils: Pupils are equal, round, and reactive to light.   Cardiovascular:      Rate and Rhythm: Normal rate and regular rhythm.      Pulses: Normal pulses.      Heart sounds: Normal heart sounds.   Pulmonary:      Effort: Pulmonary effort is normal. No respiratory distress.   Skin:     General: Skin is warm and dry.      Capillary Refill: Capillary refill takes less than 2 seconds.   Neurological:      General: No focal deficit present.      Mental Status: She is alert and oriented to person, place, and time.      Gait: Gait is intact.   Psychiatric:         Attention and Perception: Attention normal.         Mood and Affect: Mood normal. Mood is depressed. Affect is tearful.         Behavior: Behavior normal.          Procedures     Assessment and Plan  Diagnoses and all orders for this visit:    1. Astrovirus gastroenteritis (Primary)    2. NSTEMI (non-ST elevated myocardial infarction)  -     Ambulatory Referral to Cardiology    3. Generalized weakness    4. Mild late onset Alzheimer's dementia without behavioral disturbance, psychotic disturbance, mood disturbance, or anxiety    5. Unintentional weight loss    D/C summary reviewed  >14 day discharge  Diarrhea has resolved  No CP/SOB  Highly recommend that pt not drive, discussed risks with her at length, daughter in agreement  Pt declines antidepressant at this time  Encouraged to pick hobbies and socialize at her new location  Recommend protein shakes daily to prevent further weight loss  Instructed daily ASA per hospital instructions and restart statin    Return in about 2 months (around 6/15/2024) for Recheck weight, Anxiety.      JEAN-CLAUDE Lindsey       "

## 2024-05-21 ENCOUNTER — OFFICE VISIT (OUTPATIENT)
Dept: INTERNAL MEDICINE | Facility: CLINIC | Age: 74
End: 2024-05-21

## 2024-05-21 VITALS
WEIGHT: 128 LBS | TEMPERATURE: 98 F | OXYGEN SATURATION: 99 % | HEART RATE: 78 BPM | BODY MASS INDEX: 28.79 KG/M2 | HEIGHT: 56 IN | SYSTOLIC BLOOD PRESSURE: 126 MMHG | DIASTOLIC BLOOD PRESSURE: 78 MMHG

## 2024-05-21 DIAGNOSIS — F02.B4 MODERATE ALZHEIMER'S DEMENTIA OF OTHER ONSET WITH ANXIETY: Primary | ICD-10-CM

## 2024-05-21 DIAGNOSIS — G30.8 MODERATE ALZHEIMER'S DEMENTIA OF OTHER ONSET WITH ANXIETY: Primary | ICD-10-CM

## 2024-05-21 DIAGNOSIS — R63.4 UNINTENTIONAL WEIGHT LOSS: ICD-10-CM

## 2024-05-21 DIAGNOSIS — E78.00 PURE HYPERCHOLESTEROLEMIA: ICD-10-CM

## 2024-05-21 PROCEDURE — 99214 OFFICE O/P EST MOD 30 MIN: CPT | Performed by: NURSE PRACTITIONER

## 2024-05-21 RX ORDER — ATORVASTATIN CALCIUM 10 MG/1
10 TABLET, FILM COATED ORAL DAILY
Qty: 90 TABLET | Refills: 4 | Status: SHIPPED | OUTPATIENT
Start: 2024-05-21

## 2024-05-21 RX ORDER — CLONAZEPAM 1 MG/1
1 TABLET ORAL 2 TIMES DAILY PRN
COMMUNITY

## 2024-05-21 NOTE — PROGRESS NOTES
"Chief Complaint   Patient presents with    Anxiety    Follow-up       HPI  Carolyn Snowden is a 73 y.o. female presents for follow-up on anxiety and weight loss.  Patient has managed to maintain her weight since her last visit.  She states that she drinks Ensure \"every now and again\".  She is accompanied by her daughter.  Patient and her daughter feels that her anxiety is more stable.  Pt was restarted on Clonazepam by her psych provider which has helped.  The daughter states that she can tell that her alzheimers is worsening.  She states she is constantly asking the same questions.  She sleeps on the couch ever night.  She states the pt refuses to sleep in her bed.  The daughter is concerned because the pt is still adiment to drive.  She has also been adding extra medication to her pill organizer after the daughter fills it.    The following portions of the patient's history were reviewed and updated as appropriate: allergies, current medications, past family history, past medical history, past social history, past surgical history, and problem list.    Subjective  Review of Systems   Constitutional:  Negative for activity change, appetite change and fatigue.   HENT:  Negative for congestion.    Respiratory:  Negative for cough and shortness of breath.    Cardiovascular:  Negative for chest pain and leg swelling.   Gastrointestinal:  Negative for abdominal pain.   Musculoskeletal:  Negative for arthralgias.   Neurological:  Positive for memory problem. Negative for dizziness, weakness and confusion.   Psychiatric/Behavioral:  Positive for depressed mood. Negative for behavioral problems and decreased concentration. The patient is nervous/anxious.        Objective  Visit Vitals  /78 (BP Location: Left arm, Patient Position: Sitting)   Pulse 78   Temp 98 °F (36.7 °C)   Ht 142.2 cm (56\")   Wt 58.1 kg (128 lb)   SpO2 99%   BMI 28.70 kg/m²        Physical Exam  Vitals and nursing note reviewed.   HENT:      " Head: Normocephalic.   Eyes:      Pupils: Pupils are equal, round, and reactive to light.   Pulmonary:      Effort: Pulmonary effort is normal. No respiratory distress.   Skin:     General: Skin is warm and dry.      Capillary Refill: Capillary refill takes less than 2 seconds.   Neurological:      General: No focal deficit present.      Mental Status: She is alert and oriented to person, place, and time.      Gait: Gait is intact.   Psychiatric:         Attention and Perception: Attention normal.         Mood and Affect: Mood normal.         Behavior: Behavior normal.          Procedures     Assessment and Plan  Diagnoses and all orders for this visit:    1. Moderate Alzheimer's dementia of other onset with anxiety (Primary)    2. Pure hypercholesterolemia  -     atorvastatin (LIPITOR) 10 MG tablet; Take 1 tablet by mouth Daily.  Dispense: 90 tablet; Refill: 4    3. Unintentional weight loss    Alzheimers appears to be worsening  Cont Namenda  Instructed no driving, daughter to take keys  Instructed daughter to keep medication at all times to avoid accidental overdose  Cont statin - will check labs next visit  Seems to be maintaining her weight at this time  Anxiety managed by psych      Return in about 3 months (around 8/21/2024) for Hyperlipidemia.      JEAN-CLAUDE Lindsey

## 2024-07-03 DIAGNOSIS — F02.A0 MILD LATE ONSET ALZHEIMER'S DEMENTIA WITHOUT BEHAVIORAL DISTURBANCE, PSYCHOTIC DISTURBANCE, MOOD DISTURBANCE, OR ANXIETY: ICD-10-CM

## 2024-07-03 DIAGNOSIS — G30.1 MILD LATE ONSET ALZHEIMER'S DEMENTIA WITHOUT BEHAVIORAL DISTURBANCE, PSYCHOTIC DISTURBANCE, MOOD DISTURBANCE, OR ANXIETY: ICD-10-CM

## 2024-07-03 RX ORDER — MEMANTINE HYDROCHLORIDE 28 MG/1
28 CAPSULE, EXTENDED RELEASE ORAL NIGHTLY
Qty: 90 CAPSULE | Refills: 3 | Status: SHIPPED | OUTPATIENT
Start: 2024-07-03

## 2024-07-09 ENCOUNTER — TELEPHONE (OUTPATIENT)
Dept: NEUROLOGY | Facility: CLINIC | Age: 74
End: 2024-07-09

## 2024-07-09 NOTE — TELEPHONE ENCOUNTER
Provider: ANDERSON FERRELL APRN     Caller: DYAN    Relationship to Patient: EC    Phone Number: [phone]    Reason for Call: CALLING REGARDING THE NAMENDA.  STATED IT NOW SAYS TO TAKE AT NIGHT.   STATED PATIENT HAS ALWAYS TAKEN IT IN THE MORNINGS AND WAS NOT TOLD TO CHANGE  IT TO NIGHT TIME.  STATED SHE ALSO TAKES KLONOPIN AT NIGHT.   CALLING TO SEE IF THIS IS CORRECT?    When was the patient last seen: 3-4-24      PLEASE CALL & ADVISE

## 2024-07-24 ENCOUNTER — OFFICE VISIT (OUTPATIENT)
Dept: INTERNAL MEDICINE | Facility: CLINIC | Age: 74
End: 2024-07-24
Payer: MEDICARE

## 2024-07-24 VITALS
DIASTOLIC BLOOD PRESSURE: 82 MMHG | HEIGHT: 61 IN | OXYGEN SATURATION: 98 % | SYSTOLIC BLOOD PRESSURE: 136 MMHG | WEIGHT: 130.2 LBS | HEART RATE: 76 BPM | BODY MASS INDEX: 24.58 KG/M2 | TEMPERATURE: 98 F

## 2024-07-24 DIAGNOSIS — R23.8 BLISTERS OF MULTIPLE SITES: Primary | ICD-10-CM

## 2024-07-24 PROCEDURE — 3079F DIAST BP 80-89 MM HG: CPT | Performed by: NURSE PRACTITIONER

## 2024-07-24 PROCEDURE — 3075F SYST BP GE 130 - 139MM HG: CPT | Performed by: NURSE PRACTITIONER

## 2024-07-24 PROCEDURE — 99213 OFFICE O/P EST LOW 20 MIN: CPT | Performed by: NURSE PRACTITIONER

## 2024-07-24 PROCEDURE — 1160F RVW MEDS BY RX/DR IN RCRD: CPT | Performed by: NURSE PRACTITIONER

## 2024-07-24 PROCEDURE — 1159F MED LIST DOCD IN RCRD: CPT | Performed by: NURSE PRACTITIONER

## 2024-07-24 PROCEDURE — 1126F AMNT PAIN NOTED NONE PRSNT: CPT | Performed by: NURSE PRACTITIONER

## 2024-07-24 NOTE — PROGRESS NOTES
"Chief Complaint   Patient presents with    Blister     On both legs and arms.       HPI  Carolyn Snowden is a 73 y.o. female presents with blisters.  She is accompanied by her daughter.  She states that she has been having random large blisters pop up on her arms and upper legs.  These do not hurt or itch.  She usually pops them or they pop on their lown.  No redness or trauma to the skin    The following portions of the patient's history were reviewed and updated as appropriate: allergies, current medications, past family history, past medical history, past social history, past surgical history, and problem list.    Subjective  Review of Systems   Constitutional:  Negative for activity change, appetite change and fatigue.   HENT:  Negative for congestion.    Respiratory:  Negative for cough and shortness of breath.    Cardiovascular:  Negative for chest pain and leg swelling.   Gastrointestinal:  Negative for abdominal pain.   Skin:  Positive for skin lesions. Negative for color change and rash.   Neurological:  Negative for dizziness, weakness and confusion.   Psychiatric/Behavioral:  Negative for behavioral problems and decreased concentration.        Objective  Visit Vitals  /82 (BP Location: Left arm, Patient Position: Sitting)   Pulse 76   Temp 98 °F (36.7 °C)   Ht 154.9 cm (61\")   Wt 59.1 kg (130 lb 3.2 oz)   SpO2 98%   BMI 24.60 kg/m²        Physical Exam  Vitals and nursing note reviewed.   HENT:      Head: Normocephalic.   Eyes:      Pupils: Pupils are equal, round, and reactive to light.   Cardiovascular:      Pulses: Normal pulses.   Pulmonary:      Effort: Pulmonary effort is normal. No respiratory distress.   Skin:     General: Skin is warm and dry.      Capillary Refill: Capillary refill takes less than 2 seconds.          Neurological:      General: No focal deficit present.      Mental Status: She is alert and oriented to person, place, and time.      Gait: Gait is intact.   Psychiatric:    "      Attention and Perception: Attention normal.         Mood and Affect: Mood normal.         Behavior: Behavior normal.          Procedures     Assessment and Plan  Diagnoses and all orders for this visit:    1. Blisters of multiple sites (Primary)  -     Wound Culture - Wound, Leg; Future    No redness or infection noted, just random popped blister  Return with new blister and will culture  ? Bullous pemphigoid    Return for Next scheduled follow up.      Ezekiel Vigil, APRN

## 2024-08-06 ENCOUNTER — OFFICE VISIT (OUTPATIENT)
Dept: INTERNAL MEDICINE | Facility: CLINIC | Age: 74
End: 2024-08-06
Payer: MEDICARE

## 2024-08-06 ENCOUNTER — LAB (OUTPATIENT)
Dept: INTERNAL MEDICINE | Facility: CLINIC | Age: 74
End: 2024-08-06

## 2024-08-06 VITALS
HEART RATE: 73 BPM | DIASTOLIC BLOOD PRESSURE: 88 MMHG | SYSTOLIC BLOOD PRESSURE: 150 MMHG | OXYGEN SATURATION: 98 % | WEIGHT: 129 LBS | HEIGHT: 61 IN | BODY MASS INDEX: 24.35 KG/M2 | TEMPERATURE: 97.8 F

## 2024-08-06 DIAGNOSIS — R06.02 SHORTNESS OF BREATH: ICD-10-CM

## 2024-08-06 DIAGNOSIS — F02.A0 MILD LATE ONSET ALZHEIMER'S DEMENTIA WITHOUT BEHAVIORAL DISTURBANCE, PSYCHOTIC DISTURBANCE, MOOD DISTURBANCE, OR ANXIETY: ICD-10-CM

## 2024-08-06 DIAGNOSIS — R60.0 PEDAL EDEMA: ICD-10-CM

## 2024-08-06 DIAGNOSIS — I10 ESSENTIAL HYPERTENSION: ICD-10-CM

## 2024-08-06 DIAGNOSIS — L85.3 DRY SKIN DERMATITIS: Primary | ICD-10-CM

## 2024-08-06 DIAGNOSIS — I10 ESSENTIAL HYPERTENSION: Primary | ICD-10-CM

## 2024-08-06 DIAGNOSIS — G30.1 MILD LATE ONSET ALZHEIMER'S DEMENTIA WITHOUT BEHAVIORAL DISTURBANCE, PSYCHOTIC DISTURBANCE, MOOD DISTURBANCE, OR ANXIETY: ICD-10-CM

## 2024-08-06 LAB
ALBUMIN SERPL-MCNC: 4.2 G/DL (ref 3.5–5.2)
ALBUMIN/GLOB SERPL: 1.4 G/DL
ALP SERPL-CCNC: 50 U/L (ref 39–117)
ALT SERPL W P-5'-P-CCNC: 11 U/L (ref 1–33)
ANION GAP SERPL CALCULATED.3IONS-SCNC: 10 MMOL/L (ref 5–15)
AST SERPL-CCNC: 18 U/L (ref 1–32)
BILIRUB SERPL-MCNC: 0.4 MG/DL (ref 0–1.2)
BUN SERPL-MCNC: 15 MG/DL (ref 8–23)
BUN/CREAT SERPL: 12.4 (ref 7–25)
CALCIUM SPEC-SCNC: 9.3 MG/DL (ref 8.6–10.5)
CHLORIDE SERPL-SCNC: 105 MMOL/L (ref 98–107)
CO2 SERPL-SCNC: 28 MMOL/L (ref 22–29)
CREAT SERPL-MCNC: 1.21 MG/DL (ref 0.57–1)
CRP SERPL-MCNC: <0.3 MG/DL (ref 0–0.5)
EGFRCR SERPLBLD CKD-EPI 2021: 46.5 ML/MIN/1.73
GLOBULIN UR ELPH-MCNC: 3 GM/DL
GLUCOSE SERPL-MCNC: 105 MG/DL (ref 65–99)
NT-PROBNP SERPL-MCNC: 800.6 PG/ML (ref 0–1800)
POTASSIUM SERPL-SCNC: 3.5 MMOL/L (ref 3.5–5.2)
PROT SERPL-MCNC: 7.2 G/DL (ref 6–8.5)
SODIUM SERPL-SCNC: 143 MMOL/L (ref 136–145)

## 2024-08-06 PROCEDURE — 85025 COMPLETE CBC W/AUTO DIFF WBC: CPT | Performed by: NURSE PRACTITIONER

## 2024-08-06 PROCEDURE — 3079F DIAST BP 80-89 MM HG: CPT | Performed by: NURSE PRACTITIONER

## 2024-08-06 PROCEDURE — 85652 RBC SED RATE AUTOMATED: CPT | Performed by: NURSE PRACTITIONER

## 2024-08-06 PROCEDURE — 99214 OFFICE O/P EST MOD 30 MIN: CPT | Performed by: NURSE PRACTITIONER

## 2024-08-06 PROCEDURE — 36415 COLL VENOUS BLD VENIPUNCTURE: CPT | Performed by: NURSE PRACTITIONER

## 2024-08-06 PROCEDURE — 83880 ASSAY OF NATRIURETIC PEPTIDE: CPT | Performed by: NURSE PRACTITIONER

## 2024-08-06 PROCEDURE — 80053 COMPREHEN METABOLIC PANEL: CPT | Performed by: NURSE PRACTITIONER

## 2024-08-06 PROCEDURE — 1160F RVW MEDS BY RX/DR IN RCRD: CPT | Performed by: NURSE PRACTITIONER

## 2024-08-06 PROCEDURE — 3077F SYST BP >= 140 MM HG: CPT | Performed by: NURSE PRACTITIONER

## 2024-08-06 PROCEDURE — 86140 C-REACTIVE PROTEIN: CPT | Performed by: NURSE PRACTITIONER

## 2024-08-06 PROCEDURE — 1126F AMNT PAIN NOTED NONE PRSNT: CPT | Performed by: NURSE PRACTITIONER

## 2024-08-06 PROCEDURE — 1159F MED LIST DOCD IN RCRD: CPT | Performed by: NURSE PRACTITIONER

## 2024-08-06 NOTE — PROGRESS NOTES
"Chief Complaint   Patient presents with    Leg Swelling       HPI  Carolyn Snowden is a 74 y.o. female presents with swelling of both legs and a rash of both lower extremities.  She is accompanied by her daughter.   Pt reports that the legs itch.  This has been going on for a few weeks but have progressively gotten worse.  Her daughter states that yesterday she noticed that her legs/feet were swollen.  Pt denies any CP/SOB or abdominal swelling.    The following portions of the patient's history were reviewed and updated as appropriate: allergies, current medications, past family history, past medical history, past social history, past surgical history, and problem list.    Subjective  Review of Systems   Constitutional:  Negative for activity change, appetite change and fatigue.   HENT:  Negative for congestion.    Respiratory:  Negative for cough and shortness of breath.    Cardiovascular:  Positive for leg swelling. Negative for chest pain.   Gastrointestinal:  Negative for abdominal pain.   Skin:  Positive for dry skin and skin lesions.   Neurological:  Positive for memory problem and confusion. Negative for dizziness and weakness.   Psychiatric/Behavioral:  Negative for behavioral problems and decreased concentration.        Objective  Visit Vitals  /88 (BP Location: Left arm, Patient Position: Sitting)   Pulse 73   Temp 97.8 °F (36.6 °C)   Ht 154.9 cm (61\")   Wt 58.5 kg (129 lb)   SpO2 98%   BMI 24.37 kg/m²        Physical Exam  Vitals and nursing note reviewed.   HENT:      Head: Normocephalic.   Eyes:      Pupils: Pupils are equal, round, and reactive to light.   Cardiovascular:      Rate and Rhythm: Normal rate and regular rhythm.      Pulses: Normal pulses.           Dorsalis pedis pulses are 2+ on the right side and 2+ on the left side.      Heart sounds: Normal heart sounds. No murmur heard.  Pulmonary:      Effort: Pulmonary effort is normal. No respiratory distress.      Breath sounds: Normal " breath sounds.   Musculoskeletal:      Right lower le+ Edema present.      Left lower le+ Edema present.   Skin:     General: Skin is warm and dry.      Capillary Refill: Capillary refill takes less than 2 seconds.      Comments: Both lower extremities with large patches of dry skin with scabs in them, multiple small blisters noted down the legs, no redness or signs of cellulitis   Neurological:      General: No focal deficit present.      Mental Status: She is alert and oriented to person, place, and time.      Gait: Gait is intact.   Psychiatric:         Attention and Perception: Attention normal.         Mood and Affect: Mood normal.         Behavior: Behavior normal.          Procedures     Assessment and Plan  Diagnoses and all orders for this visit:    1. Dry skin dermatitis (Primary)    2. Pedal edema  -     CBC w AUTO Differential  -     Comprehensive Metabolic Panel  -     proBNP  -     Sedimentation Rate  -     C-reactive Protein    3. Shortness of breath  -     proBNP    4. Essential hypertension    5. Mild late onset Alzheimer's dementia without behavioral disturbance, psychotic disturbance, mood disturbance, or anxiety    Check labs to r/o CHF  If creatinine OK will start low dose fluid pill  Vascular dz vs dermatitis  BP elevated today, if still elevated at f/u will start HTN med  Alzheimers appears stable at this time    Return in about 2 weeks (around 2024) for Pedal edema/rash.        JEAN-CLAUDE Lindsey

## 2024-08-07 ENCOUNTER — TELEPHONE (OUTPATIENT)
Dept: INTERNAL MEDICINE | Facility: CLINIC | Age: 74
End: 2024-08-07

## 2024-08-07 DIAGNOSIS — R60.0 PEDAL EDEMA: Primary | ICD-10-CM

## 2024-08-07 DIAGNOSIS — L85.3 DRY SKIN DERMATITIS: ICD-10-CM

## 2024-08-07 LAB
BASOPHILS # BLD AUTO: 0.05 10*3/MM3 (ref 0–0.2)
BASOPHILS NFR BLD AUTO: 1 % (ref 0–1.5)
DEPRECATED RDW RBC AUTO: 39.6 FL (ref 37–54)
EOSINOPHIL # BLD AUTO: 0.08 10*3/MM3 (ref 0–0.4)
EOSINOPHIL NFR BLD AUTO: 1.6 % (ref 0.3–6.2)
ERYTHROCYTE [DISTWIDTH] IN BLOOD BY AUTOMATED COUNT: 12.8 % (ref 12.3–15.4)
ERYTHROCYTE [SEDIMENTATION RATE] IN BLOOD: 11 MM/HR (ref 0–30)
HCT VFR BLD AUTO: 36.9 % (ref 34–46.6)
HGB BLD-MCNC: 11.8 G/DL (ref 12–15.9)
IMM GRANULOCYTES # BLD AUTO: 0.01 10*3/MM3 (ref 0–0.05)
IMM GRANULOCYTES NFR BLD AUTO: 0.2 % (ref 0–0.5)
LYMPHOCYTES # BLD AUTO: 1.94 10*3/MM3 (ref 0.7–3.1)
LYMPHOCYTES NFR BLD AUTO: 38.3 % (ref 19.6–45.3)
MCH RBC QN AUTO: 27.5 PG (ref 26.6–33)
MCHC RBC AUTO-ENTMCNC: 32 G/DL (ref 31.5–35.7)
MCV RBC AUTO: 86 FL (ref 79–97)
MONOCYTES # BLD AUTO: 0.6 10*3/MM3 (ref 0.1–0.9)
MONOCYTES NFR BLD AUTO: 11.8 % (ref 5–12)
NEUTROPHILS NFR BLD AUTO: 2.39 10*3/MM3 (ref 1.7–7)
NEUTROPHILS NFR BLD AUTO: 47.1 % (ref 42.7–76)
NRBC BLD AUTO-RTO: 0 /100 WBC (ref 0–0.2)
PLATELET # BLD AUTO: 186 10*3/MM3 (ref 140–450)
PMV BLD AUTO: 11.9 FL (ref 6–12)
RBC # BLD AUTO: 4.29 10*6/MM3 (ref 3.77–5.28)
WBC NRBC COR # BLD AUTO: 5.07 10*3/MM3 (ref 3.4–10.8)

## 2024-08-07 RX ORDER — TRIAMCINOLONE ACETONIDE 1 MG/G
1 CREAM TOPICAL 2 TIMES DAILY PRN
Qty: 453 G | Refills: 1 | Status: SHIPPED | OUTPATIENT
Start: 2024-08-07

## 2024-08-07 RX ORDER — HYDROCHLOROTHIAZIDE 12.5 MG/1
12.5 TABLET ORAL DAILY
Qty: 30 TABLET | Refills: 5 | Status: SHIPPED | OUTPATIENT
Start: 2024-08-07

## 2024-08-07 NOTE — TELEPHONE ENCOUNTER
*HUB can relay to patient*  Per Ezekiel  does not look like pt has heart failure.  I'm going to send her a topical steroid for her legs, along with a fluid pill.     Left voicemail

## 2024-08-23 ENCOUNTER — HOSPITAL ENCOUNTER (OUTPATIENT)
Facility: HOSPITAL | Age: 74
Discharge: HOME | End: 2024-08-23
Payer: MEDICARE

## 2024-08-23 ENCOUNTER — LAB (OUTPATIENT)
Dept: INTERNAL MEDICINE | Facility: CLINIC | Age: 74
End: 2024-08-23

## 2024-08-23 ENCOUNTER — OFFICE VISIT (OUTPATIENT)
Dept: INTERNAL MEDICINE | Facility: CLINIC | Age: 74
End: 2024-08-23
Payer: MEDICARE

## 2024-08-23 VITALS
OXYGEN SATURATION: 99 % | BODY MASS INDEX: 24.07 KG/M2 | HEIGHT: 60 IN | WEIGHT: 122.6 LBS | SYSTOLIC BLOOD PRESSURE: 128 MMHG | DIASTOLIC BLOOD PRESSURE: 80 MMHG | TEMPERATURE: 98 F | HEART RATE: 78 BPM

## 2024-08-23 DIAGNOSIS — H10.31 ACUTE BACTERIAL CONJUNCTIVITIS OF RIGHT EYE: ICD-10-CM

## 2024-08-23 DIAGNOSIS — R53.1 GENERALIZED WEAKNESS: ICD-10-CM

## 2024-08-23 DIAGNOSIS — N30.00 ACUTE CYSTITIS WITHOUT HEMATURIA: ICD-10-CM

## 2024-08-23 DIAGNOSIS — R41.0 CONFUSION: ICD-10-CM

## 2024-08-23 DIAGNOSIS — F41.0 PANIC ATTACK: Primary | ICD-10-CM

## 2024-08-23 DIAGNOSIS — I10 ESSENTIAL HYPERTENSION: ICD-10-CM

## 2024-08-23 DIAGNOSIS — I10 ESSENTIAL HYPERTENSION: Primary | ICD-10-CM

## 2024-08-23 LAB
AMMONIA BLD-SCNC: 12 UMOL/L (ref 11–51)
BASOPHILS # BLD AUTO: 0.04 10*3/MM3 (ref 0–0.2)
BASOPHILS NFR BLD AUTO: 0.8 % (ref 0–1.5)
BILIRUB BLD-MCNC: NEGATIVE MG/DL
CLARITY, POC: CLEAR
COLOR UR: YELLOW
DEPRECATED RDW RBC AUTO: 40.4 FL (ref 37–54)
EOSINOPHIL # BLD AUTO: 0.09 10*3/MM3 (ref 0–0.4)
EOSINOPHIL NFR BLD AUTO: 1.8 % (ref 0.3–6.2)
ERYTHROCYTE [DISTWIDTH] IN BLOOD BY AUTOMATED COUNT: 12.9 % (ref 12.3–15.4)
EXPIRATION DATE: ABNORMAL
GLUCOSE UR STRIP-MCNC: NEGATIVE MG/DL
HCT VFR BLD AUTO: 38.4 % (ref 34–46.6)
HGB BLD-MCNC: 12.2 G/DL (ref 12–15.9)
IMM GRANULOCYTES # BLD AUTO: 0 10*3/MM3 (ref 0–0.05)
IMM GRANULOCYTES NFR BLD AUTO: 0 % (ref 0–0.5)
KETONES UR QL: NEGATIVE
LEUKOCYTE EST, POC: ABNORMAL
LYMPHOCYTES # BLD AUTO: 2.52 10*3/MM3 (ref 0.7–3.1)
LYMPHOCYTES NFR BLD AUTO: 50.1 % (ref 19.6–45.3)
Lab: ABNORMAL
MCH RBC QN AUTO: 27.2 PG (ref 26.6–33)
MCHC RBC AUTO-ENTMCNC: 31.8 G/DL (ref 31.5–35.7)
MCV RBC AUTO: 85.5 FL (ref 79–97)
MONOCYTES # BLD AUTO: 0.55 10*3/MM3 (ref 0.1–0.9)
MONOCYTES NFR BLD AUTO: 10.9 % (ref 5–12)
NEUTROPHILS NFR BLD AUTO: 1.83 10*3/MM3 (ref 1.7–7)
NEUTROPHILS NFR BLD AUTO: 36.4 % (ref 42.7–76)
NITRITE UR-MCNC: NEGATIVE MG/ML
NRBC BLD AUTO-RTO: 0 /100 WBC (ref 0–0.2)
PH UR: 6 [PH] (ref 5–8)
PLATELET # BLD AUTO: 208 10*3/MM3 (ref 140–450)
PMV BLD AUTO: 11.9 FL (ref 6–12)
PROT UR STRIP-MCNC: NEGATIVE MG/DL
RBC # BLD AUTO: 4.49 10*6/MM3 (ref 3.77–5.28)
RBC # UR STRIP: NEGATIVE /UL
SP GR UR: 1.01 (ref 1–1.03)
UROBILINOGEN UR QL: NORMAL
WBC NRBC COR # BLD AUTO: 5.03 10*3/MM3 (ref 3.4–10.8)

## 2024-08-23 PROCEDURE — 3079F DIAST BP 80-89 MM HG: CPT | Performed by: NURSE PRACTITIONER

## 2024-08-23 PROCEDURE — 85025 COMPLETE CBC W/AUTO DIFF WBC: CPT | Performed by: NURSE PRACTITIONER

## 2024-08-23 PROCEDURE — 80053 COMPREHEN METABOLIC PANEL: CPT | Performed by: NURSE PRACTITIONER

## 2024-08-23 PROCEDURE — 87086 URINE CULTURE/COLONY COUNT: CPT | Performed by: NURSE PRACTITIONER

## 2024-08-23 PROCEDURE — 99214 OFFICE O/P EST MOD 30 MIN: CPT | Performed by: NURSE PRACTITIONER

## 2024-08-23 PROCEDURE — 83735 ASSAY OF MAGNESIUM: CPT | Performed by: NURSE PRACTITIONER

## 2024-08-23 PROCEDURE — 1160F RVW MEDS BY RX/DR IN RCRD: CPT | Performed by: NURSE PRACTITIONER

## 2024-08-23 PROCEDURE — 1159F MED LIST DOCD IN RCRD: CPT | Performed by: NURSE PRACTITIONER

## 2024-08-23 PROCEDURE — 3074F SYST BP LT 130 MM HG: CPT | Performed by: NURSE PRACTITIONER

## 2024-08-23 PROCEDURE — 1126F AMNT PAIN NOTED NONE PRSNT: CPT | Performed by: NURSE PRACTITIONER

## 2024-08-23 PROCEDURE — 70450 CT HEAD/BRAIN W/O DYE: CPT

## 2024-08-23 PROCEDURE — 82140 ASSAY OF AMMONIA: CPT | Performed by: NURSE PRACTITIONER

## 2024-08-23 PROCEDURE — 81003 URINALYSIS AUTO W/O SCOPE: CPT | Performed by: NURSE PRACTITIONER

## 2024-08-23 RX ORDER — NITROFURANTOIN 25; 75 MG/1; MG/1
100 CAPSULE ORAL 2 TIMES DAILY
Qty: 10 CAPSULE | Refills: 0 | Status: SHIPPED | OUTPATIENT
Start: 2024-08-23 | End: 2024-08-28

## 2024-08-23 RX ORDER — POLYMYXIN B SULFATE AND TRIMETHOPRIM 1; 10000 MG/ML; [USP'U]/ML
1 SOLUTION OPHTHALMIC EVERY 4 HOURS
Qty: 10 ML | Refills: 0 | Status: SHIPPED | OUTPATIENT
Start: 2024-08-23

## 2024-08-23 NOTE — PROGRESS NOTES
"Chief Complaint   Patient presents with    Anxiety    Hypertension     ED follow-up       HPI  Carolyn Snowden is a 74 y.o. female presents for an ED follow-up.  She is accompanied by her daughter.  Patient presented to [facility] last week due to weakness and SOB.  When she arrived at the ED her SBP was in the 200s.  She was given a Lorazepam and her symptoms subsided.  Pt is accompanied by her daughter.  Pt recently lost her brother.  She states he passed away about a month ago.  States she believes that triggered the anxiety attack.  Labs and CXR were normal in the ED.  Her daughter states that she has been weak and was a little confused at one point.  Patient states she just \"feels out of sorts\".  Yesterday she stumbled a couple times but didn't fall.  Pt also states that she feels like she's \"walking in a daze\" and she does admit to losing her balance.      The following portions of the patient's history were reviewed and updated as appropriate: allergies, current medications, past family history, past medical history, past social history, past surgical history, and problem list.    Subjective  Review of Systems   Constitutional:  Negative for activity change, appetite change and fatigue.   HENT:  Negative for congestion.    Respiratory:  Negative for cough and shortness of breath.    Cardiovascular:  Negative for chest pain and leg swelling.   Gastrointestinal:  Negative for abdominal pain.   Neurological:  Positive for dizziness, weakness and confusion. Negative for syncope and speech difficulty.   Psychiatric/Behavioral:  Negative for behavioral problems, decreased concentration and depressed mood. The patient is not nervous/anxious.        Objective  Visit Vitals  /80 (BP Location: Left arm, Patient Position: Sitting)   Pulse 78   Temp 98 °F (36.7 °C)   Ht 152.4 cm (60\")   Wt 55.6 kg (122 lb 9.6 oz)   SpO2 99%   BMI 23.94 kg/m²        Physical Exam  Vitals and nursing note reviewed.   HENT:      " Head: Normocephalic.   Eyes:      General: Lids are normal. No visual field deficit.        Right eye: No discharge.      Pupils: Pupils are equal, round, and reactive to light.      Comments: Sclerae pink   Cardiovascular:      Rate and Rhythm: Normal rate and regular rhythm.      Pulses: Normal pulses.      Heart sounds: Normal heart sounds.   Pulmonary:      Effort: Pulmonary effort is normal. No respiratory distress.      Breath sounds: Normal breath sounds.   Skin:     General: Skin is warm and dry.      Capillary Refill: Capillary refill takes less than 2 seconds.   Neurological:      General: No focal deficit present.      Mental Status: She is alert and oriented to person, place, and time.      Cranial Nerves: No cranial nerve deficit, dysarthria or facial asymmetry.      Motor: Motor function is intact.      Gait: Gait is intact.      Comments: Equal strength of upper and lower extremities   Psychiatric:         Attention and Perception: Attention normal.         Mood and Affect: Mood normal.         Behavior: Behavior normal.          Procedures     Results for orders placed or performed in visit on 08/23/24   Urine Culture - Urine, Urine, Clean Catch    Specimen: Urine, Clean Catch   Result Value Ref Range    Urine Culture No growth    Comprehensive Metabolic Panel    Specimen: Arm, Right; Blood   Result Value Ref Range    Glucose 83 65 - 99 mg/dL    BUN 16 8 - 23 mg/dL    Creatinine 1.00 0.57 - 1.00 mg/dL    Sodium 142 136 - 145 mmol/L    Potassium 3.6 3.5 - 5.2 mmol/L    Chloride 104 98 - 107 mmol/L    CO2 27.4 22.0 - 29.0 mmol/L    Calcium 9.4 8.6 - 10.5 mg/dL    Total Protein 7.2 6.0 - 8.5 g/dL    Albumin 4.3 3.5 - 5.2 g/dL    ALT (SGPT) 22 1 - 33 U/L    AST (SGOT) 27 1 - 32 U/L    Alkaline Phosphatase 53 39 - 117 U/L    Total Bilirubin 0.4 0.0 - 1.2 mg/dL    Globulin 2.9 gm/dL    A/G Ratio 1.5 g/dL    BUN/Creatinine Ratio 16.0 7.0 - 25.0    Anion Gap 10.6 5.0 - 15.0 mmol/L    eGFR 58.5 (L) >60.0  mL/min/1.73   Ammonia    Specimen: Blood   Result Value Ref Range    Ammonia 12 11 - 51 umol/L   Magnesium    Specimen: Arm, Right; Blood   Result Value Ref Range    Magnesium 2.2 1.6 - 2.4 mg/dL   CBC Auto Differential    Specimen: Blood   Result Value Ref Range    WBC 5.03 3.40 - 10.80 10*3/mm3    RBC 4.49 3.77 - 5.28 10*6/mm3    Hemoglobin 12.2 12.0 - 15.9 g/dL    Hematocrit 38.4 34.0 - 46.6 %    MCV 85.5 79.0 - 97.0 fL    MCH 27.2 26.6 - 33.0 pg    MCHC 31.8 31.5 - 35.7 g/dL    RDW 12.9 12.3 - 15.4 %    RDW-SD 40.4 37.0 - 54.0 fl    MPV 11.9 6.0 - 12.0 fL    Platelets 208 140 - 450 10*3/mm3    Neutrophil % 36.4 (L) 42.7 - 76.0 %    Lymphocyte % 50.1 (H) 19.6 - 45.3 %    Monocyte % 10.9 5.0 - 12.0 %    Eosinophil % 1.8 0.3 - 6.2 %    Basophil % 0.8 0.0 - 1.5 %    Immature Grans % 0.0 0.0 - 0.5 %    Neutrophils, Absolute 1.83 1.70 - 7.00 10*3/mm3    Lymphocytes, Absolute 2.52 0.70 - 3.10 10*3/mm3    Monocytes, Absolute 0.55 0.10 - 0.90 10*3/mm3    Eosinophils, Absolute 0.09 0.00 - 0.40 10*3/mm3    Basophils, Absolute 0.04 0.00 - 0.20 10*3/mm3    Immature Grans, Absolute 0.00 0.00 - 0.05 10*3/mm3    nRBC 0.0 0.0 - 0.2 /100 WBC   POC Urinalysis Dipstick, Automated    Specimen: Urine   Result Value Ref Range    Color Yellow Yellow, Straw, Dark Yellow, Mackenzie    Clarity, UA Clear Clear    Specific Gravity  1.015 1.005 - 1.030    pH, Urine 6.0 5.0 - 8.0    Leukocytes Moderate (2+) (A) Negative    Nitrite, UA Negative Negative    Protein, POC Negative Negative mg/dL    Glucose, UA Negative Negative mg/dL    Ketones, UA Negative Negative    Urobilinogen, UA Normal Normal, 0.2 E.U./dL    Bilirubin Negative Negative    Blood, UA Negative Negative    Lot Number [id number]     Expiration Date 01/13/2026           Assessment and Plan  Diagnoses and all orders for this visit:    1. Panic attack (Primary)    2. Acute cystitis without hematuria  -     nitrofurantoin, macrocrystal-monohydrate, (Macrobid) 100 MG capsule; Take 1  capsule by mouth 2 (Two) Times a Day for 5 days.  Dispense: 10 capsule; Refill: 0  -     Urine Culture - Urine, Urine, Clean Catch; Future  -     Urine Culture - Urine, Urine, Clean Catch    3. Generalized weakness  -     CBC & Differential  -     Comprehensive Metabolic Panel  -     Ammonia  -     Magnesium  -     CT Head Without Contrast; Future    4. Confusion  -     CBC & Differential  -     Comprehensive Metabolic Panel  -     Ammonia  -     Magnesium  -     CT Head Without Contrast; Future  -     POC Urinalysis Dipstick, Automated    5. Essential hypertension    6. Acute bacterial conjunctivitis of right eye  -     trimethoprim-polymyxin b (Polytrim) 31586-2.1 UNIT/ML-% ophthalmic solution; Administer 1 drop to the right eye Every 4 (Four) Hours.  Dispense: 10 mL; Refill: 0    ED note/tests reviewed  + leuks in urine - start Macrobid\  Check labs  HTN stable on HCTZ  Polytrim for eye    Return in about 2 weeks (around 9/6/2024) for weakness, confusion.      Ezekiel Vigil, APRN

## 2024-08-24 LAB
ALBUMIN SERPL-MCNC: 4.3 G/DL (ref 3.5–5.2)
ALBUMIN/GLOB SERPL: 1.5 G/DL
ALP SERPL-CCNC: 53 U/L (ref 39–117)
ALT SERPL W P-5'-P-CCNC: 22 U/L (ref 1–33)
ANION GAP SERPL CALCULATED.3IONS-SCNC: 10.6 MMOL/L (ref 5–15)
AST SERPL-CCNC: 27 U/L (ref 1–32)
BILIRUB SERPL-MCNC: 0.4 MG/DL (ref 0–1.2)
BUN SERPL-MCNC: 16 MG/DL (ref 8–23)
BUN/CREAT SERPL: 16 (ref 7–25)
CALCIUM SPEC-SCNC: 9.4 MG/DL (ref 8.6–10.5)
CHLORIDE SERPL-SCNC: 104 MMOL/L (ref 98–107)
CO2 SERPL-SCNC: 27.4 MMOL/L (ref 22–29)
CREAT SERPL-MCNC: 1 MG/DL (ref 0.57–1)
EGFRCR SERPLBLD CKD-EPI 2021: 58.5 ML/MIN/1.73
GLOBULIN UR ELPH-MCNC: 2.9 GM/DL
GLUCOSE SERPL-MCNC: 83 MG/DL (ref 65–99)
MAGNESIUM SERPL-MCNC: 2.2 MG/DL (ref 1.6–2.4)
POTASSIUM SERPL-SCNC: 3.6 MMOL/L (ref 3.5–5.2)
PROT SERPL-MCNC: 7.2 G/DL (ref 6–8.5)
SODIUM SERPL-SCNC: 142 MMOL/L (ref 136–145)

## 2024-08-25 LAB — BACTERIA SPEC AEROBE CULT: NO GROWTH

## 2024-09-07 ENCOUNTER — APPOINTMENT (OUTPATIENT)
Dept: CT IMAGING | Facility: HOSPITAL | Age: 74
End: 2024-09-07
Payer: MEDICARE

## 2024-09-07 ENCOUNTER — HOSPITAL ENCOUNTER (EMERGENCY)
Facility: HOSPITAL | Age: 74
Discharge: HOME | End: 2024-09-07
Attending: EMERGENCY MEDICINE
Payer: MEDICARE

## 2024-09-07 ENCOUNTER — APPOINTMENT (OUTPATIENT)
Dept: MRI IMAGING | Facility: HOSPITAL | Age: 74
End: 2024-09-07
Payer: MEDICARE

## 2024-09-07 ENCOUNTER — APPOINTMENT (OUTPATIENT)
Dept: GENERAL RADIOLOGY | Facility: HOSPITAL | Age: 74
End: 2024-09-07
Payer: MEDICARE

## 2024-09-07 VITALS
BODY MASS INDEX: 23.79 KG/M2 | DIASTOLIC BLOOD PRESSURE: 78 MMHG | TEMPERATURE: 98 F | HEIGHT: 61 IN | SYSTOLIC BLOOD PRESSURE: 185 MMHG | HEART RATE: 71 BPM | RESPIRATION RATE: 16 BRPM | OXYGEN SATURATION: 100 % | WEIGHT: 126 LBS

## 2024-09-07 DIAGNOSIS — R42 DIZZINESS: ICD-10-CM

## 2024-09-07 DIAGNOSIS — R41.82 ACUTE ON CHRONIC ALTERATION IN MENTAL STATUS: ICD-10-CM

## 2024-09-07 DIAGNOSIS — R26.9 GAIT DISTURBANCE: Primary | ICD-10-CM

## 2024-09-07 DIAGNOSIS — I10 ELEVATED BLOOD PRESSURE READING WITH DIAGNOSIS OF HYPERTENSION: ICD-10-CM

## 2024-09-07 LAB
ALBUMIN SERPL-MCNC: 4.3 G/DL (ref 3.5–5.2)
ALBUMIN/GLOB SERPL: 1.3 G/DL
ALP SERPL-CCNC: 62 U/L (ref 39–117)
ALT SERPL W P-5'-P-CCNC: 13 U/L (ref 1–33)
ANION GAP SERPL CALCULATED.3IONS-SCNC: 10 MMOL/L (ref 5–15)
AST SERPL-CCNC: 19 U/L (ref 1–32)
BASOPHILS # BLD AUTO: 0.06 10*3/MM3 (ref 0–0.2)
BASOPHILS NFR BLD AUTO: 0.7 % (ref 0–1.5)
BILIRUB SERPL-MCNC: 0.5 MG/DL (ref 0–1.2)
BILIRUB UR QL STRIP: NEGATIVE
BUN SERPL-MCNC: 13 MG/DL (ref 8–23)
BUN/CREAT SERPL: 13.5 (ref 7–25)
CALCIUM SPEC-SCNC: 9.7 MG/DL (ref 8.6–10.5)
CHLORIDE SERPL-SCNC: 106 MMOL/L (ref 98–107)
CLARITY UR: CLEAR
CO2 SERPL-SCNC: 29 MMOL/L (ref 22–29)
COLOR UR: YELLOW
CREAT SERPL-MCNC: 0.96 MG/DL (ref 0.57–1)
D-LACTATE SERPL-SCNC: 1 MMOL/L (ref 0.5–2)
DEPRECATED RDW RBC AUTO: 47.2 FL (ref 37–54)
EGFRCR SERPLBLD CKD-EPI 2021: 61.4 ML/MIN/1.73
EOSINOPHIL # BLD AUTO: 0.16 10*3/MM3 (ref 0–0.4)
EOSINOPHIL NFR BLD AUTO: 1.9 % (ref 0.3–6.2)
ERYTHROCYTE [DISTWIDTH] IN BLOOD BY AUTOMATED COUNT: 15.1 % (ref 12.3–15.4)
FLUAV RNA RESP QL NAA+PROBE: NOT DETECTED
FLUBV RNA RESP QL NAA+PROBE: NOT DETECTED
GLOBULIN UR ELPH-MCNC: 3.3 GM/DL
GLUCOSE SERPL-MCNC: 80 MG/DL (ref 65–99)
GLUCOSE UR STRIP-MCNC: NEGATIVE MG/DL
HCT VFR BLD AUTO: 38 % (ref 34–46.6)
HGB BLD-MCNC: 12.2 G/DL (ref 12–15.9)
HGB UR QL STRIP.AUTO: NEGATIVE
HOLD SPECIMEN: NORMAL
IMM GRANULOCYTES # BLD AUTO: 0.04 10*3/MM3 (ref 0–0.05)
IMM GRANULOCYTES NFR BLD AUTO: 0.5 % (ref 0–0.5)
INR PPP: 0.97 (ref 0.89–1.12)
KETONES UR QL STRIP: NEGATIVE
LEUKOCYTE ESTERASE UR QL STRIP.AUTO: NEGATIVE
LYMPHOCYTES # BLD AUTO: 3.09 10*3/MM3 (ref 0.7–3.1)
LYMPHOCYTES NFR BLD AUTO: 36.5 % (ref 19.6–45.3)
MCH RBC QN AUTO: 27.5 PG (ref 26.6–33)
MCHC RBC AUTO-ENTMCNC: 32.1 G/DL (ref 31.5–35.7)
MCV RBC AUTO: 85.6 FL (ref 79–97)
MONOCYTES # BLD AUTO: 0.78 10*3/MM3 (ref 0.1–0.9)
MONOCYTES NFR BLD AUTO: 9.2 % (ref 5–12)
NEUTROPHILS NFR BLD AUTO: 4.34 10*3/MM3 (ref 1.7–7)
NEUTROPHILS NFR BLD AUTO: 51.2 % (ref 42.7–76)
NITRITE UR QL STRIP: NEGATIVE
NRBC BLD AUTO-RTO: 0 /100 WBC (ref 0–0.2)
PH UR STRIP.AUTO: 7 [PH] (ref 5–8)
PLATELET # BLD AUTO: 199 10*3/MM3 (ref 140–450)
PMV BLD AUTO: 11.5 FL (ref 6–12)
POTASSIUM SERPL-SCNC: 4 MMOL/L (ref 3.5–5.2)
PROCALCITONIN SERPL-MCNC: 0.06 NG/ML (ref 0–0.25)
PROT SERPL-MCNC: 7.6 G/DL (ref 6–8.5)
PROT UR QL STRIP: NEGATIVE
PROTHROMBIN TIME: 13 SECONDS (ref 12.2–14.5)
QT INTERVAL: 420 MS
QTC INTERVAL: 450 MS
RBC # BLD AUTO: 4.44 10*6/MM3 (ref 3.77–5.28)
SARS-COV-2 RNA RESP QL NAA+PROBE: NOT DETECTED
SODIUM SERPL-SCNC: 145 MMOL/L (ref 136–145)
SP GR UR STRIP: 1.01 (ref 1–1.03)
TROPONIN T SERPL HS-MCNC: 17 NG/L
UROBILINOGEN UR QL STRIP: NORMAL
WBC NRBC COR # BLD AUTO: 8.47 10*3/MM3 (ref 3.4–10.8)
WHOLE BLOOD HOLD COAG: NORMAL
WHOLE BLOOD HOLD SPECIMEN: NORMAL

## 2024-09-07 PROCEDURE — 93005 ELECTROCARDIOGRAM TRACING: CPT | Performed by: EMERGENCY MEDICINE

## 2024-09-07 PROCEDURE — 84484 ASSAY OF TROPONIN QUANT: CPT | Performed by: EMERGENCY MEDICINE

## 2024-09-07 PROCEDURE — 81003 URINALYSIS AUTO W/O SCOPE: CPT | Performed by: EMERGENCY MEDICINE

## 2024-09-07 PROCEDURE — 70551 MRI BRAIN STEM W/O DYE: CPT

## 2024-09-07 PROCEDURE — 71045 X-RAY EXAM CHEST 1 VIEW: CPT

## 2024-09-07 PROCEDURE — 83605 ASSAY OF LACTIC ACID: CPT | Performed by: EMERGENCY MEDICINE

## 2024-09-07 PROCEDURE — 80053 COMPREHEN METABOLIC PANEL: CPT | Performed by: EMERGENCY MEDICINE

## 2024-09-07 PROCEDURE — 99285 EMERGENCY DEPT VISIT HI MDM: CPT

## 2024-09-07 PROCEDURE — 99284 EMERGENCY DEPT VISIT MOD MDM: CPT

## 2024-09-07 PROCEDURE — P9612 CATHETERIZE FOR URINE SPEC: HCPCS

## 2024-09-07 PROCEDURE — 85025 COMPLETE CBC W/AUTO DIFF WBC: CPT | Performed by: EMERGENCY MEDICINE

## 2024-09-07 PROCEDURE — 70450 CT HEAD/BRAIN W/O DYE: CPT

## 2024-09-07 PROCEDURE — 96374 THER/PROPH/DIAG INJ IV PUSH: CPT

## 2024-09-07 PROCEDURE — 25810000003 LACTATED RINGERS SOLUTION: Performed by: EMERGENCY MEDICINE

## 2024-09-07 PROCEDURE — 84145 PROCALCITONIN (PCT): CPT | Performed by: EMERGENCY MEDICINE

## 2024-09-07 PROCEDURE — 85610 PROTHROMBIN TIME: CPT | Performed by: EMERGENCY MEDICINE

## 2024-09-07 PROCEDURE — 87636 SARSCOV2 & INF A&B AMP PRB: CPT | Performed by: EMERGENCY MEDICINE

## 2024-09-07 RX ORDER — ATORVASTATIN CALCIUM 10 MG/1
10 TABLET, FILM COATED ORAL NIGHTLY
OUTPATIENT
Start: 2024-09-07

## 2024-09-07 RX ORDER — SODIUM CHLORIDE 0.9 % (FLUSH) 0.9 %
10 SYRINGE (ML) INJECTION EVERY 12 HOURS SCHEDULED
OUTPATIENT
Start: 2024-09-07

## 2024-09-07 RX ORDER — MECLIZINE HYDROCHLORIDE 25 MG/1
25 TABLET ORAL ONCE
Status: COMPLETED | OUTPATIENT
Start: 2024-09-07 | End: 2024-09-07

## 2024-09-07 RX ORDER — LABETALOL HYDROCHLORIDE 5 MG/ML
10 INJECTION, SOLUTION INTRAVENOUS ONCE
Status: COMPLETED | OUTPATIENT
Start: 2024-09-07 | End: 2024-09-07

## 2024-09-07 RX ORDER — MECLIZINE HYDROCHLORIDE 25 MG/1
25 TABLET ORAL 3 TIMES DAILY PRN
Qty: 15 TABLET | Refills: 0 | Status: SHIPPED | OUTPATIENT
Start: 2024-09-07 | End: 2024-09-10 | Stop reason: SDUPTHER

## 2024-09-07 RX ORDER — ASPIRIN 300 MG/1
300 SUPPOSITORY RECTAL DAILY
Status: DISCONTINUED | OUTPATIENT
Start: 2024-09-07 | End: 2024-09-07 | Stop reason: HOSPADM

## 2024-09-07 RX ORDER — SODIUM CHLORIDE 9 MG/ML
40 INJECTION, SOLUTION INTRAVENOUS AS NEEDED
OUTPATIENT
Start: 2024-09-07

## 2024-09-07 RX ORDER — SODIUM CHLORIDE 0.9 % (FLUSH) 0.9 %
10 SYRINGE (ML) INJECTION AS NEEDED
Status: DISCONTINUED | OUTPATIENT
Start: 2024-09-07 | End: 2024-09-07 | Stop reason: HOSPADM

## 2024-09-07 RX ORDER — SODIUM CHLORIDE 0.9 % (FLUSH) 0.9 %
10 SYRINGE (ML) INJECTION AS NEEDED
OUTPATIENT
Start: 2024-09-07

## 2024-09-07 RX ORDER — ASPIRIN 81 MG/1
81 TABLET, CHEWABLE ORAL DAILY
Status: DISCONTINUED | OUTPATIENT
Start: 2024-09-07 | End: 2024-09-07 | Stop reason: HOSPADM

## 2024-09-07 RX ADMIN — MECLIZINE HYDROCHLORIDE 25 MG: 25 TABLET ORAL at 20:40

## 2024-09-07 RX ADMIN — ASPIRIN 81 MG 81 MG: 81 TABLET ORAL at 18:18

## 2024-09-07 RX ADMIN — SODIUM CHLORIDE, POTASSIUM CHLORIDE, SODIUM LACTATE AND CALCIUM CHLORIDE 500 ML: 600; 310; 30; 20 INJECTION, SOLUTION INTRAVENOUS at 18:18

## 2024-09-07 RX ADMIN — Medication 10 MG: at 20:40

## 2024-09-07 NOTE — ED PROVIDER NOTES
Subjective   History of Present Illness  Patient is a 74-year-old female presenting to the emergency department with alteration in mental status, generalized weakness, and reported right-sided weakness/numbness.  Patient last known well on Wednesday per EMS.  EMS was called to the scene and activated a stroke alert.  Patient has a history of reported coronary disease, hypertension, and hyperlipidemia.  She is not on any chronic anticoagulation.  EMS reports some mild right facial weakness and numbness.  No fever or chills.  No chest pain or shortness of breath.  Patient does report decreased p.o. intake    History provided by:  Patient and EMS personnel      Review of Systems    Past Medical History:   Diagnosis Date    Acute angina     Anxiety     Hyperlipidemia     Hypertension     NSTEMI (non-ST elevated myocardial infarction)        Allergies   Allergen Reactions    Ace Inhibitors Swelling    Donepezil Other (See Comments)     nightmares       Past Surgical History:   Procedure Laterality Date    CYST REMOVAL      TUBAL ABDOMINAL LIGATION         Family History   Problem Relation Age of Onset    Liver disease Father     Breast cancer Daughter 52    Dementia Maternal Grandmother     Liver disease Other     Hypertension Other     Heart disease Other     Stroke Other     Heart attack Other     Ovarian cancer Neg Hx        Social History     Socioeconomic History    Marital status:    Tobacco Use    Smoking status: Former     Types: Cigarettes    Smokeless tobacco: Never   Vaping Use    Vaping status: Never Used   Substance and Sexual Activity    Alcohol use: Yes     Comment: occasional    Drug use: Never    Sexual activity: Not Currently           Objective   Physical Exam  Vitals and nursing note reviewed.   Constitutional:       General: She is not in acute distress.     Appearance: Normal appearance. She is not toxic-appearing.   Cardiovascular:      Rate and Rhythm: Normal rate and regular rhythm.       "Pulses: Normal pulses.   Pulmonary:      Effort: Pulmonary effort is normal. No respiratory distress.      Breath sounds: Normal breath sounds.   Abdominal:      Palpations: Abdomen is soft.   Skin:     General: Skin is warm and dry.   Neurological:      Mental Status: She is alert and oriented to person, place, and time.      Comments: Mild right facial asymmetry.  Finger-to-nose intact.  Speech intact.  No focal drift appreciated.  NIH stroke scale is a 1.   Psychiatric:         Mood and Affect: Mood normal.         Behavior: Behavior normal.         Procedures           ED Course  ED Course as of 09/08/24 0157   Sat Sep 07, 2024   1749 CT Head Without Contrast Stroke Protocol  Personally reviewed the CT images as they were being performed.  On my interpretation there is no hemorrhage or mass effect visualized. [RS]   1750 I evaluated the patient in the CT scanner with the stroke navigator.  Patient not a candidate for thrombolysis secondary Boykins to time since last known well.  We have ordered an MRI for further evaluation.  See their documentation for further details. [RS]   1834 XR Chest 1 View  Personally reviewed the single view of the chest.  On my interpretation there is no lobar infiltrate visualized. [RS]   2002 Patient is resting comfortably and reports she \"feels great\".  I talked with the patient and the family member at bedside.  At this point there is no clear indication for admission to the hospital.  However, we will have her ambulate for tolerance here in the emergency department to see if she will be safe for disposition back to her facility [RS]   2023 I spent a significant mount time talking to the patient's family over the phone.  Patient is able to ambulate.  She does report some dizziness when she turns her head.  No evidence of stroke.  Symptoms most consistent with peripheral associated dizziness and gait disturbance.  However, I did discuss the potential with her age and risk factors of " other sources.  At this point however, there is no evidence of any significant lab findings, imaging findings, or acute infection. I have reviewed results, considerations, and diagnosis with the patient and/or their representative. Anticipatory guidance provided. Follow-up plan reviewed. Precautions for acute return for re-evaluations also reviewed. This including potential for worsening of the presenting condition and need for further evaluation, admission, and/or intervention as indicated. Opportunity to as questions provided. I advised them to return for any concerns and stressed the importance of timely follow-up and outpatient services. They verbalized understanding.   [RS]   2025 Note to patient: The 21st Century Cures Act makes medical notes like these available to patients in the interest of transparency. However, be advised this is a medical document. It is intended as peer to peer communication. It is written in medical language and may contain abbreviations or verbiage that are unfamiliar. It may appear blunt or direct. Medical documents are intended to carry relevant information, facts as evident, and the clinical opinion of the physician/NPP.   [RS]      ED Course User Index  [RS] Mariano Grajeda MD                          Total (NIH Stroke Scale): 2                  Medical Decision Making  Problems Addressed:  Acute on chronic alteration in mental status: complicated acute illness or injury  Dizziness: complicated acute illness or injury  Elevated blood pressure reading with diagnosis of hypertension: complicated acute illness or injury  Gait disturbance: complicated acute illness or injury    Amount and/or Complexity of Data Reviewed  Labs: ordered.  Radiology: ordered. Decision-making details documented in ED Course.  ECG/medicine tests: ordered.    Risk  Prescription drug management.        Final diagnoses:   Gait disturbance   Elevated blood pressure reading with diagnosis of hypertension    Dizziness   Acute on chronic alteration in mental status       ED Disposition  ED Disposition       ED Disposition   Discharge    Condition   Stable    Comment   --               Ezekiel Vigil, APRN  [address]  [address]  [facility] [id number] [phone]    Schedule an appointment as soon as possible for a visit       [facility] EMERGENCY DEPARTMENT  [address] [address] [phone]    As needed, If symptoms worsen or ANY concerns.         Medication List        New Prescriptions      meclizine 25 MG tablet  Commonly known as: ANTIVERT  Take 1 tablet by mouth 3 (Three) Times a Day As Needed for Dizziness.               Where to Get Your Medications        These medications were sent to [facility]/pharmacy #[id number] - [place], [place] - [address] [place] [address] AT [place] [address] - [phone] [facility] [phone] [address] [address]      Hours: 24-hours Phone: [phone]   meclizine 25 MG tablet            Mariano Grajeda MD  09/08/24 0153

## 2024-09-07 NOTE — CONSULTS
"Stroke Consult Note    Patient Name: Carolyn Snowden   MRN: [id number]  Age: 74 y.o.  Sex: female  : 1950    Primary Care Physician: Ezekiel Vigil APRN  Referring Physician: Dr. Mariano Grajeda    TIME STROKE TEAM CALLED: 1716 EST     TIME PATIENT SEEN: 1720 EST    Handedness: Right  Race:     Chief Complaint/Reason for Consultation: Gait instability x 5 days, right facial droop, left lower extremity numbness    HPI: Mrs. Snowden is a 74-year-old female with known medical diagnoses of essential hypertension, hyperlipidemia, remote NSTEMI, Alzheimer's dementia (follows with Leola Andrews) and remote tobacco abuse who presents to the emergency department for further evaluation of gait instability x 5 days, right facial droop, and left lower extremity numbness.  The patient tells me that for the past few days she has not been feeling well and feels that her oral intake has been decreased.  She denies experiencing any overt unilateral weakness, visual disturbances, headache, or speech disturbances however has felt lightheaded and almost as if she were \"drunk\" when walking.  She denies any recent falls however tells me that she did have a syncopal episode approximately 1 month ago.    She tells me that she does not take any antiplatelet or anticoagulation medications.  She is a prior smoker (50 years ago) and denies EtOH use.    Last Known Normal Date/Time: 5 days ago EST     Review of Systems   Constitutional:  Positive for activity change and fatigue. Negative for fever.   HENT:  Negative for trouble swallowing.    Eyes:  Negative for photophobia and visual disturbance.   Respiratory: Negative.     Cardiovascular: Negative.  Negative for chest pain and palpitations.   Gastrointestinal:  Positive for diarrhea. Negative for blood in stool, nausea and vomiting.   Genitourinary: Negative.  Negative for hematuria.   Musculoskeletal:  Positive for gait problem.   Skin: Negative.    Neurological: "  Positive for weakness (Generalized), light-headedness and numbness. Negative for speech difficulty and headaches.   Psychiatric/Behavioral: Negative.        Past Medical History:   Diagnosis Date    Acute angina     Anxiety     Hyperlipidemia     Hypertension     NSTEMI (non-ST elevated myocardial infarction)      Past Surgical History:   Procedure Laterality Date    CYST REMOVAL      TUBAL ABDOMINAL LIGATION       Family History   Problem Relation Age of Onset    Liver disease Father     Breast cancer Daughter 52    Dementia Maternal Grandmother     Liver disease Other     Hypertension Other     Heart disease Other     Stroke Other     Heart attack Other     Ovarian cancer Neg Hx      Social History     Socioeconomic History    Marital status:    Tobacco Use    Smoking status: Former     Types: Cigarettes    Smokeless tobacco: Never   Vaping Use    Vaping status: Never Used   Substance and Sexual Activity    Alcohol use: Yes     Comment: occasional    Drug use: Never    Sexual activity: Not Currently     Allergies   Allergen Reactions    Ace Inhibitors Swelling    Donepezil Other (See Comments)     nightmares     Prior to Admission medications    Medication Sig Start Date End Date Taking? Authorizing Provider   atorvastatin (LIPITOR) 10 MG tablet Take 1 tablet by mouth Daily. 5/21/24   Ezekiel Vigil APRN   clonazePAM (KlonoPIN) 1 MG tablet Take 1 tablet by mouth 2 (Two) Times a Day As Needed.    Provider, MD Isiah   hydroCHLOROthiazide 12.5 MG tablet Take 1 tablet by mouth Daily. 8/7/24   Ezekiel Vigil APRN   memantine (NAMENDA XR) 28 MG capsule sustained-release 24 hr extended release capsule Take 1 capsule by mouth Every Night. 7/3/24   Leola Andrews APRN   triamcinolone (KENALOG) 0.1 % cream Apply 1 Application topically to the appropriate area as directed 2 (Two) Times a Day As Needed for Rash. 8/7/24   Ezekiel Vigil APRN   trimethoprim-polymyxin b (Polytrim) 73556-2.1  UNIT/ML-% ophthalmic solution Administer 1 drop to the right eye Every 4 (Four) Hours. 8/23/24   Ezekiel Vigil APRN            Neurological Exam  Mental Status  Alert. Oriented to person, place, time and situation. Oriented to person, place, and time. Speech is normal. Language is fluent with no aphasia. Attention and concentration are normal.    Cranial Nerves  CN II: Visual fields full to confrontation.  CN III, IV, VI: Extraocular movements intact bilaterally. Pupils equal round and reactive to light bilaterally.  CN V: Facial sensation is normal.  CN VII:  Right: There is central facial weakness.  CN VIII: Hearing appears to be intact bilateral.  CN XII: Tongue midline without atrophy or fasciculations.    Motor  Decreased muscle bulk throughout. Normal muscle tone.  Bilateral upper extremities with no drift, 4/5 strength  Bilateral lower extremities with no drift, 4/5 strength.    Sensory  Light touch abnormality: Left lower extremity only.     Coordination  Right: Finger-to-nose normal.Left: Finger-to-nose normal.    Gait   Abnormal gait.Casual gait: Narrow stance. Reduced stride length. Shuffling and ataxic gait.      Physical Exam  Vitals and nursing note reviewed.   Constitutional:       General: She is not in acute distress.     Appearance: Normal appearance. She is not ill-appearing.   HENT:      Head: Normocephalic and atraumatic.      Mouth/Throat:      Mouth: Mucous membranes are moist.   Eyes:      Extraocular Movements: Extraocular movements intact.      Pupils: Pupils are equal, round, and reactive to light.   Cardiovascular:      Rate and Rhythm: Normal rate.   Pulmonary:      Effort: Pulmonary effort is normal. No respiratory distress.      Comments: On room air  Skin:     General: Skin is warm and dry.   Neurological:      Mental Status: She is alert and oriented to person, place, and time.      Cranial Nerves: Cranial nerve deficit present.      Sensory: Sensory deficit present.       Motor: Weakness (Generalized) present.      Coordination: Coordination normal.      Gait: Gait abnormal.   Psychiatric:         Mood and Affect: Mood normal.         Speech: Speech normal.         Behavior: Behavior normal.         Acute Stroke Data    Thrombolytic Inclusion / Exclusion Criteria    Time: 17:33 EDT  Person Administering Scale: JEAN-CLAUDE Jaramillo    Inclusion Criteria  [x]   18 years of age or greater   []   Onset of symptoms < 4.5 hours before beginning treatment (stroke onset = time patient was last seen well or without symptoms).   []   Diagnosis of acute ischemic stroke causing measurable disabling deficit (Complete Hemianopia, Any Aphasia, Visual or Sensory Extinction, Any weakness limiting sustained effort against gravity)   []   Any remaining deficit considered potentially disabling in view of patient and practitioner   Exclusion criteria (Do not proceed with Alteplase if any are checked under exclusion criteria)  [x]   Onset unknown or GREATER than 4.5 hours   []   ICH on CT/MRI   []   CT demonstrates hypodensity representing acute or subacute infarct   []   Significant head trauma or prior stroke in the previous 3 months   []   Symptoms suggestive of subarachnoid hemorrhage   []   History of un-ruptured intracranial aneurysm GREATER than 10 mm   []   Recent intracranial or intraspinal surgery within the last 3 months   []   Arterial puncture at a non-compressible site in the previous 7 days   []   Active internal bleeding   []   Acute bleeding tendency   []   Platelet count LESS than 100,000 for known hematological diseases such as leukemia, thrombocytopenia or chronic cirrhosis   []   Current use of anticoagulant with INR GREATER than 1.7 or PT GREATER than 15 seconds, aPTT GREATER than 40 seconds   []   Heparin received within 48 hours, resulting in abnormally elevated aPTT GREATER than upper limit of normal   []   Current use of direct thrombin inhibitors or direct factor Xa  inhibitors in the past 48 hours   []   Elevated blood pressure refractory to treatment (systolic GREATER than 185 mm/Hg or diastolic  GREATER than 110 mm/Hg   []   Suspected infective endocarditis and aortic arch dissection   []   Current use of therapeutic treatment dose of low-molecular-weight heparin (LMWH) within the previous 24 hours   []   Structural GI malignancy or bleed   Relative exclusion for all patients  []   Only minor non-disabling symptoms   []   Pregnancy   []   Seizure at onset with postictal residual neurological impairments   []   Major surgery or previous trauma within past 14 days   []   History of previous spontaneous ICH, intracranial neoplasm, or AV malformation   []   Postpartum (within previous 14 days)   []   Recent GI or urinary tract hemorrhage (within previous 21 days)   []   Recent acute MI (within previous 3 months)   []   History of un-ruptured intracranial aneurysm LESS than 10 mm   []   History of ruptured intracranial aneurysm   []   Blood glucose LESS than 50 mg/dL (2.7 mmol/L)   []   Dural puncture within the last 7 days   []   Known GREATER than 10 cerebral microbleeds   Additional exclusions for patients with symptoms onset between 3 and 4.5 hours.  []   Age > 80.   []   On any anticoagulants regardless of INR  >>> Warfarin (Coumadin), Heparin, Enoxaparin (Lovenox), fondaparinux (Arixtra), bivalirudin (Angiomax), Argatroban, dabigatran (Pradaxa), rivaroxaban (Xarelto), or apixaban (Eliquis)   []   Severe stroke (NIHSS > 25).   []   History of BOTH diabetes and previous ischemic stroke.   []   The risks and benefits have been discussed with the patient or family related to the administration of IV thrombolytic therapy for stroke symptoms.   []   I have discussed and reviewed the patient's case and imaging with the attending prior to IV thrombolytic therapy.   N/A Time IV thrombolytic administered       Hospital Meds:  Scheduled-    Infusions-     PRNs-   sodium  chloride    Functional Status Prior to Current Stroke/Reading Score: 0    NIH Stroke Scale  Time: 17:33 EDT  Person Administering Scale: JEAN-CLAUDE Jaramillo    Interval: baseline  1a. Level of Consciousness: 0-->Alert, keenly responsive  1b. LOC Questions: 0-->Answers both questions correctly  1c. LOC Commands: 0-->Performs both tasks correctly  2. Best Gaze: 0-->Normal  3. Visual: 0-->No visual loss  4. Facial Palsy: 1-->Minor paralysis (flattened nasolabial fold, asymmetry on smiling)  5a. Motor Arm, Left: 0-->No drift, limb holds 90 (or 45) degrees for full 10 secs  5b. Motor Arm, Right: 0-->No drift, limb holds 90 (or 45) degrees for full 10 secs  6a. Motor Leg, Left: 0-->No drift, leg holds 30 degree position for full 5 secs  6b. Motor Leg, Right: 0-->No drift, leg holds 30 degree position for full 5 secs  7. Limb Ataxia: 0-->Absent  8. Sensory: 1-->Mild-to-moderate sensory loss, patient feels pinprick is less sharp or is dull on the affected side, or there is a loss of superficial pain with pinprick, but patient is aware of being touched  9. Best Language: 0-->No aphasia, normal  10. Dysarthria: 0-->Normal  11. Extinction and Inattention (formerly Neglect): 0-->No abnormality    Total (NIH Stroke Scale): 2      Results Reviewed:  I have personally reviewed current lab, radiology, and data and agree with results.    CT of the head without contrast is negative for hemorrhage in/or acute process.    Assessment/Plan:    This is a 74-year-old female with known medical diagnoses of essential hypertension, hyperlipidemia, remote NSTEMI, Alzheimer's dementia (follows with Leola Andrews), and remote tobacco abuse who presents to the emergency department for further evaluation of gait instability x 5 days, right facial droop, and left lower extremity numbness.  Given onset of symptoms >4.5 hours she is not a candidate for any acute stroke interventions.  Will likely require admission to the hospitalist service  for PT/OT evaluation and further stroke workup.    Antiplatelet PTA: None  Anticoagulant PTA: None        Gait abnormality, right facial droop, left lower extremity numbness  -Differential diagnoses include posterior stroke versus progression of Alzheimer's versus BPPV versus metabolic disarrangement  -Will obtain MRI of the brain without contrast  -N.p.o. until bedside nursing dysphagia screen completed  -TIA/CVA order set without thrombolytic therapy has been initiated; can de-escalate if MRI is negative for stroke  -A1c and lipid panel in a.m.  -TTE in a.m.  -Activity as tolerated, fall risk precautions  -PT/OT/SLP evaluation  -Recommend infectious workup    Essential hypertension  -Okay for normal blood pressure parameters given symptom onset was 5 days ago  -Goal 120-140/80-90  -Management per hospitalist    Hyperlipidemia  -Lipid panel in a.m.  -Continue atorvastatin 10 mg for now; if MRI is positive for stroke will need to increase to high intensity dose    4.  Alzheimer's dementia  -Patient follows with Leola Andrews as outpatient  -Continue Namenda    Plan of care was discussed with the patient and Dr. Grajeda.  She will be admitted to the hospitalist service for further workup.  Stroke neurology will continue to follow.  Please call with any questions or concerns.  Thank you for this consult.    Marta Prieto, APRN  September 7, 2024  17:33 EDT

## 2024-09-09 DIAGNOSIS — H10.31 ACUTE BACTERIAL CONJUNCTIVITIS OF RIGHT EYE: ICD-10-CM

## 2024-09-10 ENCOUNTER — OFFICE VISIT (OUTPATIENT)
Dept: INTERNAL MEDICINE | Facility: CLINIC | Age: 74
End: 2024-09-10
Payer: MEDICARE

## 2024-09-10 VITALS
WEIGHT: 129 LBS | TEMPERATURE: 97.2 F | HEIGHT: 61 IN | SYSTOLIC BLOOD PRESSURE: 120 MMHG | HEART RATE: 78 BPM | DIASTOLIC BLOOD PRESSURE: 78 MMHG | OXYGEN SATURATION: 99 % | BODY MASS INDEX: 24.35 KG/M2

## 2024-09-10 DIAGNOSIS — R42 DIZZINESS: ICD-10-CM

## 2024-09-10 DIAGNOSIS — I10 ESSENTIAL HYPERTENSION: ICD-10-CM

## 2024-09-10 DIAGNOSIS — F41.1 GENERALIZED ANXIETY DISORDER: ICD-10-CM

## 2024-09-10 DIAGNOSIS — Z00.00 MEDICARE ANNUAL WELLNESS VISIT, SUBSEQUENT: Primary | ICD-10-CM

## 2024-09-10 DIAGNOSIS — E78.00 PURE HYPERCHOLESTEROLEMIA: ICD-10-CM

## 2024-09-10 DIAGNOSIS — Z12.11 SCREENING FOR COLON CANCER: ICD-10-CM

## 2024-09-10 DIAGNOSIS — G30.1 MILD LATE ONSET ALZHEIMER'S DEMENTIA WITHOUT BEHAVIORAL DISTURBANCE, PSYCHOTIC DISTURBANCE, MOOD DISTURBANCE, OR ANXIETY: ICD-10-CM

## 2024-09-10 DIAGNOSIS — L81.9 CHANGE IN MULTIPLE PIGMENTED SKIN LESIONS: ICD-10-CM

## 2024-09-10 DIAGNOSIS — F02.A0 MILD LATE ONSET ALZHEIMER'S DEMENTIA WITHOUT BEHAVIORAL DISTURBANCE, PSYCHOTIC DISTURBANCE, MOOD DISTURBANCE, OR ANXIETY: ICD-10-CM

## 2024-09-10 DIAGNOSIS — Z29.11 NEED FOR RSV VACCINATION: ICD-10-CM

## 2024-09-10 RX ORDER — POLYMYXIN B SULFATE AND TRIMETHOPRIM 1; 10000 MG/ML; [USP'U]/ML
SOLUTION OPHTHALMIC
Qty: 10 ML | Refills: 0 | Status: SHIPPED | OUTPATIENT
Start: 2024-09-10

## 2024-09-10 RX ORDER — NIFEDIPINE 30 MG/1
30 TABLET, EXTENDED RELEASE ORAL DAILY
Qty: 90 TABLET | Refills: 0 | Status: SHIPPED | OUTPATIENT
Start: 2024-09-10

## 2024-09-10 RX ORDER — MECLIZINE HYDROCHLORIDE 25 MG/1
TABLET ORAL
Qty: 20 TABLET | Refills: 1 | Status: SHIPPED | OUTPATIENT
Start: 2024-09-10

## 2024-09-10 NOTE — PROGRESS NOTES
Subjective   The ABCs of the Annual Wellness Visit  Medicare Wellness Visit      Carolyn Snowden is a 74 y.o. patient who presents for a Medicare Wellness Visit.    The following portions of the patient's history were reviewed and   updated as appropriate: allergies, current medications, past family history, past medical history, past social history, past surgical history, and problem list.    Compared to one year ago, the patient's physical   health is worse.  Compared to one year ago, the patient's mental   health is worse.    Recent Hospitalizations:  She was admitted within the past 365 days at [facility].     Current Medical Providers:  Patient Care Team:  Ezeikel Vigil APRN as PCP - General (Family Medicine)    Outpatient Medications Prior to Visit   Medication Sig Dispense Refill    atorvastatin (LIPITOR) 10 MG tablet Take 1 tablet by mouth Daily. 90 tablet 4    clonazePAM (KlonoPIN) 1 MG tablet Take 1 tablet by mouth 2 (Two) Times a Day As Needed.      hydroCHLOROthiazide 12.5 MG tablet Take 1 tablet by mouth Daily. 30 tablet 5    memantine (NAMENDA XR) 28 MG capsule sustained-release 24 hr extended release capsule Take 1 capsule by mouth Every Night. 90 capsule 3    NIFEdipine XL (PROCARDIA XL) 30 MG 24 hr tablet TAKE 1 TABLET BY MOUTH EVERY DAY 90 tablet 0    triamcinolone (KENALOG) 0.1 % cream Apply 1 Application topically to the appropriate area as directed 2 (Two) Times a Day As Needed for Rash. 453 g 1    trimethoprim-polymyxin b (POLYTRIM) 97380-0.1 UNIT/ML-% ophthalmic solution ADMINISTER 1 DROP TO THE RIGHT EYE EVERY 4 HOURS. 10 mL 0    meclizine (ANTIVERT) 25 MG tablet Take 1 tablet by mouth 3 (Three) Times a Day As Needed for Dizziness. 15 tablet 0     No facility-administered medications prior to visit.     No opioid medication identified on active medication list. I have reviewed chart for other potential  high risk medication/s and harmful drug interactions in the  "elderly.      Aspirin is not on active medication list.  Aspirin use is not indicated based on review of current medical condition/s. Risk of harm outweighs potential benefits.  .    Patient Active Problem List   Diagnosis    Atopic rhinitis    Diverticulitis of intestine    Gastroesophageal reflux disease    Essential hypertension    REM sleep behavior disorder    Anxiety and depression    Pure hypercholesterolemia    Arthritis    REM sleep behavior disorder    Esophageal reflux    Essential hypertension    Intrinsic asthma without status asthmaticus without complication    Mild cognitive impairment with memory loss    Mild late onset Alzheimer's dementia without behavioral disturbance, psychotic disturbance, mood disturbance, or anxiety    NSTEMI (non-ST elevated myocardial infarction)     Advance Care Planning Advance Directive is not on file.  ACP discussion was held with the patient during this visit. Patient does not have an advance directive, information provided.            Objective   Vitals:    09/10/24 1310   BP: 120/78   BP Location: Left arm   Patient Position: Sitting   Pulse: 78   Temp: 97.2 °F (36.2 °C)   SpO2: 99%   Weight: 58.5 kg (129 lb)   Height: 154.9 cm (61\")       Estimated body mass index is 24.37 kg/m² as calculated from the following:    Height as of this encounter: 154.9 cm (61\").    Weight as of this encounter: 58.5 kg (129 lb).    BMI is within normal parameters. No other follow-up for BMI required.       Does the patient have evidence of cognitive impairment? Yes                                                                                               Health  Risk Assessment    Smoking Status:  Social History     Tobacco Use   Smoking Status Former    Types: Cigarettes   Smokeless Tobacco Never     Alcohol Consumption:  Social History     Substance and Sexual Activity   Alcohol Use Yes    Comment: occasional       Fall Risk Screen  STEADI Fall Risk Assessment was completed, and " patient is at LOW risk for falls.Assessment completed on:9/10/2024    Depression Screenin/10/2024     1:13 PM   PHQ-2/PHQ-9 Depression Screening   Little Interest or Pleasure in Doing Things 0-->not at all   Feeling Down, Depressed or Hopeless 0-->not at all   PHQ-9: Brief Depression Severity Measure Score 0     Health Habits and Functional and Cognitive Screenin/10/2024     1:00 PM   Functional & Cognitive Status   Do you have difficulty preparing food and eating? No   Do you have difficulty bathing yourself, getting dressed or grooming yourself? No   Do you have difficulty using the toilet? No   Do you have trouble with steps or getting out of a bed or a chair? Yes   Current Diet Well Balanced Diet   Dental Exam Not up to date   Eye Exam Not up to date   Exercise (times per week) 0 times per week   Current Exercises Include No Regular Exercise   Do you need help using the phone?  No   Are you deaf or do you have serious difficulty hearing?  No   Do you need help to go to places out of walking distance? No   Do you need help shopping? No   Do you need help preparing meals?  No   Do you need help with housework?  No   Do you need help with laundry? No   Do you need help taking your medications? Yes   Do you need help managing money? Yes   Do you ever drive or ride in a car without wearing a seat belt? No   Have you felt unusual stress, anger or loneliness in the last month? No   Who do you live with? Child   If you need help, do you have trouble finding someone available to you? No   Have you been bothered in the last four weeks by sexual problems? No   Do you have difficulty concentrating, remembering or making decisions? No           Age-appropriate Screening Schedule:  Refer to the list below for future screening recommendations based on patient's age, sex and/or medical conditions. Orders for these recommended tests are listed in the plan section. The patient has been provided with a written  plan.    Health Maintenance List  Health Maintenance   Topic Date Due    RSV Vaccine - Adults (1 - 1-dose 60+ series) Never done    ZOSTER VACCINE (2 of 2) 04/23/2018    TDAP/TD VACCINES (2 - Td or Tdap) 07/01/2021    LIPID PANEL  08/21/2024    COVID-19 Vaccine (4 - 2023-24 season) 09/01/2024    INFLUENZA VACCINE  08/01/2024    DXA SCAN  09/10/2024 (Originally 12/1/2018)    Pneumococcal Vaccine 65+ (1 of 2 - PCV) 08/23/2025 (Originally 8/2/1956)    ANNUAL WELLNESS VISIT  09/10/2025    HEPATITIS C SCREENING  Completed    COLORECTAL CANCER SCREENING  Discontinued                                                                                                                                                CMS Preventative Services Quick Reference  Risk Factors Identified During Encounter  Depression/Dysphoria:  pt appears stable without treatment  Fall Risk-High or Moderate: Discussed Fall Prevention in the home  Immunizations Discussed/Encouraged: Influenza, Shingrix, and RSV (Respiratory Syncytial Virus)    The above risks/problems have been discussed with the patient.  Pertinent information has been shared with the patient in the After Visit Summary.  An After Visit Summary and PPPS were made available to the patient.    Follow Up:   Next Medicare Wellness visit to be scheduled in 1 year.     Assessment & Plan  Medicare annual wellness visit, subsequent    Change in multiple pigmented skin lesions    Need for RSV vaccination    Screening for colon cancer    Essential hypertension  Hypertension is stable and controlled  Continue current treatment regimen.  Blood pressure will be reassessed in 3 months.  Mild late onset Alzheimer's dementia without behavioral disturbance, psychotic disturbance, mood disturbance, or anxiety  Psychological condition is worsening.  Continue current treatment regimen.  Psychological condition  will be reassessed in 3 months.  Pure hypercholesterolemia   Lipid abnormalities are  stable    Plan:  Continue same medication/s without change.      Discussed medication dosage, use, side effects, and goals of treatment in detail.    Counseled patient on lifestyle modifications to help control hyperlipidemia.     Patient Treatment Goals:   LDL goal is under 100    Followup in 3 months.  Dizziness    Generalized anxiety disorder  Psychological condition is stable.  Continue current treatment regimen.  Psychological condition  will be reassessed in 3 months.    Orders Placed This Encounter   Procedures    Ambulatory Referral to Dermatology    Ambulatory Referral For Screening Colonoscopy     New Medications Ordered This Visit   Medications    RSVPreF3 Vac Recomb Adjuvanted (AREXVY) 120 MCG/0.5ML reconstituted suspension injection     Sig: Inject 0.5 mL into the appropriate muscle as directed by prescriber 1 (One) Time for 1 dose.     Dispense:  0.5 mL     Refill:  0    meclizine (ANTIVERT) 25 MG tablet     Sig: Take 0.5-1 tablet PO three dimes daily as needed for dizziness     Dispense:  20 tablet     Refill:  1          Follow Up:   Return in about 3 months (around 12/10/2024) for Alzheimers, HTN.

## 2024-09-10 NOTE — ASSESSMENT & PLAN NOTE
Psychological condition is worsening.  Continue current treatment regimen.  Psychological condition  will be reassessed in 3 months.

## 2024-10-02 ENCOUNTER — HOSPITAL ENCOUNTER (OUTPATIENT)
Facility: HOSPITAL | Age: 74
Setting detail: OBSERVATION
Discharge: HOME | End: 2024-10-05
Attending: EMERGENCY MEDICINE | Admitting: INTERNAL MEDICINE
Payer: MEDICARE

## 2024-10-02 ENCOUNTER — APPOINTMENT (OUTPATIENT)
Dept: GENERAL RADIOLOGY | Facility: HOSPITAL | Age: 74
End: 2024-10-02
Payer: MEDICARE

## 2024-10-02 DIAGNOSIS — R19.7 DIARRHEA, UNSPECIFIED TYPE: ICD-10-CM

## 2024-10-02 DIAGNOSIS — R07.9 CHEST PAIN, UNSPECIFIED TYPE: ICD-10-CM

## 2024-10-02 DIAGNOSIS — R06.02 SHORTNESS OF BREATH: ICD-10-CM

## 2024-10-02 DIAGNOSIS — R53.1 GENERALIZED WEAKNESS: Primary | ICD-10-CM

## 2024-10-02 LAB
ALBUMIN SERPL-MCNC: 4.8 G/DL (ref 3.5–5.2)
ALBUMIN/GLOB SERPL: 1.5 G/DL
ALP SERPL-CCNC: 63 U/L (ref 39–117)
ALT SERPL W P-5'-P-CCNC: 13 U/L (ref 1–33)
ANION GAP SERPL CALCULATED.3IONS-SCNC: 14 MMOL/L (ref 5–15)
ARTERIAL PATENCY WRIST A: ABNORMAL
AST SERPL-CCNC: 19 U/L (ref 1–32)
ATMOSPHERIC PRESS: ABNORMAL MM[HG]
BASE EXCESS BLDA CALC-SCNC: -1.3 MMOL/L (ref 0–2)
BASOPHILS # BLD AUTO: 0.03 10*3/MM3 (ref 0–0.2)
BASOPHILS NFR BLD AUTO: 0.3 % (ref 0–1.5)
BDY SITE: ABNORMAL
BILIRUB SERPL-MCNC: 0.5 MG/DL (ref 0–1.2)
BODY TEMPERATURE: 37
BUN SERPL-MCNC: 16 MG/DL (ref 8–23)
BUN/CREAT SERPL: 15.2 (ref 7–25)
CALCIUM SPEC-SCNC: 9.9 MG/DL (ref 8.6–10.5)
CHLORIDE SERPL-SCNC: 104 MMOL/L (ref 98–107)
CO2 BLDA-SCNC: 18.3 MMOL/L (ref 22–33)
CO2 SERPL-SCNC: 25 MMOL/L (ref 22–29)
COHGB MFR BLD: 0.9 % (ref 0–2)
CREAT SERPL-MCNC: 1.05 MG/DL (ref 0.57–1)
D-LACTATE SERPL-SCNC: 1.5 MMOL/L (ref 0.5–2)
DEPRECATED RDW RBC AUTO: 46.6 FL (ref 37–54)
DEVELOPER EXPIRATION DATE: NORMAL
DEVELOPER LOT NUMBER: NORMAL
EGFRCR SERPLBLD CKD-EPI 2021: 55.2 ML/MIN/1.73
EOSINOPHIL # BLD AUTO: 0.04 10*3/MM3 (ref 0–0.4)
EOSINOPHIL NFR BLD AUTO: 0.4 % (ref 0.3–6.2)
EPAP: 0
ERYTHROCYTE [DISTWIDTH] IN BLOOD BY AUTOMATED COUNT: 15 % (ref 12.3–15.4)
EXPIRATION DATE: NORMAL
FECAL OCCULT BLOOD SCREEN, POC: NEGATIVE
FLUAV RNA RESP QL NAA+PROBE: NOT DETECTED
FLUBV RNA RESP QL NAA+PROBE: NOT DETECTED
GLOBULIN UR ELPH-MCNC: 3.3 GM/DL
GLUCOSE SERPL-MCNC: 99 MG/DL (ref 65–99)
HCO3 BLDA-SCNC: 17.8 MMOL/L (ref 20–26)
HCT VFR BLD AUTO: 42.4 % (ref 34–46.6)
HCT VFR BLD CALC: 38.9 % (ref 38–51)
HGB BLD-MCNC: 13.6 G/DL (ref 12–15.9)
HGB BLDA-MCNC: 12.7 G/DL (ref 14–18)
IMM GRANULOCYTES # BLD AUTO: 0.03 10*3/MM3 (ref 0–0.05)
IMM GRANULOCYTES NFR BLD AUTO: 0.3 % (ref 0–0.5)
INHALED O2 CONCENTRATION: 21 %
IPAP: 0
LYMPHOCYTES # BLD AUTO: 0.74 10*3/MM3 (ref 0.7–3.1)
LYMPHOCYTES NFR BLD AUTO: 6.6 % (ref 19.6–45.3)
Lab: ABNORMAL
Lab: NORMAL
MCH RBC QN AUTO: 27.1 PG (ref 26.6–33)
MCHC RBC AUTO-ENTMCNC: 32.1 G/DL (ref 31.5–35.7)
MCV RBC AUTO: 84.5 FL (ref 79–97)
METHGB BLD QL: 0.4 % (ref 0–1.5)
MODALITY: ABNORMAL
MONOCYTES # BLD AUTO: 0.49 10*3/MM3 (ref 0.1–0.9)
MONOCYTES NFR BLD AUTO: 4.4 % (ref 5–12)
NEGATIVE CONTROL: NEGATIVE
NEUTROPHILS NFR BLD AUTO: 88 % (ref 42.7–76)
NEUTROPHILS NFR BLD AUTO: 9.91 10*3/MM3 (ref 1.7–7)
NOTIFIED BY: ABNORMAL
NOTIFIED WHO: ABNORMAL
NRBC BLD AUTO-RTO: 0 /100 WBC (ref 0–0.2)
OXYHGB MFR BLDV: 98.4 % (ref 94–99)
PAW @ PEAK INSP FLOW SETTING VENT: 0 CMH2O
PCO2 BLDA: 17.7 MM HG (ref 35–45)
PCO2 TEMP ADJ BLD: 17.7 MM HG (ref 35–45)
PH BLDA: 7.61 PH UNITS (ref 7.35–7.45)
PH, TEMP CORRECTED: 7.61 PH UNITS
PLATELET # BLD AUTO: 255 10*3/MM3 (ref 140–450)
PMV BLD AUTO: 11.2 FL (ref 6–12)
PO2 BLDA: 107 MM HG (ref 83–108)
PO2 TEMP ADJ BLD: 107 MM HG (ref 83–108)
POSITIVE CONTROL: POSITIVE
POTASSIUM SERPL-SCNC: 3.7 MMOL/L (ref 3.5–5.2)
PROT SERPL-MCNC: 8.1 G/DL (ref 6–8.5)
RBC # BLD AUTO: 5.02 10*6/MM3 (ref 3.77–5.28)
SARS-COV-2 RNA RESP QL NAA+PROBE: NOT DETECTED
SODIUM SERPL-SCNC: 143 MMOL/L (ref 136–145)
TOTAL RATE: 0 BREATHS/MINUTE
TROPONIN T SERPL HS-MCNC: 15 NG/L
WBC NRBC COR # BLD AUTO: 11.24 10*3/MM3 (ref 3.4–10.8)

## 2024-10-02 PROCEDURE — 25010000002 ONDANSETRON PER 1 MG: Performed by: EMERGENCY MEDICINE

## 2024-10-02 PROCEDURE — 83050 HGB METHEMOGLOBIN QUAN: CPT

## 2024-10-02 PROCEDURE — 82375 ASSAY CARBOXYHB QUANT: CPT

## 2024-10-02 PROCEDURE — 96375 TX/PRO/DX INJ NEW DRUG ADDON: CPT

## 2024-10-02 PROCEDURE — 93005 ELECTROCARDIOGRAM TRACING: CPT | Performed by: EMERGENCY MEDICINE

## 2024-10-02 PROCEDURE — 25010000002 KETOROLAC TROMETHAMINE PER 15 MG: Performed by: EMERGENCY MEDICINE

## 2024-10-02 PROCEDURE — 80053 COMPREHEN METABOLIC PANEL: CPT | Performed by: EMERGENCY MEDICINE

## 2024-10-02 PROCEDURE — 82270 OCCULT BLOOD FECES: CPT | Performed by: EMERGENCY MEDICINE

## 2024-10-02 PROCEDURE — 85025 COMPLETE CBC W/AUTO DIFF WBC: CPT | Performed by: EMERGENCY MEDICINE

## 2024-10-02 PROCEDURE — 82805 BLOOD GASES W/O2 SATURATION: CPT

## 2024-10-02 PROCEDURE — 99285 EMERGENCY DEPT VISIT HI MDM: CPT

## 2024-10-02 PROCEDURE — 25810000003 SODIUM CHLORIDE 0.9 % SOLUTION: Performed by: EMERGENCY MEDICINE

## 2024-10-02 PROCEDURE — 36415 COLL VENOUS BLD VENIPUNCTURE: CPT

## 2024-10-02 PROCEDURE — 36600 WITHDRAWAL OF ARTERIAL BLOOD: CPT

## 2024-10-02 PROCEDURE — 87636 SARSCOV2 & INF A&B AMP PRB: CPT | Performed by: EMERGENCY MEDICINE

## 2024-10-02 PROCEDURE — 96374 THER/PROPH/DIAG INJ IV PUSH: CPT

## 2024-10-02 PROCEDURE — 87507 IADNA-DNA/RNA PROBE TQ 12-25: CPT | Performed by: EMERGENCY MEDICINE

## 2024-10-02 PROCEDURE — 84484 ASSAY OF TROPONIN QUANT: CPT | Performed by: EMERGENCY MEDICINE

## 2024-10-02 PROCEDURE — 83605 ASSAY OF LACTIC ACID: CPT | Performed by: EMERGENCY MEDICINE

## 2024-10-02 PROCEDURE — 71045 X-RAY EXAM CHEST 1 VIEW: CPT

## 2024-10-02 RX ORDER — KETOROLAC TROMETHAMINE 15 MG/ML
10 INJECTION, SOLUTION INTRAMUSCULAR; INTRAVENOUS ONCE
Status: COMPLETED | OUTPATIENT
Start: 2024-10-02 | End: 2024-10-02

## 2024-10-02 RX ORDER — ONDANSETRON 2 MG/ML
4 INJECTION INTRAMUSCULAR; INTRAVENOUS ONCE
Status: COMPLETED | OUTPATIENT
Start: 2024-10-02 | End: 2024-10-02

## 2024-10-02 RX ADMIN — ONDANSETRON 4 MG: 2 INJECTION INTRAMUSCULAR; INTRAVENOUS at 23:00

## 2024-10-02 RX ADMIN — KETOROLAC TROMETHAMINE 10 MG: 15 INJECTION, SOLUTION INTRAMUSCULAR; INTRAVENOUS at 23:23

## 2024-10-02 RX ADMIN — SODIUM CHLORIDE 1000 ML: 9 INJECTION, SOLUTION INTRAVENOUS at 23:00

## 2024-10-02 NOTE — Clinical Note
Level of Care: Telemetry [5]   Diagnosis: Diarrhea in adult patient [[id number]]   Admitting Physician: SHILOH BERRY III [[id number]   Attending Physician: SHILOH BERRY III [[id number]]   Bed Request Comments: tele obs not cdu

## 2024-10-03 ENCOUNTER — APPOINTMENT (OUTPATIENT)
Dept: CT IMAGING | Facility: HOSPITAL | Age: 74
End: 2024-10-03
Payer: MEDICARE

## 2024-10-03 PROBLEM — E87.3 RESPIRATORY ALKALOSIS: Status: ACTIVE | Noted: 2024-10-03

## 2024-10-03 PROBLEM — K62.89 PROCTITIS: Status: ACTIVE | Noted: 2024-10-03

## 2024-10-03 PROBLEM — R19.7 DIARRHEA: Status: ACTIVE | Noted: 2024-10-03

## 2024-10-03 LAB
ADV 40+41 DNA STL QL NAA+NON-PROBE: NOT DETECTED
ASTRO TYP 1-8 RNA STL QL NAA+NON-PROBE: NOT DETECTED
BACTERIA UR QL AUTO: ABNORMAL /HPF
BILIRUB UR QL STRIP: NEGATIVE
C CAYETANENSIS DNA STL QL NAA+NON-PROBE: NOT DETECTED
C COLI+JEJ+UPSA DNA STL QL NAA+NON-PROBE: NOT DETECTED
C DIFF TOX GENS STL QL NAA+PROBE: NOT DETECTED
CLARITY UR: CLEAR
COLOR UR: YELLOW
CRYPTOSP DNA STL QL NAA+NON-PROBE: NOT DETECTED
E HISTOLYT DNA STL QL NAA+NON-PROBE: NOT DETECTED
EAEC PAA PLAS AGGR+AATA ST NAA+NON-PRB: NOT DETECTED
EC STX1+STX2 GENES STL QL NAA+NON-PROBE: NOT DETECTED
EPEC EAE GENE STL QL NAA+NON-PROBE: NOT DETECTED
ETEC LTA+ST1A+ST1B TOX ST NAA+NON-PROBE: NOT DETECTED
G LAMBLIA DNA STL QL NAA+NON-PROBE: NOT DETECTED
GEN 5 2HR TROPONIN T REFLEX: 13 NG/L
GLUCOSE UR STRIP-MCNC: NEGATIVE MG/DL
HGB UR QL STRIP.AUTO: NEGATIVE
HYALINE CASTS UR QL AUTO: ABNORMAL /LPF
KETONES UR QL STRIP: ABNORMAL
LEUKOCYTE ESTERASE UR QL STRIP.AUTO: ABNORMAL
NITRITE UR QL STRIP: NEGATIVE
NOROVIRUS GI+II RNA STL QL NAA+NON-PROBE: DETECTED
P SHIGELLOIDES DNA STL QL NAA+NON-PROBE: NOT DETECTED
PH UR STRIP.AUTO: 7.5 [PH] (ref 5–8)
PROCALCITONIN SERPL-MCNC: 0.1 NG/ML (ref 0–0.25)
PROT UR QL STRIP: NEGATIVE
QT INTERVAL: 372 MS
QTC INTERVAL: 477 MS
RBC # UR STRIP: ABNORMAL /HPF
REF LAB TEST METHOD: ABNORMAL
RVA RNA STL QL NAA+NON-PROBE: NOT DETECTED
S ENT+BONG DNA STL QL NAA+NON-PROBE: NOT DETECTED
SALICYLATES SERPL-MCNC: <0.3 MG/DL
SAPO I+II+IV+V RNA STL QL NAA+NON-PROBE: NOT DETECTED
SHIGELLA SP+EIEC IPAH ST NAA+NON-PROBE: NOT DETECTED
SP GR UR STRIP: 1.02 (ref 1–1.03)
SQUAMOUS #/AREA URNS HPF: ABNORMAL /HPF
TROPONIN T DELTA: -2 NG/L
UROBILINOGEN UR QL STRIP: ABNORMAL
V CHOL+PARA+VUL DNA STL QL NAA+NON-PROBE: NOT DETECTED
V CHOLERAE DNA STL QL NAA+NON-PROBE: NOT DETECTED
WBC # UR STRIP: ABNORMAL /HPF
Y ENTEROCOL DNA STL QL NAA+NON-PROBE: NOT DETECTED

## 2024-10-03 PROCEDURE — G0378 HOSPITAL OBSERVATION PER HR: HCPCS

## 2024-10-03 PROCEDURE — 25510000001 IOPAMIDOL PER 1 ML: Performed by: EMERGENCY MEDICINE

## 2024-10-03 PROCEDURE — 84145 PROCALCITONIN (PCT): CPT | Performed by: EMERGENCY MEDICINE

## 2024-10-03 PROCEDURE — 87086 URINE CULTURE/COLONY COUNT: CPT | Performed by: EMERGENCY MEDICINE

## 2024-10-03 PROCEDURE — 80179 DRUG ASSAY SALICYLATE: CPT | Performed by: PHYSICIAN ASSISTANT

## 2024-10-03 PROCEDURE — 25810000003 SODIUM CHLORIDE 0.9 % SOLUTION: Performed by: EMERGENCY MEDICINE

## 2024-10-03 PROCEDURE — 81001 URINALYSIS AUTO W/SCOPE: CPT | Performed by: EMERGENCY MEDICINE

## 2024-10-03 PROCEDURE — 87493 C DIFF AMPLIFIED PROBE: CPT | Performed by: PHYSICIAN ASSISTANT

## 2024-10-03 PROCEDURE — 87040 BLOOD CULTURE FOR BACTERIA: CPT | Performed by: EMERGENCY MEDICINE

## 2024-10-03 PROCEDURE — 84484 ASSAY OF TROPONIN QUANT: CPT | Performed by: EMERGENCY MEDICINE

## 2024-10-03 PROCEDURE — 71275 CT ANGIOGRAPHY CHEST: CPT

## 2024-10-03 PROCEDURE — 97535 SELF CARE MNGMENT TRAINING: CPT

## 2024-10-03 PROCEDURE — 74177 CT ABD & PELVIS W/CONTRAST: CPT

## 2024-10-03 PROCEDURE — P9612 CATHETERIZE FOR URINE SPEC: HCPCS

## 2024-10-03 PROCEDURE — 99223 1ST HOSP IP/OBS HIGH 75: CPT | Performed by: INTERNAL MEDICINE

## 2024-10-03 PROCEDURE — 70450 CT HEAD/BRAIN W/O DYE: CPT

## 2024-10-03 PROCEDURE — 97166 OT EVAL MOD COMPLEX 45 MIN: CPT

## 2024-10-03 RX ORDER — IOPAMIDOL 755 MG/ML
90 INJECTION, SOLUTION INTRAVASCULAR
Status: COMPLETED | OUTPATIENT
Start: 2024-10-03 | End: 2024-10-03

## 2024-10-03 RX ORDER — SODIUM CHLORIDE 0.9 % (FLUSH) 0.9 %
10 SYRINGE (ML) INJECTION AS NEEDED
Status: DISCONTINUED | OUTPATIENT
Start: 2024-10-03 | End: 2024-10-05 | Stop reason: HOSPADM

## 2024-10-03 RX ORDER — ATORVASTATIN CALCIUM 10 MG/1
10 TABLET, FILM COATED ORAL NIGHTLY
Status: DISCONTINUED | OUTPATIENT
Start: 2024-10-03 | End: 2024-10-03

## 2024-10-03 RX ORDER — SODIUM CHLORIDE 9 MG/ML
40 INJECTION, SOLUTION INTRAVENOUS AS NEEDED
Status: DISCONTINUED | OUTPATIENT
Start: 2024-10-03 | End: 2024-10-03

## 2024-10-03 RX ORDER — SODIUM CHLORIDE 0.9 % (FLUSH) 0.9 %
10 SYRINGE (ML) INJECTION EVERY 12 HOURS SCHEDULED
Status: DISCONTINUED | OUTPATIENT
Start: 2024-10-03 | End: 2024-10-05 | Stop reason: HOSPADM

## 2024-10-03 RX ORDER — NIFEDIPINE 30 MG/1
30 TABLET, EXTENDED RELEASE ORAL DAILY
Status: DISCONTINUED | OUTPATIENT
Start: 2024-10-03 | End: 2024-10-03

## 2024-10-03 RX ORDER — SODIUM CHLORIDE 9 MG/ML
40 INJECTION, SOLUTION INTRAVENOUS AS NEEDED
Status: DISCONTINUED | OUTPATIENT
Start: 2024-10-03 | End: 2024-10-05 | Stop reason: HOSPADM

## 2024-10-03 RX ORDER — SODIUM CHLORIDE 0.9 % (FLUSH) 0.9 %
10 SYRINGE (ML) INJECTION EVERY 12 HOURS SCHEDULED
Status: DISCONTINUED | OUTPATIENT
Start: 2024-10-03 | End: 2024-10-03

## 2024-10-03 RX ORDER — HYDROCHLOROTHIAZIDE 12.5 MG/1
12.5 TABLET ORAL DAILY
Status: CANCELLED | OUTPATIENT
Start: 2024-10-03

## 2024-10-03 RX ORDER — SODIUM CHLORIDE 0.9 % (FLUSH) 0.9 %
10 SYRINGE (ML) INJECTION AS NEEDED
Status: DISCONTINUED | OUTPATIENT
Start: 2024-10-03 | End: 2024-10-03

## 2024-10-03 RX ORDER — ACETAMINOPHEN 325 MG/1
650 TABLET ORAL EVERY 4 HOURS PRN
Status: DISCONTINUED | OUTPATIENT
Start: 2024-10-03 | End: 2024-10-05 | Stop reason: HOSPADM

## 2024-10-03 RX ORDER — ONDANSETRON 4 MG/1
4 TABLET, ORALLY DISINTEGRATING ORAL EVERY 6 HOURS PRN
Status: DISCONTINUED | OUTPATIENT
Start: 2024-10-03 | End: 2024-10-05 | Stop reason: HOSPADM

## 2024-10-03 RX ORDER — ONDANSETRON 2 MG/ML
4 INJECTION INTRAMUSCULAR; INTRAVENOUS EVERY 6 HOURS PRN
Status: DISCONTINUED | OUTPATIENT
Start: 2024-10-03 | End: 2024-10-05 | Stop reason: HOSPADM

## 2024-10-03 RX ORDER — ACETAMINOPHEN 160 MG/5ML
650 SOLUTION ORAL EVERY 4 HOURS PRN
Status: DISCONTINUED | OUTPATIENT
Start: 2024-10-03 | End: 2024-10-05 | Stop reason: HOSPADM

## 2024-10-03 RX ORDER — ACETAMINOPHEN 650 MG/1
650 SUPPOSITORY RECTAL EVERY 4 HOURS PRN
Status: DISCONTINUED | OUTPATIENT
Start: 2024-10-03 | End: 2024-10-05 | Stop reason: HOSPADM

## 2024-10-03 RX ORDER — SODIUM CHLORIDE 9 MG/ML
125 INJECTION, SOLUTION INTRAVENOUS CONTINUOUS
Status: DISCONTINUED | OUTPATIENT
Start: 2024-10-03 | End: 2024-10-03

## 2024-10-03 RX ADMIN — IOPAMIDOL 90 ML: 755 INJECTION, SOLUTION INTRAVENOUS at 01:19

## 2024-10-03 RX ADMIN — SODIUM CHLORIDE 1000 ML: 9 INJECTION, SOLUTION INTRAVENOUS at 01:37

## 2024-10-03 NOTE — PROGRESS NOTES
[facility] Medicine Services  ADMISSION FOLLOW-UP NOTE          Patient admitted after midnight, H&P by my partner performed earlier on today's date reviewed.  Interim findings, labs, and charting also reviewed.        The [facility] Hospital Problem List has been managed and updated to include any new diagnoses:  Active Hospital Problems    Diagnosis  POA    Diarrhea [R19.7]  Yes    Respiratory alkalosis [E87.3]  Yes    Pure hypercholesterolemia [E78.00]  Yes    Anxiety and depression [F41.9, F32.A]  Yes    Essential hypertension [I10]  Yes      Resolved Hospital Problems   No resolved problems to display.     In summary, this is a 75 yo female w anxiety/depression who presented with acute nausea/vomiting/diarrhea. Found to have norovirus and resp alkalosis as well    ADDITIONAL PLAN:  - appears hyperventilation much improved on exam  - NS to help w current alkalosis and hypovolemic state  - supportive care for norovirus  - hopefully home 10/4 or 10/5    Expected Discharge 10/5 (Discharge date is tentative pending patient's medical condition and is subject to change)  Expected Discharge Date: 10/5/2024; Expected Discharge Time:      Lizbeth Underwood MD  10/03/24

## 2024-10-03 NOTE — PLAN OF CARE
Goal Outcome Evaluation:         VSS on RA. Sinus tach on monitor. no further episodes of nausea or loose stool reported. Continues to feel generalized weakness, however pt reports improvment. possible DC home tomorrow or day after. Cont. NS at 125ml/hr dc'd. Pt endorses no further questions or concerns.             Problem: Adult Inpatient Plan of Care  Goal: Plan of Care Review  10/3/2024 1853 by Alysa Penny RN  Outcome: Progressing  Flowsheets (Taken 10/3/2024 1845 by Paul Paige RNA)  Outcome Evaluation: VSS on RA. Sinus tach on monitor. no further episodes of nausea or loose stool reported. Continues to feel generalized weakness, however pt reports improvment. possible DC home tomorrow or day after. Cont. NS at 125ml/hr dc'd. Pt endorses no further questions or concerns.  10/3/2024 1852 by Alysa Penny, RN  Outcome: Progressing  Flowsheets (Taken 10/3/2024 1845 by Paul Paige RNA)  Outcome Evaluation: VSS on RA. Sinus tach on monitor. no further episodes of nausea or loose stool reported. Continues to feel generalized weakness, however pt reports improvment. possible DC home tomorrow or day after. Cont. NS at 125ml/hr dc'd. Pt endorses no further questions or concerns.  10/3/2024 1851 by Alysa Penny, RN  Outcome: Progressing  10/3/2024 1850 by Alysa Penny, RN  Outcome: Progressing

## 2024-10-03 NOTE — THERAPY EVALUATION
Patient Name: Carolyn Snowden  : 1950    MRN: [id number]                              Today's Date: 10/3/2024       Admit Date: 10/2/2024    Visit Dx:     ICD-10-CM ICD-9-CM   1. Generalized weakness  R53.1 780.79   2. Chest pain, unspecified type  R07.9 786.50   3. Shortness of breath  R06.02 786.05   4. Diarrhea, unspecified type  R19.7 787.91     Patient Active Problem List   Diagnosis    Atopic rhinitis    Diverticulitis of intestine    Gastroesophageal reflux disease    Essential hypertension    REM sleep behavior disorder    Anxiety and depression    Pure hypercholesterolemia    Arthritis    REM sleep behavior disorder    Esophageal reflux    Essential hypertension    Intrinsic asthma without status asthmaticus without complication    Mild cognitive impairment with memory loss    Mild late onset Alzheimer's dementia without behavioral disturbance, psychotic disturbance, mood disturbance, or anxiety    NSTEMI (non-ST elevated myocardial infarction)    Diarrhea    Respiratory alkalosis     Past Medical History:   Diagnosis Date    Acute angina     Anxiety     Hyperlipidemia     Hypertension     NSTEMI (non-ST elevated myocardial infarction)      Past Surgical History:   Procedure Laterality Date    CYST REMOVAL      TUBAL ABDOMINAL LIGATION        General Information       Row Name 10/03/24 3353          OT Time and Intention    Document Type evaluation  -KF     Mode of Treatment occupational therapy;individual therapy  -KF       Row Name 10/03/24 6961          General Information    Patient Profile Reviewed yes  -KF     Prior Level of Function independent:;all household mobility;community mobility;bed mobility;ADL's;dependent:;driving  Independent prior, no AD  -KF     Existing Precautions/Restrictions fall  -KF     Barriers to Rehab medically complex;previous functional deficit  -KF       Row Name 10/03/24 8141          Occupational Profile    Environmental Supports and Barriers (Occupational  Profile) walk-in shower with seat available  -       Row Name 10/03/24 1339          Living Environment    People in Home child(mendy), adult  -       Row Name 10/03/24 1339          Home Main Entrance    Number of Stairs, Main Entrance none  -       Row Name 10/03/24 1339          Stairs Within Home, Primary    Number of Stairs, Within Home, Primary none  -       Row Name 10/03/24 1339          Cognition    Orientation Status (Cognition) oriented x 3  -       Row Name 10/03/24 1339          Safety Issues, Functional Mobility    Safety Issues Affecting Function (Mobility) awareness of need for assistance;insight into deficits/self-awareness;positioning of assistive device;safety precaution awareness;safety precautions follow-through/compliance;sequencing abilities  -     Impairments Affecting Function (Mobility) balance;endurance/activity tolerance;postural/trunk control;strength  -               User Key  (r) = Recorded By, (t) = Taken By, (c) = Cosigned By      Initials Name Provider Type    KF Marzena Toledo OT Occupational Therapist                     Mobility/ADL's       Row Name 10/03/24 1340          Bed Mobility    Bed Mobility supine-sit;sit-supine  -KF     Supine-Sit [place] (Bed Mobility) minimum assist (75% patient effort);1 person assist  -     Sit-Supine [place] (Bed Mobility) minimum assist (75% patient effort);1 person assist  -     Assistive Device (Bed Mobility) bed rails;head of bed elevated  -     Comment, (Bed Mobility) Increased time and effort needed  -       Row Name 10/03/24 1340          Transfers    Transfers sit-stand transfer;stand-sit transfer;toilet transfer  -     Comment, (Transfers) Verbal cues for hand placement; pt deferred sitting up in chair this AM  -       Row Name 10/03/24 1340          Sit-Stand Transfer    Sit-Stand [place] (Transfers) contact guard;verbal cues  -     Assistive Device (Sit-Stand Transfers) walker, front-wheeled   -KF     Comment, (Sit-Stand Transfer) STS x1 from the EOB, x1 from the commode  -KF       Row Name 10/03/24 1340          Stand-Sit Transfer    Stand-Sit [place] (Transfers) contact guard;verbal cues  -KF     Assistive Device (Stand-Sit Transfers) walker, front-wheeled  -KF       Row Name 10/03/24 1340          Toilet Transfer    [place] Level (Toilet Transfer) contact guard;verbal cues  -KF     Assistive Device (Toilet Transfer) walker, front-wheeled;commode;grab bars/safety frame  -       Row Name 10/03/24 1340          Functional Mobility    Functional Mobility- Ind. Level contact guard assist;verbal cues required  -     Functional Mobility- Device walker, front-wheeled  -     Functional Mobility-Distance (Feet) --  to/from the bathroom +30'  -     Functional Mobility- Comment Verbal cues provided to keep RWx close to base of support  -     Patient was able to Ambulate yes  -       Row Name 10/03/24 1340          Activities of Daily Living    BADL Assessment/Intervention grooming;toileting  -       Row Name 10/03/24 1340          Grooming Assessment/Training    [place] Level (Grooming) wash face, hands;set up;contact guard assist  -     Position (Grooming) sink side;supported standing  -       Row Name 10/03/24 1340          Toileting Assessment/Training    [place] Level (Toileting) adjust/manage clothing;perform perineal hygiene;contact guard assist  -     Assistive Devices (Toileting) commode;grab bar/safety frame  -     Position (Toileting) unsupported sitting;supported standing  -               User Key  (r) = Recorded By, (t) = Taken By, (c) = Cosigned By      Initials Name Provider Type     Marzena Toledo OT Occupational Therapist                   Obj/Interventions       Row Name 10/03/24 1341          Sensory Assessment (Somatosensory)    Sensory Assessment (Somatosensory) UE sensation intact  -       Row Name 10/03/24 1341          Range of Motion  Comprehensive    General Range of Motion bilateral upper extremity ROM WFL  -KF       Row Name 10/03/24 1341          Strength Comprehensive (MMT)    General Manual Muscle Testing (MMT) Assessment upper extremity strength deficits identified  -KF     Comment, General Manual Muscle Testing (MMT) Assessment BUE grossly 3+/5  -KF       Row Name 10/03/24 1341          Motor Skills    Motor Skills functional endurance  -KF     Functional Endurance fair, below baseline  -KF       Row Name 10/03/24 1341          Balance    Balance Assessment sitting static balance;sitting dynamic balance;sit to stand dynamic balance;standing static balance;standing dynamic balance  -KF     Static Sitting Balance standby assist  -KF     Dynamic Sitting Balance contact guard  -KF     Position, Sitting Balance unsupported;sitting edge of bed;other (see comments)  commode  -KF     Sit to Stand Dynamic Balance contact guard;verbal cues  -KF     Static Standing Balance contact guard  -KF     Dynamic Standing Balance contact guard  -KF     Position/Device Used, Standing Balance supported;walker, front-wheeled  -KF     Balance Interventions sitting;standing;sit to stand;supported;static;dynamic;occupation based/functional task  -KF     Comment, Balance No overt LOB, though geneally unsteady with effortful pace  -KF               User Key  (r) = Recorded By, (t) = Taken By, (c) = Cosigned By      Initials Name Provider Type    KF Marzena Toledo OT Occupational Therapist                   Goals/Plan       Row Name 10/03/24 1349          Transfer Goal 1 (OT)    Activity/Assistive Device (Transfer Goal 1, OT) commode;bed-to-chair/chair-to-bed  -KF     [place] Level/Cues Needed (Transfer Goal 1, OT) supervision required  -KF     Time Frame (Transfer Goal 1, OT) long term goal (LTG);10 days  -KF     Progress/Outcome (Transfer Goal 1, OT) goal ongoing  -KF       Row Name 10/03/24 1341          Dressing Goal 1 (OT)    Activity/Device (Dressing Goal  1, OT) upper body dressing;lower body dressing  -KF     [place]/Cues Needed (Dressing Goal 1, OT) standby assist  -KF     Time Frame (Dressing Goal 1, OT) short term goal (STG);5 days  -KF     Progress/Outcome (Dressing Goal 1, OT) goal ongoing  -KF       Row Name 10/03/24 1344          Grooming Goal 1 (OT)    Activity/Device (Grooming Goal 1, OT) hair care;oral care;wash face, hands  -KF     [place] (Grooming Goal 1, OT) supervision required  -KF     Time Frame (Grooming Goal 1, OT) long term goal (LTG);10 days  -KF     Strategies/Barriers (Grooming Goal 1, OT) standing sink side  -KF     Progress/Outcome (Grooming Goal 1, OT) goal ongoing  -KF       Row Name 10/03/24 1344          Therapy Assessment/Plan (OT)    Planned Therapy Interventions (OT) activity tolerance training;adaptive equipment training;BADL retraining;functional balance retraining;occupation/activity based interventions;patient/caregiver education/training;ROM/therapeutic exercise;strengthening exercise;transfer/mobility retraining  -KF               User Key  (r) = Recorded By, (t) = Taken By, (c) = Cosigned By      Initials Name Provider Type    KF Marzena Toledo, OT Occupational Therapist                   Clinical Impression       Row Name 10/03/24 1342          Pain Assessment    Pretreatment Pain Rating 0/10 - no pain  -KF     Posttreatment Pain Rating 0/10 - no pain  -KF       Row Name 10/03/24 1342          Plan of Care Review    Plan of Care Reviewed With patient  -KF     Progress no change  -KF     Outcome Evaluation OT evaluation completed. The pt presents below her functional baseline with generalized weakness, decreased activity tolerance, and balance deficits warranting continued IP OT services. The pt ambulated to/from the bathroom + an additional 30' using a RWx with CGA. Additional mobility limited by fatigue and generally feeling unwell. Recommend a d/c to IRF for best outcome, however will monitor closely and adjust  recommendation as indicated.  -KF       Row Name 10/03/24 1342          Therapy Assessment/Plan (OT)    Patient/Family Therapy Goal Statement (OT) Restore PLOF  -KF     Rehab Potential (OT) good, to achieve stated therapy goals  -KF     Criteria for Skilled Therapeutic Interventions Met (OT) yes;skilled treatment is necessary  -KF     Therapy Frequency (OT) daily  -KF     Predicted Duration of Therapy Intervention (OT) 5 days  -KF       Row Name 10/03/24 1342          Therapy Plan Review/Discharge Plan (OT)    Anticipated Discharge Disposition (OT) inpatient rehabilitation facility  -KF       Row Name 10/03/24 1342          Vital Signs    Pre Systolic BP Rehab 129  -KF     Pre Treatment Diastolic BP 65  -KF     Pretreatment Heart Rate (beats/min) 91  -KF     Pre SpO2 (%) 100  -KF     O2 Delivery Pre Treatment room air  -KF     Pre Patient Position Supine  -KF     Intra Patient Position Standing  -KF     Post Patient Position Supine  -KF       Row Name 10/03/24 1342          Positioning and Restraints    Pre-Treatment Position in bed  -KF     Post Treatment Position bed  -KF     In Bed notified nsg;supine;call light within reach;encouraged to call for assist;exit alarm on;with nsg  -KF               User Key  (r) = Recorded By, (t) = Taken By, (c) = Cosigned By      Initials Name Provider Type    KF Marzena Toledo, ZULEIKA Occupational Therapist                   Outcome Measures       Row Name 10/03/24 1344          How much help from another is currently needed...    Putting on and taking off regular lower body clothing? 2  -KF     Bathing (including washing, rinsing, and drying) 2  -KF     Toileting (which includes using toilet bed pan or urinal) 3  -KF     Putting on and taking off regular upper body clothing 3  -KF     Taking care of personal grooming (such as brushing teeth) 3  -KF     Eating meals 3  -KF     AM-PAC 6 Clicks Score (OT) 16  -KF       Row Name 10/03/24 0851 10/03/24 0817       How much help from  another person do you currently need...    Turning from your back to your side while in flat bed without using bedrails? 3  -OB (r)  CG (c) 3  -OB (r)  CG (c)    Moving from lying on back to sitting on the side of a flat bed without bedrails? 3  -OB (r)  CG (c) 3  -OB (r)  CG (c)    Moving to and from a bed to a chair (including a wheelchair)? 3  -OB (r)  CG (c) 3  -OB (r)  CG (c)    Standing up from a chair using your arms (e.g., wheelchair, bedside chair)? 3  -OB (r)  CG (c) 3  -OB (r)  CG (c)    Climbing 3-5 steps with a railing? 3  -OB (r)  CG (c) 3  -OB (r)  CG (c)    To walk in hospital room? 3  -OB (r)  CG (c) 3  -OB (r)  CG (c)    AM-PAC 6 Clicks Score (PT) 18  -OB 18  -OB    Highest Level of Mobility Goal 6 --> Walk 10 steps or more  -OB 6 --> Walk 10 steps or more  -OB      Row Name 10/03/24 1344          Functional Assessment    Outcome Measure Options AM-PAC 6 Clicks Daily Activity (OT)  -SELINA               User Key  (r) = Recorded By, (t) = Taken By, (c) = Cosigned By      Initials Name Provider Type    CG Alysa Penny, RN Registered Nurse    Paul Arambula RNA Registered Nurse    Marzena Augustine OT Occupational Therapist                    Occupational Therapy Education       Title: PT OT SLP Therapies (In Progress)       Topic: Occupational Therapy (In Progress)       Point: ADL training (In Progress)       Description:   Instruct learner(s) on proper safety adaptation and remediation techniques during self care or transfers.   Instruct in proper use of assistive devices.                  Learning Progress Summary             Patient Acceptance, E,TB, NR by SELINA at 10/3/2024 0838                         Point: Home exercise program (Not Started)       Description:   Instruct learner(s) on appropriate technique for monitoring, assisting and/or progressing therapeutic exercises/activities.                  Learner Progress:  Not documented in this visit.              Point: Precautions (In  Progress)       Description:   Instruct learner(s) on prescribed precautions during self-care and functional transfers.                  Learning Progress Summary             Patient Acceptance, E,TB, NR by  at 10/3/2024 0838                         Point: Body mechanics (In Progress)       Description:   Instruct learner(s) on proper positioning and spine alignment during self-care, functional mobility activities and/or exercises.                  Learning Progress Summary             Patient Acceptance, E,TB, NR by  at 10/3/2024 0838                                         User Key       Initials Effective Dates Name Provider Type Discipline     08/09/23 -  Marzena Toledo OT Occupational Therapist OT                  OT Recommendation and Plan  Planned Therapy Interventions (OT): activity tolerance training, adaptive equipment training, BADL retraining, functional balance retraining, occupation/activity based interventions, patient/caregiver education/training, ROM/therapeutic exercise, strengthening exercise, transfer/mobility retraining  Therapy Frequency (OT): daily  Plan of Care Review  Plan of Care Reviewed With: patient  Progress: no change  Outcome Evaluation: OT evaluation completed. The pt presents below her functional baseline with generalized weakness, decreased activity tolerance, and balance deficits warranting continued IP OT services. The pt ambulated to/from the bathroom + an additional 30' using a RWx with CGA. Additional mobility limited by fatigue and generally feeling unwell. Recommend a d/c to IRF for best outcome, however will monitor closely and adjust recommendation as indicated.     Time Calculation:   Evaluation Complexity (OT)  Review Occupational Profile/Medical/Therapy History Complexity: expanded/moderate complexity  Assessment, Occupational Performance/Identification of Deficit Complexity: 3-5 performance deficits  Clinical Decision Making Complexity (OT): detailed  assessment/moderate complexity  Overall Complexity of Evaluation (OT): moderate complexity     Time Calculation- OT       Row Name 10/03/24 1345             Time Calculation- OT    OT Start Time 0838  -KF      OT Received On 10/03/24  -KF      OT Goal Re-Cert Due Date 10/13/24  -KF         Timed Charges    26958 - OT Therapeutic Activity Minutes 7  -KF      61876 - OT Self Care/Mgmt Minutes 8  -KF         Untimed Charges    OT Eval/Re-eval Minutes 35  -KF         Total Minutes    Timed Charges Total Minutes 15  -KF      Untimed Charges Total Minutes 35  -KF       Total Minutes 50  -KF                User Key  (r) = Recorded By, (t) = Taken By, (c) = Cosigned By      Initials Name Provider Type    KF Marzena Toledo OT Occupational Therapist                  Therapy Charges for Today       Code Description Service Date Service Provider Modifiers Qty    02303561391 HC OT EVAL MOD COMPLEXITY 3 10/3/2024 Marzena Toledo OT GO 1    58090685293 HC OT SELF CARE/MGMT/TRAIN EA 15 MIN 10/3/2024 Marzena Toledo OT GO 1                 Marzena Toledo OT  10/3/2024

## 2024-10-03 NOTE — CONSULTS
Diabetes Education    Patient Name:  Carolyn Snowden  YOB: 1950  MRN: [id number]  Admit Date:  10/2/2024      Chart review for diabetes educator consult.     At the time of this review patient A1c from 2021 is 5.5% no new A1C is ordered for this admission, they have no noted history of diabetes and no home medications noted for treatment of diabetes. At this time we do not feel the patient would benefit from diabetes education. Thank you for this consult, should patient needs change please re consult us.    Electronically signed by:  Maria Esther Batista RN  10/03/24 09:39 EDT

## 2024-10-03 NOTE — ED NOTES
" Carolyn Snowden    Nursing Report ED to Floor:  Mental status: A&OX4  Ambulatory status: NON AMBULATORY IN ED  Oxygen Therapy:  RA  Cardiac Rhythm: SINUS TACH  Admitted from: [facility]  Safety Concerns:  NA  Social Issues: NA  ED Room #:  14    ED Nurse Phone Extension - [phone] or may call [phone].      HPI:   Chief Complaint   Patient presents with    Weakness - Generalized       Past Medical History:  Past Medical History:   Diagnosis Date    Acute angina     Anxiety     Hyperlipidemia     Hypertension     NSTEMI (non-ST elevated myocardial infarction)         Past Surgical History:  Past Surgical History:   Procedure Laterality Date    CYST REMOVAL      TUBAL ABDOMINAL LIGATION          Admitting Doctor:   Kalen Richter III, DO    Consulting Provider(s):  Consults       Date and Time Order Name Status Description    9/7/2024  5:20 PM Inpatient Neurology Consult Stroke Completed              Admitting Diagnosis:   The primary encounter diagnosis was Generalized weakness. Diagnoses of Chest pain, unspecified type, Shortness of breath, and Diarrhea, unspecified type were also pertinent to this visit.    Most Recent Vitals:   Vitals:    10/02/24 2207   BP: 173/74   BP Location: Right arm   Patient Position: Lying   Pulse: 84   Resp: 16   Temp: 97.7 °F (36.5 °C)   TempSrc: Oral   SpO2: 100%   Weight: 56.7 kg (125 lb)   Height: 154.9 cm (61\")       Active LDAs/IV Access:   Lines, Drains & Airways       Active LDAs       Name Placement date Placement time Site Days    Peripheral IV Anterior;Proximal;Right Forearm --  --  Forearm  --                    Labs (abnormal labs have a star):   Labs Reviewed   COMPREHENSIVE METABOLIC PANEL - Abnormal; Notable for the following components:       Result Value    Creatinine 1.05 (*)     eGFR 55.2 (*)     All other components within normal limits    Narrative:     GFR Normal >60  Chronic Kidney Disease <60  Kidney Failure <15    The GFR formula is only " valid for adults with stable renal function between ages 18 and 70.   TROPONIN - Abnormal; Notable for the following components:    HS Troponin T 15 (*)     All other components within normal limits    Narrative:     High Sensitive Troponin T Reference Range:  <14.0 ng/L- Negative Female for AMI  <22.0 ng/L- Negative Male for AMI  >=14 - Abnormal Female indicating possible myocardial injury.  >=22 - Abnormal Male indicating possible myocardial injury.   Clinicians would have to utilize clinical acumen, EKG, Troponin, and serial changes to determine if it is an Acute Myocardial Infarction or myocardial injury due to an underlying chronic condition.        CBC WITH AUTO DIFFERENTIAL - Abnormal; Notable for the following components:    WBC 11.24 (*)     Neutrophil % 88.0 (*)     Lymphocyte % 6.6 (*)     Monocyte % 4.4 (*)     Neutrophils, Absolute 9.91 (*)     All other components within normal limits   BLOOD GAS, ARTERIAL W/CO-OXIMETRY - Abnormal; Notable for the following components:    pH, Arterial 7.610 (*)     pCO2, Arterial 17.7 (*)     HCO3, Arterial 17.8 (*)     Base Excess, Arterial -1.3 (*)     Hemoglobin, Blood Gas 12.7 (*)     CO2 Content 18.3 (*)     pCO2, Temperature Corrected 17.7 (*)     All other components within normal limits   URINALYSIS W/ CULTURE IF INDICATED - Abnormal; Notable for the following components:    Ketones, UA 15 mg/dL (1+) (*)     Leuk Esterase, UA Moderate (2+) (*)     All other components within normal limits    Narrative:     In absence of clinical symptoms, the presence of pyuria, bacteria, and/or nitrites on the urinalysis result does not correlate with infection.   URINALYSIS, MICROSCOPIC ONLY - Abnormal; Notable for the following components:    WBC, UA 6-10 (*)     Squamous Epithelial Cells, UA 3-6 (*)     All other components within normal limits   COVID-19 AND FLU A/B, NP SWAB IN TRANSPORT MEDIA 1 HR TAT - Normal    Narrative:     Fact sheet for providers:  "https://www.fda.gov/media/053538/download    Fact sheet for patients: https://www.fda.gov/media/438306/download    Test performed by PCR.   LACTIC ACID, PLASMA - Normal   HIGH SENSITIVITIY TROPONIN T 2HR - Normal    Narrative:     High Sensitive Troponin T Reference Range:  <14.0 ng/L- Negative Female for AMI  <22.0 ng/L- Negative Male for AMI  >=14 - Abnormal Female indicating possible myocardial injury.  >=22 - Abnormal Male indicating possible myocardial injury.   Clinicians would have to utilize clinical acumen, EKG, Troponin, and serial changes to determine if it is an Acute Myocardial Infarction or myocardial injury due to an underlying chronic condition.        PROCALCITONIN - Normal    Narrative:     As a Marker for Sepsis (Non-Neonates):    1. <0.5 ng/mL represents a low risk of severe sepsis and/or septic shock.  2. >2 ng/mL represents a high risk of severe sepsis and/or septic shock.    As a Marker for Lower Respiratory Tract Infections that require antibiotic therapy:    PCT on Admission    Antibiotic Therapy       6-12 Hrs later    >0.5                Strongly Recommended  >0.25 - <0.5        Recommended   0.1 - 0.25          Discouraged              Remeasure/reassess PCT  <0.1                Strongly Discouraged     Remeasure/reassess PCT    As 28 day mortality risk marker: \"Change in Procalcitonin Result\" (>80% or <=80%) if Day 0 (or Day 1) and Day 4 values are available. Refer to http://www.[facility]pct[facility]cal[facility].com    Change in PCT <=80%  A decrease of PCT levels below or equal to 80% defines a positive change in PCT test result representing a higher risk for 28-day all-cause mortality of patients diagnosed with severe sepsis for septic shock.    Change in PCT >80%  A decrease of PCT levels of more than 80% defines a negative change in PCT result representing a lower risk for 28-day all-cause mortality of patients diagnosed with severe sepsis or septic shock.      POCT OCCULT BLOOD STOOL - Normal "   COVID PRE-OP / PRE-PROCEDURE SCREENING ORDER (NO ISOLATION)    Narrative:     The following orders were created for panel order COVID PRE-OP / PRE-PROCEDURE SCREENING ORDER (NO ISOLATION) - Swab, Nasopharynx.  Procedure                               Abnormality         Status                     ---------                               -----------         ------                     COVID-19 and FLU A/B PCR...[[id number]]  Normal              Final result                 Please view results for these tests on the individual orders.   GASTROINTESTINAL PANEL, PCR (PREFERRED) DOES NOT INCLUDE CDIFF   BLOOD CULTURE   BLOOD CULTURE   URINE CULTURE   BLOOD GAS, ARTERIAL   CBC AND DIFFERENTIAL    Narrative:     The following orders were created for panel order CBC & Differential.  Procedure                               Abnormality         Status                     ---------                               -----------         ------                     CBC Auto Differential[[id number]]        Abnormal            Final result                 Please view results for these tests on the individual orders.       Meds Given in ED:   Medications   sodium chloride 0.9 % bolus 1,000 mL (0 mL Intravenous Stopped 10/3/24 0054)   ondansetron (ZOFRAN) injection 4 mg (4 mg Intravenous Given 10/2/24 2300)   ketorolac (TORADOL) injection 10 mg (10 mg Intravenous Given 10/2/24 2323)   sodium chloride 0.9 % bolus 1,000 mL (1,000 mL Intravenous New Bag 10/3/24 0137)   iopamidol (ISOVUE-370) 76 % injection 90 mL (90 mL Intravenous Given 10/3/24 0119)     No current facility-administered medications for this encounter.       Last NIH score:                                                          Dysphagia screening results:        Mayela Coma Scale:  No data recorded     CIWA:        Restraint Type:            Isolation Status:  No active isolations      No

## 2024-10-03 NOTE — H&P
[facility] Medicine Services  HISTORY AND PHYSICAL    Patient Name: Carolyn Snowden  : 1950  MRN: [id number]  Primary Care Physician: Ezekiel Vigil APRN  Date of admission: 10/2/2024    Subjective   Subjective     Chief Complaint:  Diarrhea, chills, SOB    HPI:  Carolyn Snowden is a 74 y.o. female with a history of HTN, HLD, and anxiety/depression who presents to [facility] ED for complaint of diarrhea, chills, and shortness of breath. She states that this began acutely this evening. She reports multiple episodes of watery stool, as well as episodes of vomiting as well. Patient's daughter at bedside lives with her, and reports that this evening patient acutely began having difficulty walking and seemed to have trouble catching her breath. She was then brought here for evaluation. She denies fever, chest pain or abdominal pain.         Review of Systems   Constitutional:  Positive for chills and fatigue. Negative for fever and unexpected weight change.   HENT:  Negative for nosebleeds, sore throat and trouble swallowing.    Eyes:  Negative for photophobia and visual disturbance.   Respiratory:  Positive for shortness of breath. Negative for cough and wheezing.    Cardiovascular:  Negative for chest pain and palpitations.   Gastrointestinal:  Positive for diarrhea, nausea and vomiting. Negative for abdominal pain.   Genitourinary:  Negative for dysuria and hematuria.   Musculoskeletal:  Negative for arthralgias and myalgias.   Skin: Negative.    Neurological:  Positive for weakness (Generalized). Negative for tremors, syncope and speech difficulty.   Psychiatric/Behavioral:  Negative for confusion. The patient is not nervous/anxious.               Personal History     Past Medical History:   Diagnosis Date   • Acute angina    • Anxiety    • Hyperlipidemia    • Hypertension    • NSTEMI (non-ST elevated myocardial infarction)              Past Surgical History:    Procedure Laterality Date   • CYST REMOVAL     • TUBAL ABDOMINAL LIGATION         Family History:  family history includes Breast cancer (age of onset: 52) in her daughter; Dementia in her maternal grandmother; Heart attack in an other family member; Heart disease in an other family member; Hypertension in an other family member; Liver disease in her father and another family member; Stroke in an other family member.     Social History:  reports that she has quit smoking. Her smoking use included cigarettes. She has never used smokeless tobacco. She reports current alcohol use. She reports that she does not use drugs.  Social History     Social History Narrative   • Not on file       Medications:  NIFEdipine XL, atorvastatin, clonazePAM, hydroCHLOROthiazide, meclizine, memantine, triamcinolone, and trimethoprim-polymyxin b    Allergies   Allergen Reactions   • Ace Inhibitors Swelling   • Donepezil Other (See Comments)     nightmares       Objective   Objective     Vital Signs:   Temp:  [97.7 °F (36.5 °C)] 97.7 °F (36.5 °C)  Heart Rate:  [84] 84  Resp:  [16] 16  BP: (173)/(74) 173/74    Physical Exam  Constitutional:       General: She is not in acute distress.     Appearance: Normal appearance.   HENT:      Head: Atraumatic.      Right Ear: External ear normal.      Left Ear: External ear normal.      Nose: Nose normal.   Eyes:      Extraocular Movements: Extraocular movements intact.      Conjunctiva/sclera: Conjunctivae normal.      Pupils: Pupils are equal, round, and reactive to light.   Cardiovascular:      Rate and Rhythm: Normal rate and regular rhythm.      Pulses: Normal pulses.      Heart sounds: Normal heart sounds. No murmur heard.  Pulmonary:      Effort: Pulmonary effort is normal. No respiratory distress.      Breath sounds: Normal breath sounds. No wheezing, rhonchi or rales.   Abdominal:      General: Bowel sounds are normal. There is no distension.      Tenderness: There is no abdominal  tenderness. There is no guarding or rebound.   Musculoskeletal:         General: Normal range of motion.      Cervical back: No rigidity.      Right lower leg: No edema.      Left lower leg: No edema.   Skin:     General: Skin is warm and dry.      Coloration: Skin is not jaundiced.      Findings: No lesion or rash.   Neurological:      General: No focal deficit present.      Mental Status: She is alert and oriented to person, place, and time.   Psychiatric:         Attention and Perception: Attention normal.         Mood and Affect: Mood normal.         Behavior: Behavior normal.         Thought Content: Thought content normal.          Result Review:  I have personally reviewed the results from the time of this admission to 10/3/2024 04:17 EDT and agree with these findings:  [x]  Laboratory list / accordion  [x]  Microbiology  [x]  Radiology  [x]  EKG/Telemetry   []  Cardiology/Vascular   []  Pathology  [x]  Old records  []  Other:      LAB RESULTS:      Lab 10/03/24  0100 10/02/24  2231   WBC  --  11.24*   HEMOGLOBIN  --  13.6   HEMATOCRIT  --  42.4   PLATELETS  --  255   NEUTROS ABS  --  9.91*   IMMATURE GRANS (ABS)  --  0.03   LYMPHS ABS  --  0.74   MONOS ABS  --  0.49   EOS ABS  --  0.04   MCV  --  84.5   PROCALCITONIN 0.10  --    LACTATE  --  1.5         Lab 10/02/24  2231   SODIUM 143   POTASSIUM 3.7   CHLORIDE 104   CO2 25.0   ANION GAP 14.0   BUN 16   CREATININE 1.05*   EGFR 55.2*   GLUCOSE 99   CALCIUM 9.9         Lab 10/02/24  2231   TOTAL PROTEIN 8.1   ALBUMIN 4.8   GLOBULIN 3.3   ALT (SGPT) 13   AST (SGOT) 19   BILIRUBIN 0.5   ALK PHOS 63         Lab 10/03/24  0100 10/02/24  2231   HSTROP T 13 15*                 Lab 10/02/24  2343   PH, ARTERIAL 7.610*   PCO2, ARTERIAL 17.7*   PO2 .0   FIO2 21   HCO3 ART 17.8*   BASE EXCESS ART -1.3*   CARBOXYHEMOGLOBIN 0.9     Brief Urine Lab Results  (Last result in the past 365 days)        Color   Clarity   Blood   Leuk Est   Nitrite   Protein   CREAT    Urine HCG        10/03/24 0137 Yellow   Clear   Negative   Moderate (2+)   Negative   Negative                 Microbiology Results (last 10 days)       Procedure Component Value - Date/Time    COVID PRE-OP / PRE-PROCEDURE SCREENING ORDER (NO ISOLATION) - Swab, Nasopharynx [[id number]]  (Normal) Collected: 10/02/24 2227    Lab Status: Final result Specimen: Swab from Nasopharynx Updated: 10/02/24 2300    Narrative:      The following orders were created for panel order COVID PRE-OP / PRE-PROCEDURE SCREENING ORDER (NO ISOLATION) - Swab, Nasopharynx.  Procedure                               Abnormality         Status                     ---------                               -----------         ------                     COVID-19 and FLU A/B PCR...[[id number]]  Normal              Final result                 Please view results for these tests on the individual orders.    COVID-19 and FLU A/B PCR, 1 HR TAT - Swab, Nasopharynx [[id number]]  (Normal) Collected: 10/02/24 2227    Lab Status: Final result Specimen: Swab from Nasopharynx Updated: 10/02/24 2300     COVID19 Not Detected     Influenza A PCR Not Detected     Influenza B PCR Not Detected    Narrative:      Fact sheet for providers: https://www.fda.gov/media/163404/download    Fact sheet for patients: https://www.fda.gov/media/170601/download    Test performed by PCR.            CT Angiogram Chest    Result Date: 10/3/2024  CT ANGIOGRAM CHEST Date of Exam: 10/3/2024 1:09 AM EDT Indication: PE protocol. Comparison: Chest radiograph 10/2/2024. Technique: CTA of the chest was performed after the uneventful intravenous administration of 90 mL Isovue-370. Reconstructed coronal and sagittal images were also obtained. In addition, a 3-D volume rendered image was created for interpretation. Automated exposure control and iterative reconstruction methods were used. Findings: Thyroid contains a nodule at the inferior left lobe measuring 13 mm. This could be further  evaluated with ultrasound follow-up. The heart is mildly enlarged. There is a small circumferential pericardial effusion. The aorta and aortic branch vessels appear normal. Main pulmonary artery is normal diameter. There is no evidence of pulmonary embolism. No mediastinal lymphadenopathy. The trachea and the esophagus appear within normal limits. There is dependent bibasilar atelectasis. No pneumothorax, pleural effusion or focal pneumonia. Airways are patent. No significant pleural disease. There are no acute findings in the superficial soft tissues. Findings in the abdomen discussed in a separate report. There is no acute osseous abnormality or destructive bone lesion. No significant thoracic degenerative changes.     Impression: Impression: 1.No evidence of pulmonary embolism. 2.Mild cardiomegaly and small pericardial effusion. 3.Dependent bibasilar atelectasis. Electronically Signed: Jules Manuel MD  10/3/2024 2:12 AM EDT  Workstation ID: [id number]    CT Abdomen Pelvis With Contrast    Result Date: 10/3/2024  CT ABDOMEN PELVIS W CONTRAST Date of Exam: 10/3/2024 1:09 AM EDT Indication: Diarrhea, sepsis, unknown source. Comparison: None available. Technique: Axial CT images were obtained of the abdomen and pelvis following the uneventful intravenous administration of 90 mL of Isovue-370. Reconstructed coronal and sagittal images were also obtained. Automated exposure control and iterative construction methods were used. Findings: Findings in the chest discussed in a separate report. Exam limited by the patient's arms resulting in streak and beam hardening artifact. No acute findings in the superficial soft tissues. No acute osseous abnormality or destructive bone lesion. There is  3 mm anterolisthesis of L4 on L5 and there is mild lumbar disc and moderate facet joint degeneration. The liver, gallbladder, bile ducts, pancreas and pancreatic duct, spleen and adrenal glands on the left appear within normal limits.  There is a low-density right adrenal nodule measuring 19 mm consistent with an adenoma. The kidneys are normal. No hydronephrosis. The urinary bladder, uterus and adnexal areas appear unremarkable. The appendix is normal. There is liquid stool in the proximal colon suggesting a diarrheal state. There is mild distal colonic diverticulosis. Rectal wall thickening could  relate to proctitis. No small bowel distention. The abdominal and pelvic vasculature appear within normal limits. No ascites, pneumoperitoneum or lymphadenopathy.     Impression: Impression: 1.Rectal wall thickening could relate to proctitis. 2.Liquid stool in the proximal colon suggesting a diarrheal state. 3.Mild colonic diverticulosis. 4.Lumbar degenerative changes. Electronically Signed: Jules Manuel MD  10/3/2024 2:10 AM EDT  Workstation ID: [id number]    XR Chest 1 View    Result Date: 10/3/2024  XR CHEST 1 VW Date of Exam: 10/2/2024 11:41 PM EDT Indication: Short of breath, chest pain Comparison: 9/7/2024. Findings: There is no pneumothorax, pleural effusion or focal airspace consolidation. Heart size and pulmonary vasculature appear within normal limits. Regional bones appear intact.     Impression: Impression: No acute cardiopulmonary abnormality. Electronically Signed: Jules Manuel MD  10/3/2024 12:44 AM EDT  Workstation ID: [id number]         Assessment & Plan   Assessment & Plan       Diarrhea    Respiratory alkalosis    Pure hypercholesterolemia    Essential hypertension    Anxiety and depression      Carolyn Snowden is a 74 y.o. female with a history of HTN, HLD, and anxiety/depression who presents to [facility] ED for complaint of diarrhea, chills, and shortness of breath. She states that this began acutely this evening.       Acute diarrhea  Generalized Weakness  Respiratory Alkalosis  -CTA chest tonight negative for PE. Does show a small pericardial effusion but otherwise no acute findings  -CT abd/pelvis tonight shows rectal wall  thickening which could relate to proctitis, as well as diarrhea.   -Stool pcr pending. Will check for C diff as well. Defer antibiotics for now.   -CT head pending  -Lactate and procal negative  -Repeat ABG in the am  -IV fluids tonight  -Zofran for nausea  -Will defer anti-diarrheal meds pending result sof c diff toxin    HTN  HLD  -Takes Nifedipine. Continue, but hold HCTZ for now while receiving IV fluids.             DVT prophylaxis:  SCDS    CODE STATUS:  DNR/DNI  Medical Intervention Limits: No intubation (DNI)  Code Status (Patient has no pulse and is not breathing): No CPR (Do Not Attempt to Resuscitate)  Medical Interventions (Patient has pulse or is breathing): Limited Support      Expected DischargeTBD       This note has been completed as part of a split-shared workflow.     Signature: Electronically signed by Jimbo Easton PA-C, 10/03/24, 4:10 AM EDT      Attending   Admission Attestation       I have performed an independent face-to-face diagnostic evaluation including performing an independent physical examination.  I approve of the documented plan of care above that was reviewed and developed with the advanced practice clinician (APC) and take responsibility for that plan along with its associated risks.  I have updated the HPI as appropriate.    Brief HPI    74F states that earlier this evening (Wednesday 10/2) she had onset of diarrhea.  She denies any recent dietary or medication changes, but states that she will have a very short period of cramping abdominal pain all throughout her abdomen, followed by watery stools, and she states this happens several times since onset earlier today.  She has also had nausea with some emesis; no dino blood in stool or emesis.  She states that this became so bad earlier that she asked her daughter to come home from work and check on her, and they both decided the patient should be taken to the ED for further evaluation.  The patient also states that  "she began to have several short periods of \"really bad chills,\" with shaking rigors.  She did not take a temperature during these episodes.  She denies any recent antibiotic use, head trauma, abdominal trauma, also denies chest pain, shortness of breath, dizziness/lightheadedness, slurred speech/facial droop, focal weakness, or syncope.    Attending Physical Exam:  Temp:  [97.7 °F (36.5 °C)] 97.7 °F (36.5 °C)  Heart Rate:  [84] 84  Resp:  [16] 16  BP: (173)/(74) 173/74    Constitutional: Awake, alert, NAD, pleasant.  Eyes: PERRLA, sclerae anicteric, no conjunctival injection  HENT: NCAT, mucous membranes dry.  Neck: Supple, no thyromegaly, no lymphadenopathy, trachea midline  Respiratory: Clear to auscultation bilaterally, nonlabored respirations   Cardiovascular: Regular rhythm but slightly tachycardic with rate 104 on my exam, no murmurs, rubs, or gallops, palpable pedal pulses bilaterally  Gastrointestinal: Positive bowel sounds, soft, nontender, nondistended  Musculoskeletal: No bilateral ankle edema, no clubbing or cyanosis to extremities  Psychiatric: Appropriate affect, cooperative  Neurologic: Oriented x 3, strength symmetric in all extremities, Cranial Nerves grossly intact to confrontation, speech clear  Skin: No rashes, poor turgor    Result Review:  I have personally reviewed the results from the time of this admission to 10/3/2024 05:58 EDT and agree with these findings:  [x]  Laboratory list / accordion  []  Microbiology  [x]  Radiology  [x]  EKG/Telemetry   []  Cardiology/Vascular   []  Pathology  []  Old records  []  Other:  Most notable findings include: I reviewed chest x-ray which is a single AP view and by my read shows nothing acute.  Reviewed radiology report from CT abdomen/pelvis, CTA chest, and CT head without contrast.  I reviewed EKG which by my read shows sinus rhythm, ventricular rate just under 100 bpm, normal axis, significant artifact on the study, some PVCs, nonspecific ST/T wave " changes.    Assessment and Plan:    See assessment and plan documented by APC above and updated/edited by me as appropriate.      Total time spent: 42 minutes  Time spent includes time reviewing chart, face-to-face time, counseling patient/family/caregiver, ordering medications/tests/procedures, communicating with other health care professionals, documenting clinical information in the electronic health record, and coordination of care.       Kalen Richter III, DO  10/03/24

## 2024-10-03 NOTE — PLAN OF CARE
Goal Outcome Evaluation:  Plan of Care Reviewed With: patient        Progress: no change  Outcome Evaluation: OT evaluation completed. The pt presents below her functional baseline with generalized weakness, decreased activity tolerance, and balance deficits warranting continued IP OT services. The pt ambulated to/from the bathroom + an additional 30' using a RWx with CGA. Additional mobility limited by fatigue and generally feeling unwell. Recommend a d/c to IRF for best outcome, however will monitor closely and adjust recommendation as indicated.      Anticipated Discharge Disposition (OT): inpatient rehabilitation facility

## 2024-10-03 NOTE — ED PROVIDER NOTES
Subjective   History of Present Illness  Mrs. Snowden presents by ambulance from home with diarrhea, weakness, chills, shortness of breath.  She reports having copious diarrhea for the last 3 days but today it worsened significantly.  She denies blood in her diarrhea but tells me it is thick and dark.  She denies abdominal pain.  She has had chills and sweats.  No recent antibiotics.      Review of Systems    Past Medical History:   Diagnosis Date    Acute angina     Anxiety     Hyperlipidemia     Hypertension     NSTEMI (non-ST elevated myocardial infarction)        Allergies   Allergen Reactions    Ace Inhibitors Swelling    Donepezil Other (See Comments)     nightmares       Past Surgical History:   Procedure Laterality Date    CYST REMOVAL      TUBAL ABDOMINAL LIGATION         Family History   Problem Relation Age of Onset    Liver disease Father     Breast cancer Daughter 52    Dementia Maternal Grandmother     Liver disease Other     Hypertension Other     Heart disease Other     Stroke Other     Heart attack Other     Ovarian cancer Neg Hx        Social History     Socioeconomic History    Marital status:    Tobacco Use    Smoking status: Former     Types: Cigarettes    Smokeless tobacco: Never   Vaping Use    Vaping status: Never Used   Substance and Sexual Activity    Alcohol use: Yes     Comment: occasional    Drug use: Never    Sexual activity: Not Currently           Objective   Physical Exam  Vitals and nursing note reviewed.   Constitutional:       General: She is not in acute distress.     Appearance: Normal appearance.   HENT:      Head: Normocephalic and atraumatic.      Nose: Nose normal. No congestion or rhinorrhea.   Eyes:      General: No scleral icterus.     Conjunctiva/sclera: Conjunctivae normal.   Neck:      Comments: No JVD   Cardiovascular:      Rate and Rhythm: Normal rate and regular rhythm.      Heart sounds: No murmur heard.     No friction rub.   Pulmonary:      Effort:  Pulmonary effort is normal.      Breath sounds: Normal breath sounds. No wheezing or rales.   Abdominal:      General: Bowel sounds are normal.      Palpations: Abdomen is soft.      Tenderness: There is no abdominal tenderness. There is no guarding or rebound.   Musculoskeletal:         General: No tenderness.      Cervical back: Normal range of motion and neck supple.      Right lower leg: No edema.      Left lower leg: No edema.   Skin:     General: Skin is warm and dry.      Coloration: Skin is not pale.      Findings: No erythema.   Neurological:      General: No focal deficit present.      Mental Status: She is alert.      Motor: No weakness.      Coordination: Coordination normal.   Psychiatric:         Mood and Affect: Mood normal.         Behavior: Behavior normal.         Thought Content: Thought content normal.         Procedures           ED Course  ED Course as of 10/03/24 0259   Wed Oct 02, 2024   2317 Very anxious, shaking, complaining that she cannot get a deep breath.  Saturations are 100% on room air. [DT]   Thu Oct 03, 2024   0056 Reviewed ABG with respiratory therapy.  Shows hyperventilation. [DT]   0057 Has completed a liter of fluids.  She is resting quietly now.  Saturations remained at 100%.  She remains tachycardic however.  Her daughter tells me she has had a couple of car trips over the last week.  She just got back from [place] and yesterday got back from [place].  She tells me the diarrhea just started today and it was fairly minimal.  Will obtain CT angiogram chest to evaluate for pulmonary embolism. [DT]   0204 Procalcitonin low, urine negative.  CT scans have been done, awaiting radiologist interpretation.  Troponin not trending upward.  Remains tachycardic with heart rate of 112.  Second liter is infusing. [DT]   0231 CT angiogram negative.  Heart rate 109.  Stools has been collected and studies are in process.  CT abdomen pelvis shows diarrhea [DT]      ED Course User  Index  [DT] Simon Enciso MD                                             Medical Decision Making  Please see course notes.  I observed him for 5 hours in the emergency department.  Gave multiple rounds of IV fluids.  Gave IV medication.  Obtained history from her daughter.  Ordered and interpreted CT scans of her chest abdomen and pelvis.  Had multiple reevaluations    Problems Addressed:  Chest pain, unspecified type: complicated acute illness or injury  Diarrhea, unspecified type: complicated acute illness or injury  Generalized weakness: complicated acute illness or injury  Shortness of breath: complicated acute illness or injury that poses a threat to life or bodily functions    Amount and/or Complexity of Data Reviewed  Labs: ordered. Decision-making details documented in ED Course.  Radiology: ordered. Decision-making details documented in ED Course.  ECG/medicine tests: ordered. Decision-making details documented in ED Course.    Risk  Prescription drug management.  Decision regarding hospitalization.        Final diagnoses:   Chest pain, unspecified type   Shortness of breath   Diarrhea, unspecified type   Generalized weakness       ED Disposition  ED Disposition       ED Disposition   Decision to Admit    Condition   --    Comment   --               No follow-up provider specified.       Medication List      No changes were made to your prescriptions during this visit.            Simon Enciso MD  10/03/24 0259

## 2024-10-04 PROBLEM — E44.0 MODERATE MALNUTRITION: Status: ACTIVE | Noted: 2024-10-04

## 2024-10-04 PROBLEM — A08.11 NOROVIRUS: Status: ACTIVE | Noted: 2024-10-04

## 2024-10-04 LAB
ANION GAP SERPL CALCULATED.3IONS-SCNC: 6 MMOL/L (ref 5–15)
BACTERIA SPEC AEROBE CULT: NO GROWTH
BUN SERPL-MCNC: 7 MG/DL (ref 8–23)
BUN/CREAT SERPL: 8.5 (ref 7–25)
CALCIUM SPEC-SCNC: 8.2 MG/DL (ref 8.6–10.5)
CHLORIDE SERPL-SCNC: 112 MMOL/L (ref 98–107)
CO2 SERPL-SCNC: 25 MMOL/L (ref 22–29)
CREAT SERPL-MCNC: 0.82 MG/DL (ref 0.57–1)
EGFRCR SERPLBLD CKD-EPI 2021: 74.2 ML/MIN/1.73
GLUCOSE SERPL-MCNC: 98 MG/DL (ref 65–99)
POTASSIUM SERPL-SCNC: 3.4 MMOL/L (ref 3.5–5.2)
SODIUM SERPL-SCNC: 143 MMOL/L (ref 136–145)

## 2024-10-04 PROCEDURE — 97161 PT EVAL LOW COMPLEX 20 MIN: CPT

## 2024-10-04 PROCEDURE — G0378 HOSPITAL OBSERVATION PER HR: HCPCS

## 2024-10-04 PROCEDURE — 80048 BASIC METABOLIC PNL TOTAL CA: CPT | Performed by: PHYSICIAN ASSISTANT

## 2024-10-04 PROCEDURE — 96376 TX/PRO/DX INJ SAME DRUG ADON: CPT

## 2024-10-04 PROCEDURE — 99232 SBSQ HOSP IP/OBS MODERATE 35: CPT | Performed by: INTERNAL MEDICINE

## 2024-10-04 PROCEDURE — 25010000002 ONDANSETRON PER 1 MG: Performed by: PHYSICIAN ASSISTANT

## 2024-10-04 RX ORDER — ATORVASTATIN CALCIUM 10 MG/1
10 TABLET, FILM COATED ORAL DAILY
Status: DISCONTINUED | OUTPATIENT
Start: 2024-10-04 | End: 2024-10-05 | Stop reason: HOSPADM

## 2024-10-04 RX ORDER — NIFEDIPINE 30 MG/1
30 TABLET, EXTENDED RELEASE ORAL
Status: DISCONTINUED | OUTPATIENT
Start: 2024-10-05 | End: 2024-10-05 | Stop reason: HOSPADM

## 2024-10-04 RX ORDER — ONDANSETRON 4 MG/1
4 TABLET, ORALLY DISINTEGRATING ORAL EVERY 6 HOURS PRN
Qty: 15 TABLET | Refills: 0 | Status: SHIPPED | OUTPATIENT
Start: 2024-10-04 | End: 2024-10-07

## 2024-10-04 RX ORDER — CLONAZEPAM 1 MG/1
1 TABLET ORAL 2 TIMES DAILY PRN
Status: DISCONTINUED | OUTPATIENT
Start: 2024-10-04 | End: 2024-10-05 | Stop reason: HOSPADM

## 2024-10-04 RX ORDER — MEMANTINE HYDROCHLORIDE 10 MG/1
10 TABLET ORAL EVERY 12 HOURS SCHEDULED
Status: DISCONTINUED | OUTPATIENT
Start: 2024-10-04 | End: 2024-10-05 | Stop reason: HOSPADM

## 2024-10-04 RX ADMIN — Medication 10 ML: at 20:02

## 2024-10-04 RX ADMIN — Medication 10 ML: at 08:48

## 2024-10-04 RX ADMIN — MEMANTINE 10 MG: 10 TABLET ORAL at 20:02

## 2024-10-04 RX ADMIN — ATORVASTATIN CALCIUM 10 MG: 10 TABLET, FILM COATED ORAL at 20:02

## 2024-10-04 RX ADMIN — ONDANSETRON 4 MG: 2 INJECTION INTRAMUSCULAR; INTRAVENOUS at 15:31

## 2024-10-04 RX ADMIN — CLONAZEPAM 1 MG: 1 TABLET ORAL at 19:59

## 2024-10-04 NOTE — CASE MANAGEMENT/SOCIAL WORK
Discharge Planning Assessment  [facility]     Patient Name: Carolyn Snowden  MRN: [id number]  Today's Date: 10/4/2024    Admit Date: 10/2/2024    Plan: Home   Discharge Needs Assessment       Row Name 10/04/24 0932       Living Environment    People in Home child(mendy), adult    Current Living Arrangements independent living facility    Primary Care Provided by self    Provides Primary Care For no one, unable/limited ability to care for self    Family Caregiver if Needed child(mendy), adult    Quality of Family Relationships unable to assess    Able to Return to Prior Arrangements yes       Resource/Environmental Concerns    Resource/Environmental Concerns none       Transition Planning    Patient/Family Anticipates Transition to home with family    Patient/Family Anticipated Services at Transition ;rehabilitation services    Transportation Anticipated family or friend will provide       Discharge Needs Assessment    Readmission Within the Last 30 Days no previous admission in last 30 days    Equipment Currently Used at Home none    Concerns to be Addressed discharge planning    Anticipated Changes Related to Illness none                   Discharge Plan       Row Name 10/04/24 0932       Plan    Plan Home    Patient/Family in Agreement with Plan yes    Plan Comments Spoke with patient in room to initiate discharge planning.  She lives with her daughter in independent living apartment at [facility] in [facility].  Prior to admission, she was independent with ADL's.  She has no DME at home and is not current with home health.  Her PCP is Ezekiel Vigil.  Verified that she has Medicare A/B.  She does not have RX coverage, but has her scripts filled at [facility].  Her goal is to return home at discharge.  Will await therapy recommendations to determine proepr discharge placement.  CM will continue to follow.    Final Discharge Disposition Code 01 - home or self-care                  Continued Care and Services  - Admitted Since 10/2/2024    No active coordination exists for this encounter.       Expected Discharge Date and Time       Expected Discharge Date Expected Discharge Time    Oct 7, 2024            Demographic Summary       Row Name 10/04/24 0930       General Information    Admission Type observation    Arrived From emergency department    Referral Source admission list    Reason for Consult discharge planning    Preferred Language English                   Functional Status       Row Name 10/04/24 0930       Functional Status    Usual Activity Tolerance fair    Current Activity Tolerance fair       Functional Status, IADL    Medications assistive person    Meal Preparation assistive person    Housekeeping assistive person    Laundry assistive person    Shopping assistive person                   Psychosocial    No documentation.                  Abuse/Neglect    No documentation.                  Legal    No documentation.                  Substance Abuse    No documentation.                  Patient Forms    No documentation.                     Nicole Quiroga RN

## 2024-10-04 NOTE — CASE MANAGEMENT/SOCIAL WORK
Continued Stay Note  [facility]     Patient Name: Carolyn Snowden  MRN: [id number]  Today's Date: 10/4/2024    Admit Date: 10/2/2024    Plan: Home w/Home Health   Discharge Plan       Row Name 10/04/24 1253       Plan    Plan Home w/Home Health    Patient/Family in Agreement with Plan yes    Plan Comments Therapy recommends home health at discharge.  Referral called to Radha with [facility] for PT, OT and skilled nursing.  No other needs noted.    Final Discharge Disposition Code 06 - home with home health care                   Discharge Codes    No documentation.                 Expected Discharge Date and Time       Expected Discharge Date Expected Discharge Time    Oct 7, 2024               Nicole Quiroga RN

## 2024-10-04 NOTE — THERAPY EVALUATION
Patient Name: Carolyn Snowden  : 1950    MRN: [id number]                              Today's Date: 10/4/2024       Admit Date: 10/2/2024    Visit Dx:     ICD-10-CM ICD-9-CM   1. Generalized weakness  R53.1 780.79   2. Chest pain, unspecified type  R07.9 786.50   3. Shortness of breath  R06.02 786.05   4. Diarrhea, unspecified type  R19.7 787.91     Patient Active Problem List   Diagnosis    Atopic rhinitis    Diverticulitis of intestine    Gastroesophageal reflux disease    Essential hypertension    REM sleep behavior disorder    Anxiety and depression    Pure hypercholesterolemia    Arthritis    REM sleep behavior disorder    Esophageal reflux    Essential hypertension    Intrinsic asthma without status asthmaticus without complication    Mild cognitive impairment with memory loss    Mild late onset Alzheimer's dementia without behavioral disturbance, psychotic disturbance, mood disturbance, or anxiety    NSTEMI (non-ST elevated myocardial infarction)    Diarrhea    Respiratory alkalosis    Moderate malnutrition     Past Medical History:   Diagnosis Date    Acute angina     Anxiety     Hyperlipidemia     Hypertension     NSTEMI (non-ST elevated myocardial infarction)      Past Surgical History:   Procedure Laterality Date    CYST REMOVAL      TUBAL ABDOMINAL LIGATION        General Information       Row Name 10/04/24 1148          Physical Therapy Time and Intention    Document Type evaluation  -CK     Mode of Treatment individual therapy;physical therapy  -CK       Row Name 10/04/24 1148          General Information    Patient Profile Reviewed yes  -CK     Prior Level of Function independent:;all household mobility;ADL's;dependent:;driving  ind no AD, patient's daughter does the driving  -CK     Existing Precautions/Restrictions fall  -CK     Barriers to Rehab medically complex;previous functional deficit  -CK       Row Name 10/04/24 1148          Living Environment    People in Home child(mendy),  adult;other (see comments)  lives with daughter in [facility]  -CK       Row Name 10/04/24 1148          Home Main Entrance    Number of Stairs, Main Entrance none  -CK       Row Name 10/04/24 1148          Stairs Within Home, Primary    Number of Stairs, Within Home, Primary none  -CK       Row Name 10/04/24 1148          Cognition    Orientation Status (Cognition) oriented x 3  -CK       Row Name 10/04/24 1148          Safety Issues, Functional Mobility    Safety Issues Affecting Function (Mobility) awareness of need for assistance;insight into deficits/self-awareness;positioning of assistive device;safety precaution awareness;safety precautions follow-through/compliance;sequencing abilities  -CK     Impairments Affecting Function (Mobility) balance;endurance/activity tolerance;postural/trunk control;strength  -CK               User Key  (r) = Recorded By, (t) = Taken By, (c) = Cosigned By      Initials Name Provider Type    CK Soledad Leslie PT Physical Therapist                   Mobility       Row Name 10/04/24 1149          Bed Mobility    Bed Mobility supine-sit  -CK     Supine-Sit [place] (Bed Mobility) standby assist  -CK     Assistive Device (Bed Mobility) bed rails;head of bed elevated  -CK     Comment, (Bed Mobility) good sequencing without cues  -CK       Row Name 10/04/24 1149          Bed-Chair Transfer    Bed-Chair [place] (Transfers) minimum assist (75% patient effort);1 person assist  -CK     Assistive Device (Bed-Chair Transfers) other (see comments)  grabbing armrest of chair  -CK     Comment, (Bed-Chair Transfer) patient able to take sidesteps from bed to chair, unsteadiness noted with patient c/o dizziness. Seated BP was 168/76  -CK       Row Name 10/04/24 1149          Sit-Stand Transfer    Sit-Stand [place] (Transfers) contact guard;verbal cues  -CK     Assistive Device (Sit-Stand Transfers) walker, front-wheeled  -CK     Comment, (Sit-Stand Transfer) x1 from bed  with no AD, x1 from chair with FWW, improved balance with FWW  -CK       Row Name 10/04/24 1149          Gait/Stairs (Locomotion)    [place] Level (Gait) standby assist  -CK     Assistive Device (Gait) walker, front-wheeled  -CK     Patient was able to Ambulate yes  -CK     Distance in Feet (Gait) 100  -CK     Deviations/Abnormal Patterns (Gait) bilateral deviations;johnathan decreased;gait speed decreased;stride length decreased  -CK     Comment, (Gait/Stairs) Patient ambulated in room with step through gait pattern. Balance much improved with use of FWW compared to the sidesteps she took to the chair with no AD. Cues for continuous progression of walker as patient tends to pick it up. Otherwise her mechanics are adequate, just with decreased pace.  -CK               User Key  (r) = Recorded By, (t) = Taken By, (c) = Cosigned By      Initials Name Provider Type    CK Soledad Leslie, PT Physical Therapist                   Obj/Interventions       Row Name 10/04/24 1152          Range of Motion Comprehensive    General Range of Motion bilateral lower extremity ROM WFL  -CK       Row Name 10/04/24 1152          Strength Comprehensive (MMT)    General Manual Muscle Testing (MMT) Assessment lower extremity strength deficits identified  -CK     Comment, General Manual Muscle Testing (MMT) Assessment RLE grossly 4/5, LLE grossly 4+/5  -CK       Row Name 10/04/24 1152          Balance    Balance Assessment sitting static balance;standing static balance;standing dynamic balance  -CK     Static Sitting Balance standby assist  -CK     Position, Sitting Balance unsupported;sitting in chair;sitting edge of bed  -CK     Static Standing Balance contact guard  -CK     Dynamic Standing Balance standby assist  -CK     Position/Device Used, Standing Balance supported;walker, front-wheeled  -CK     Comment, Balance no LOB with use of walker, she was unsteady without walker  -CK       Row Name 10/04/24 1152          Sensory  Assessment (Somatosensory)    Sensory Assessment (Somatosensory) LE sensation intact  -CK               User Key  (r) = Recorded By, (t) = Taken By, (c) = Cosigned By      Initials Name Provider Type    CK Soledad Leslie PT Physical Therapist                   Goals/Plan       Row Name 10/04/24 1154          Bed Mobility Goal 1 (PT)    Activity/Assistive Device (Bed Mobility Goal 1, PT) sit to supine/supine to sit  -CK     [place] Level/Cues Needed (Bed Mobility Goal 1, PT) independent  -CK     Time Frame (Bed Mobility Goal 1, PT) short term goal (STG);3 days  -CK     Progress/Outcomes (Bed Mobility Goal 1, PT) new goal  -CK       Row Name 10/04/24 1154          Transfer Goal 1 (PT)    Activity/Assistive Device (Transfer Goal 1, PT) sit-to-stand/stand-to-sit;bed-to-chair/chair-to-bed  -CK     [place] Level/Cues Needed (Transfer Goal 1, PT) modified independence  -CK     Time Frame (Transfer Goal 1, PT) long term goal (LTG);10 days  -CK     Progress/Outcome (Transfer Goal 1, PT) new goal  -CK       Row Name 10/04/24 8480          Gait Training Goal 1 (PT)    Activity/Assistive Device (Gait Training Goal 1, PT) gait (walking locomotion);assistive device use  -CK     [place] Level (Gait Training Goal 1, PT) modified independence  -CK     Distance (Gait Training Goal 1, PT) 250'  -CK     Time Frame (Gait Training Goal 1, PT) long term goal (LTG);10 days  -CK     Progress/Outcome (Gait Training Goal 1, PT) new goal  -CK       Row Name 10/04/24 7650          Therapy Assessment/Plan (PT)    Planned Therapy Interventions (PT) balance training;bed mobility training;gait training;home exercise program;neuromuscular re-education;transfer training;stretching;strengthening;stair training;ROM (range of motion);postural re-education;patient/family education  -CK               User Key  (r) = Recorded By, (t) = Taken By, (c) = Cosigned By      Initials Name Provider Type    Soledad Hussein, [place] Physical  Therapist                   Clinical Impression       Row Name 10/04/24 1153          Pain    Pretreatment Pain Rating 0/10 - no pain  -CK     Posttreatment Pain Rating 0/10 - no pain  -CK       Row Name 10/04/24 1153          Plan of Care Review    Plan of Care Reviewed With patient  -CK     Outcome Evaluation Patient presents with deficits in strength, endurance, and balance. She was able to ambulate 100' SBA with FWW in room with very slow pace due to complaints of weakness. IPPT is indicated to address current deficits. Recommend D/C home with assist and HHPT with use of FWW when medically appropriate.  -CK       Row Name 10/04/24 1153          Therapy Assessment/Plan (PT)    Patient/Family Therapy Goals Statement (PT) feel better  -CK     Rehab Potential (PT) good, to achieve stated therapy goals  -CK     Criteria for Skilled Interventions Met (PT) yes;meets criteria;skilled treatment is necessary  -CK     Therapy Frequency (PT) daily  -CK     Predicted Duration of Therapy Intervention (PT) 4 days  -CK       Row Name 10/04/24 1153          Vital Signs    Pre Systolic BP Rehab 145  -CK     Pre Treatment Diastolic BP 66  -CK     Intra Systolic BP Rehab 168  -CK     Intra Treatment Diastolic BP 76  -CK     Pretreatment Heart Rate (beats/min) 67  -CK     Pre SpO2 (%) 100  -CK     O2 Delivery Pre Treatment room air  -CK     O2 Delivery Intra Treatment room air  -CK     O2 Delivery Post Treatment room air  -CK     Pre Patient Position Supine  -CK     Intra Patient Position Sitting  -CK     Post Patient Position Sitting  -CK       Row Name 10/04/24 1153          Positioning and Restraints    Pre-Treatment Position in bed  -CK     Post Treatment Position chair  -CK     In Chair reclined;call light within reach;encouraged to call for assist;exit alarm on;waffle cushion;with other staff;notified nsg  -CK               User Key  (r) = Recorded By, (t) = Taken By, (c) = Cosigned By      Initials Name Provider Type    CK  Soledad Leslie, PT Physical Therapist                   Outcome Measures       Row Name 10/04/24 1156 10/04/24 0800       How much help from another person do you currently need...    Turning from your back to your side while in flat bed without using bedrails? 4  -CK 3  -EC (r) OB (t) EC (c)    Moving from lying on back to sitting on the side of a flat bed without bedrails? 3  -CK 3  -EC (r) OB (t) EC (c)    Moving to and from a bed to a chair (including a wheelchair)? 3  -CK 3  -EC (r) OB (t) EC (c)    Standing up from a chair using your arms (e.g., wheelchair, bedside chair)? 3  -CK 3  -EC (r) OB (t) EC (c)    Climbing 3-5 steps with a railing? 3  -CK 3  -EC (r) OB (t) EC (c)    To walk in hospital room? 3  -CK 3  -EC (r) OB (t) EC (c)    AM-PAC 6 Clicks Score (PT) 19  -CK 18  -OB    Highest Level of Mobility Goal 6 --> Walk 10 steps or more  -CK 6 --> Walk 10 steps or more  -OB      Row Name 10/04/24 1156          Functional Assessment    Outcome Measure Options AM-PAC 6 Clicks Basic Mobility (PT)  -CK               User Key  (r) = Recorded By, (t) = Taken By, (c) = Cosigned By      Initials Name Provider Type    OB Paul Paige RNA Registered Nurse    CK Soledad Leslie, PT Physical Therapist    EC Leeanne Gauthier RN Registered Nurse                                 Physical Therapy Education       Title: PT OT SLP Therapies (In Progress)       Topic: Physical Therapy (In Progress)       Point: Mobility training (Done)       Learning Progress Summary             Patient Acceptance, E, VU by CK at 10/4/2024 1156                         Point: Home exercise program (Not Started)       Learner Progress:  Not documented in this visit.              Point: Body mechanics (Done)       Learning Progress Summary             Patient Acceptance, E, VU by CK at 10/4/2024 1156                         Point: Precautions (Done)       Learning Progress Summary             Patient Acceptance, E, VU by CK at 10/4/2024 1156                                          User Key       Initials Effective Dates Name Provider Type Discipline    CK 02/06/24 -  Soledad Leslie PT Physical Therapist PT                  PT Recommendation and Plan  Planned Therapy Interventions (PT): balance training, bed mobility training, gait training, home exercise program, neuromuscular re-education, transfer training, stretching, strengthening, stair training, ROM (range of motion), postural re-education, patient/family education  Plan of Care Reviewed With: patient  Outcome Evaluation: Patient presents with deficits in strength, endurance, and balance. She was able to ambulate 100' SBA with FWW in room with very slow pace due to complaints of weakness. IPPT is indicated to address current deficits. Recommend D/C home with assist and HHPT with use of FWW when medically appropriate.     Time Calculation:   PT Evaluation Complexity  History, PT Evaluation Complexity: 3 or more personal factors and/or comorbidities  Examination of Body Systems (PT Eval Complexity): total of 3 or more elements  Clinical Presentation (PT Evaluation Complexity): stable  Clinical Decision Making (PT Evaluation Complexity): low complexity  Overall Complexity (PT Evaluation Complexity): low complexity     PT Charges       Row Name 10/04/24 1157             Time Calculation    Start Time 1057  -CK      PT Received On 10/04/24  -CK      PT Goal Re-Cert Due Date 10/14/24  -CK         Untimed Charges    PT Eval/Re-eval Minutes 50  -CK         Total Minutes    Untimed Charges Total Minutes 50  -CK       Total Minutes 50  -CK                User Key  (r) = Recorded By, (t) = Taken By, (c) = Cosigned By      Initials Name Provider Type    CK Soledad Leslie PT Physical Therapist                  Therapy Charges for Today       Code Description Service Date Service Provider Modifiers Qty    [id number] HC PT EVAL LOW COMPLEXITY 4 10/4/2024 Soledad Leslie PT GP 1            PT  G-Codes  Outcome Measure Options: AM-PAC 6 Clicks Basic Mobility (PT)  AM-PAC 6 Clicks Score (PT): 19  AM-PAC 6 Clicks Score (OT): 16  PT Discharge Summary  Anticipated Discharge Disposition (PT): home with assist, home with home health    Soledad Leslie, PT  10/4/2024

## 2024-10-04 NOTE — PROGRESS NOTES
[facility] Medicine Services  PROGRESS NOTE    Patient Name: Carolyn Snowden  : 1950  MRN: [id number]    Date of Admission: 10/2/2024  Primary Care Physician: Ezekiel Vigil APRN    Subjective   Subjective     CC: norovirus    HPI:  No diarrhea or vomiting overnight or through day  Diarrhea in evening restarted - not comfortable returning home. But is drinking OK      Objective   Objective     Vital Signs:   Temp:  [97.4 °F (36.3 °C)-98.9 °F (37.2 °C)] 98.3 °F (36.8 °C)  Heart Rate:  [60-82] 76  Resp:  [16-18] 18  BP: (144-185)/(58-84) 185/84     Physical Exam:  Constitutional: No acute distress, awake, alert  HENT: NCAT, mucous membranes moist  Respiratory: Clear to auscultation bilaterally, respiratory effort normal   Cardiovascular: RRR, no murmurs, rubs, or gallops  Gastrointestinal: Soft, nontender, nondistended  Musculoskeletal: Muscle tone within normal limits, no joint effusions appreciated  Psychiatric: Appropriate affect, cooperative  Neurologic: Alert and oriented, facial movements symmetric and spontaneous movement of all 4 extremities grossly equal bilaterally, speech clear  Skin: No rashes    Results Reviewed:  LAB RESULTS:      Lab 10/03/24  0100 10/02/24  2231   WBC  --  11.24*   HEMOGLOBIN  --  13.6   HEMATOCRIT  --  42.4   PLATELETS  --  255   NEUTROS ABS  --  9.91*   IMMATURE GRANS (ABS)  --  0.03   LYMPHS ABS  --  0.74   MONOS ABS  --  0.49   EOS ABS  --  0.04   MCV  --  84.5   PROCALCITONIN 0.10  --    LACTATE  --  1.5   HSTROP T 13 15*         Lab 10/04/24  0621 10/02/24  2231   SODIUM 143 143   POTASSIUM 3.4* 3.7   CHLORIDE 112* 104   CO2 25.0 25.0   ANION GAP 6.0 14.0   BUN 7* 16   CREATININE 0.82 1.05*   EGFR 74.2 55.2*   GLUCOSE 98 99   CALCIUM 8.2* 9.9         Lab 10/02/24  2231   TOTAL PROTEIN 8.1   ALBUMIN 4.8   GLOBULIN 3.3   ALT (SGPT) 13   AST (SGOT) 19   BILIRUBIN 0.5   ALK PHOS 63         Lab 10/03/24  0100 10/02/24  2231   HSTROP T 13 15*                  Lab 10/02/24  2343   PH, ARTERIAL 7.610*   PCO2, ARTERIAL 17.7*   PO2 .0   FIO2 21   HCO3 ART 17.8*   BASE EXCESS ART -1.3*   CARBOXYHEMOGLOBIN 0.9     Brief Urine Lab Results  (Last result in the past 365 days)        Color   Clarity   Blood   Leuk Est   Nitrite   Protein   CREAT   Urine HCG        10/03/24 0137 Yellow   Clear   Negative   Moderate (2+)   Negative   Negative                   Microbiology Results Abnormal       Procedure Component Value - Date/Time    Urine Culture - Urine, Urine, Catheter [[id number]]  (Normal) Collected: 10/03/24 0137    Lab Status: Final result Specimen: Urine, Catheter Updated: 10/04/24 1322     Urine Culture No growth    Blood Culture - Blood, Hand, Right [[id number]]  (Normal) Collected: 10/03/24 0150    Lab Status: Preliminary result Specimen: Blood from Hand, Right Updated: 10/04/24 0215     Blood Culture No growth at 24 hours    Blood Culture - Blood, Arm, Left [[id number]]  (Normal) Collected: 10/03/24 0120    Lab Status: Preliminary result Specimen: Blood from Arm, Left Updated: 10/04/24 0215     Blood Culture No growth at 24 hours    Narrative:      Less than seven (7) mL's of blood was collected.  Insufficient quantity may yield false negative results.    Clostridioides difficile Toxin - Stool, Per Rectum [[id number]]  (Normal) Collected: 10/03/24 0001    Lab Status: Final result Specimen: Stool from Per Rectum Updated: 10/03/24 0832    Narrative:      The following orders were created for panel order Clostridioides difficile Toxin - Stool, Per Rectum.  Procedure                               Abnormality         Status                     ---------                               -----------         ------                     Clostridioides difficile...[[id number]]  Normal              Final result                 Please view results for these tests on the individual orders.    Clostridioides difficile Toxin, PCR - Stool, Per Rectum [[id number]]   (Normal) Collected: 10/03/24 0001    Lab Status: Final result Specimen: Stool from Per Rectum Updated: 10/03/24 0832     Toxigenic C. difficile by PCR Not Detected    Narrative:      The result indicates the absence of toxigenic C. difficile from stool specimen.     COVID PRE-OP / PRE-PROCEDURE SCREENING ORDER (NO ISOLATION) - Swab, Nasopharynx [[id number]]  (Normal) Collected: 10/02/24 2227    Lab Status: Final result Specimen: Swab from Nasopharynx Updated: 10/02/24 2300    Narrative:      The following orders were created for panel order COVID PRE-OP / PRE-PROCEDURE SCREENING ORDER (NO ISOLATION) - Swab, Nasopharynx.  Procedure                               Abnormality         Status                     ---------                               -----------         ------                     COVID-19 and FLU A/B PCR...[[id number]]  Normal              Final result                 Please view results for these tests on the individual orders.    COVID-19 and FLU A/B PCR, 1 HR TAT - Swab, Nasopharynx [[id number]]  (Normal) Collected: 10/02/24 2227    Lab Status: Final result Specimen: Swab from Nasopharynx Updated: 10/02/24 2300     COVID19 Not Detected     Influenza A PCR Not Detected     Influenza B PCR Not Detected    Narrative:      Fact sheet for providers: https://www.fda.gov/media/604866/download    Fact sheet for patients: https://www.fda.gov/media/833152/download    Test performed by PCR.            CT Head Without Contrast    Result Date: 10/3/2024  CT HEAD WO CONTRAST Date of Exam: 10/3/2024 4:32 AM EDT Indication: Weakness. Comparison: 9/7/2024 and brain MRI same date. Technique: Axial CT images were obtained of the head without contrast administration.  Automated exposure control and iterative construction methods were used. Findings: Superficial soft tissues appear within normal limits. The calvarium is intact.  Paranasal sinuses and mastoid air cells appear well aerated. There is thinning of the orbital  lenses bilaterally suggesting prior lens replacement. There is no acute intracranial hemorrhage.  No mass effect or midline shift.  No abnormal extra-axial collections.  Gray-white differentiation is within normal limits. There is mild patchy white matter hypoattenuation. Ventricular size and configuration is normal for age.     Impression: Impression: 1.No acute intracranial abnormality. 2.Mild chronic small vessel ischemic change. Electronically Signed: Jules Manuel MD  10/3/2024 4:57 AM EDT  Workstation ID: [id number]    CT Angiogram Chest    Result Date: 10/3/2024  CT ANGIOGRAM CHEST Date of Exam: 10/3/2024 1:09 AM EDT Indication: PE protocol. Comparison: Chest radiograph 10/2/2024. Technique: CTA of the chest was performed after the uneventful intravenous administration of 90 mL Isovue-370. Reconstructed coronal and sagittal images were also obtained. In addition, a 3-D volume rendered image was created for interpretation. Automated exposure control and iterative reconstruction methods were used. Findings: Thyroid contains a nodule at the inferior left lobe measuring 13 mm. This could be further evaluated with ultrasound follow-up. The heart is mildly enlarged. There is a small circumferential pericardial effusion. The aorta and aortic branch vessels appear normal. Main pulmonary artery is normal diameter. There is no evidence of pulmonary embolism. No mediastinal lymphadenopathy. The trachea and the esophagus appear within normal limits. There is dependent bibasilar atelectasis. No pneumothorax, pleural effusion or focal pneumonia. Airways are patent. No significant pleural disease. There are no acute findings in the superficial soft tissues. Findings in the abdomen discussed in a separate report. There is no acute osseous abnormality or destructive bone lesion. No significant thoracic degenerative changes.     Impression: Impression: 1.No evidence of pulmonary embolism. 2.Mild cardiomegaly and small  pericardial effusion. 3.Dependent bibasilar atelectasis. Electronically Signed: Jules Manuel MD  10/3/2024 2:12 AM EDT  Workstation ID: [id number]    CT Abdomen Pelvis With Contrast    Result Date: 10/3/2024  CT ABDOMEN PELVIS W CONTRAST Date of Exam: 10/3/2024 1:09 AM EDT Indication: Diarrhea, sepsis, unknown source. Comparison: None available. Technique: Axial CT images were obtained of the abdomen and pelvis following the uneventful intravenous administration of 90 mL of Isovue-370. Reconstructed coronal and sagittal images were also obtained. Automated exposure control and iterative construction methods were used. Findings: Findings in the chest discussed in a separate report. Exam limited by the patient's arms resulting in streak and beam hardening artifact. No acute findings in the superficial soft tissues. No acute osseous abnormality or destructive bone lesion. There is  3 mm anterolisthesis of L4 on L5 and there is mild lumbar disc and moderate facet joint degeneration. The liver, gallbladder, bile ducts, pancreas and pancreatic duct, spleen and adrenal glands on the left appear within normal limits. There is a low-density right adrenal nodule measuring 19 mm consistent with an adenoma. The kidneys are normal. No hydronephrosis. The urinary bladder, uterus and adnexal areas appear unremarkable. The appendix is normal. There is liquid stool in the proximal colon suggesting a diarrheal state. There is mild distal colonic diverticulosis. Rectal wall thickening could  relate to proctitis. No small bowel distention. The abdominal and pelvic vasculature appear within normal limits. No ascites, pneumoperitoneum or lymphadenopathy.     Impression: Impression: 1.Rectal wall thickening could relate to proctitis. 2.Liquid stool in the proximal colon suggesting a diarrheal state. 3.Mild colonic diverticulosis. 4.Lumbar degenerative changes. Electronically Signed: Jules Manuel MD  10/3/2024 2:10 AM EDT  Workstation  ID: [id number]    XR Chest 1 View    Result Date: 10/3/2024  XR CHEST 1 VW Date of Exam: 10/2/2024 11:41 PM EDT Indication: Short of breath, chest pain Comparison: 9/7/2024. Findings: There is no pneumothorax, pleural effusion or focal airspace consolidation. Heart size and pulmonary vasculature appear within normal limits. Regional bones appear intact.     Impression: Impression: No acute cardiopulmonary abnormality. Electronically Signed: Jules Manuel MD  10/3/2024 12:44 AM EDT  Workstation ID: [id number]         Current medications:  Scheduled Meds:atorvastatin, 10 mg, Oral, Daily  memantine, 10 mg, Oral, Q12H  [START ON 10/5/2024] NIFEdipine XL, 30 mg, Oral, Q24H  sodium chloride, 10 mL, Intravenous, Q12H      Continuous Infusions:   PRN Meds:.  acetaminophen **OR** acetaminophen **OR** acetaminophen    clonazePAM    melatonin    ondansetron ODT **OR** ondansetron    sodium chloride    sodium chloride    Assessment & Plan   Assessment & Plan     Active Hospital Problems    Diagnosis  POA    **Norovirus [A08.11]  Yes    Moderate malnutrition [E44.0]  Yes    Pure hypercholesterolemia [E78.00]  Yes    Anxiety and depression [F41.9, F32.A]  Yes    Essential hypertension [I10]  Yes      Resolved Hospital Problems   No resolved problems to display.        Brief Hospital Course to date:  Carolyn Snowden is a 74 y.o. female w anxiety/depression who presented with acute nausea/vomiting/diarrhea. Found to have norovirus    Acute norovirus illness  -supportive care, encourage PO intake (IVF shortage nationally)  -home when able to have adequate support to facilitate    HTN - home nifedipine, hold HCTZ while dealing w the above  Memory impairment - home memantine (formulary equivalent)  Anxiety - home benzo  HLD - home statine    Expected Discharge Location and Transportation: home  Expected Discharge 10/5 (Discharge date is tentative pending patient's medical condition and is subject to change)  Expected Discharge Date:  10/5/2024; Expected Discharge Time:      VTE Prophylaxis:  Mechanical VTE prophylaxis orders are present.         AM-PAC 6 Clicks Score (PT): 18 (10/04/24 1200)    CODE STATUS:   Code Status and Medical Interventions: No CPR (Do Not Attempt to Resuscitate); Limited Support; No intubation (DNI)   Ordered at: 10/03/24 0413     Medical Intervention Limits:    No intubation (DNI)     Code Status (Patient has no pulse and is not breathing):    No CPR (Do Not Attempt to Resuscitate)     Medical Interventions (Patient has pulse or is breathing):    Limited Support       Lizbeth Underwood MD  10/04/24

## 2024-10-04 NOTE — PLAN OF CARE
Goal Outcome Evaluation:  Plan of Care Reviewed With: patient           Outcome Evaluation: Patient presents with deficits in strength, endurance, and balance. She was able to ambulate 100' SBA with FWW in room with very slow pace due to complaints of weakness. IPPT is indicated to address current deficits. Recommend D/C home with assist and HHPT with use of FWW when medically appropriate.      Anticipated Discharge Disposition (PT): home with assist, home with home health

## 2024-10-04 NOTE — PROGRESS NOTES
Malnutrition Severity Assessment    Patient Name:  Carolyn Snowden  YOB: 1950  MRN: [id number]  Admit Date:  10/2/2024    Patient meets criteria for : Moderate (non-severe) Malnutrition (Pt meets criteria for moderate acute malnutrition based on wasting.)    Comments:      Malnutrition Severity Assessment  Malnutrition Type: Acute Disease or Injury - Related Malnutrition  Malnutrition Type (Last 8 Hours)       Malnutrition Severity Assessment       Row Name 10/03/24 2204       Malnutrition Severity Assessment    Malnutrition Type Acute Disease or Injury - Related Malnutrition      Row Name 10/03/24 2204       Insufficient Energy Intake     Insufficient Energy Intake Findings --  Pt allows 3 days of minimal intake PTA      Row Name 10/03/24 2204       Unintentional Weight Loss     Unintentional Weight Loss Findings --  unable to confirm      [facility] Name 10/03/24 2204       Muscle Loss    Loss of Muscle Mass Findings Mild    [place] Region --  mild    Clavicle Bone Region --  mild    Acromion Bone Region None    Scapular Bone Region --  mild    Dorsal Hand Region None    Patellar Region --  mild    Anterior Thigh Region None    Posterior Calf Region Moderate - some roundness, slight firmness      Row Name 10/03/24 2204       Fat Loss    Subcutaneous Fat Loss Findings Mild    Orbital Region  --  mild    Upper Arm Region None    Thoracic & Lumbar Region --  mild      Row Name 10/03/24 2204       Criteria Met (Must meet criteria for severity in at least 2 of these categories: M Wasting, Fat Loss, Fluid, Secondary Signs, Wt. Status, Intake)    Patient meets criteria for  Moderate (non-severe) Malnutrition  Pt meets criteria for moderate acute malnutrition based on wasting.                    Electronically signed by:  Thelma Gonzales RD  10/03/24 22:14 EDT

## 2024-10-05 ENCOUNTER — READMISSION MANAGEMENT (OUTPATIENT)
Dept: CALL CENTER | Facility: HOSPITAL | Age: 74
End: 2024-10-05
Payer: MEDICARE

## 2024-10-05 VITALS
RESPIRATION RATE: 18 BRPM | HEART RATE: 63 BPM | SYSTOLIC BLOOD PRESSURE: 159 MMHG | DIASTOLIC BLOOD PRESSURE: 68 MMHG | BODY MASS INDEX: 23.6 KG/M2 | TEMPERATURE: 96.5 F | HEIGHT: 61 IN | WEIGHT: 125 LBS | OXYGEN SATURATION: 100 %

## 2024-10-05 PROCEDURE — 99239 HOSP IP/OBS DSCHRG MGMT >30: CPT | Performed by: PHYSICIAN ASSISTANT

## 2024-10-05 PROCEDURE — G0378 HOSPITAL OBSERVATION PER HR: HCPCS

## 2024-10-05 RX ORDER — LOPERAMIDE HYDROCHLORIDE 2 MG/1
2 TABLET ORAL 4 TIMES DAILY PRN
Qty: 12 TABLET | Refills: 0 | Status: SHIPPED | OUTPATIENT
Start: 2024-10-05 | End: 2024-10-07

## 2024-10-05 RX ADMIN — NIFEDIPINE 30 MG: 30 TABLET, FILM COATED, EXTENDED RELEASE ORAL at 09:41

## 2024-10-05 RX ADMIN — MEMANTINE 10 MG: 10 TABLET ORAL at 09:41

## 2024-10-05 NOTE — OUTREACH NOTE
Prep Survey      Flowsheet Row Responses   [facility] facility patient discharged from? [place]   Is LACE score < 7 ? No   Eligibility TCM   Discharge diagnosis Norovirus (   Does the patient have one of the following disease processes/diagnoses(primary or secondary)? Other   Does the patient have Home health ordered? Yes   What is the Home health agency?  [facility]   Is there a DME ordered? No   Prep survey completed? Yes            AXEL BAUMANN - Registered Nurse

## 2024-10-05 NOTE — PLAN OF CARE
Problem: Adult Inpatient Plan of Care  Goal: Plan of Care Review  Outcome: Progressing  Flowsheets (Taken 10/5/2024 0526)  Progress: improving  Goal: Patient-Specific Goal (Individualized)  Outcome: Progressing  Goal: Absence of Hospital-Acquired Illness or Injury  Outcome: Progressing  Intervention: Identify and Manage Fall Risk  Recent Flowsheet Documentation  Taken 10/5/2024 0200 by Benny Leong RN  Safety Promotion/Fall Prevention:   safety round/check completed   nonskid shoes/slippers when out of bed   clutter free environment maintained   assistive device/personal items within reach  Taken 10/5/2024 0000 by Benny Leong RN  Safety Promotion/Fall Prevention:   safety round/check completed   nonskid shoes/slippers when out of bed   clutter free environment maintained   assistive device/personal items within reach  Taken 10/4/2024 2200 by Benny Leong RN  Safety Promotion/Fall Prevention:   safety round/check completed   nonskid shoes/slippers when out of bed   clutter free environment maintained   assistive device/personal items within reach  Taken 10/4/2024 2000 by Benny Leong RN  Safety Promotion/Fall Prevention:   safety round/check completed   nonskid shoes/slippers when out of bed   clutter free environment maintained   assistive device/personal items within reach  Intervention: Prevent Infection  Recent Flowsheet Documentation  Taken 10/5/2024 0200 by Benny Leong RN  Infection Prevention: environmental surveillance performed  Taken 10/5/2024 0000 by Benny Leong RN  Infection Prevention: environmental surveillance performed  Taken 10/4/2024 2200 by Benny Leong RN  Infection Prevention: environmental surveillance performed  Taken 10/4/2024 2000 by Benny Leong RN  Infection Prevention: environmental surveillance performed  Goal: Optimal Comfort and Wellbeing  Outcome: Progressing  Goal: Readiness for Transition of Care  Outcome: Progressing    Goal Outcome Evaluation:           Progress: improving

## 2024-10-05 NOTE — DISCHARGE SUMMARY
[facility] Medicine Services  DISCHARGE SUMMARY    Patient Name: Carolyn Snowden  : 1950  MRN: [id number]    Date of Admission: 10/2/2024 10:01 PM  Date of Discharge:  10/5/2024  Primary Care Physician: Ezekiel Vigil APRN    Consults       Date and Time Order Name Status Description    2024  5:20 PM Inpatient Neurology Consult Stroke Completed           Hospital Course     Active Hospital Problems    Diagnosis  POA    **Norovirus [A08.11]  Yes    Moderate malnutrition [E44.0]  Yes    Pure hypercholesterolemia [E78.00]  Yes    Anxiety and depression [F41.9, F32.A]  Yes    Essential hypertension [I10]  Yes      Resolved Hospital Problems   No resolved problems to display.     Hospital Course:  Carolyn Snowden is a 74 y.o. female with PMH significant for HTN, HLD, anxiety/depression and memory impairment. She presented to [facility] ED on 10/2/2024 for evaluation of acute nausea/vomiting/diarrhea. She was found to have Norovirus. She was treated supportively with improvement.     Acute norovirus illness with associated nausea/vomiting/diarrhea   - Received supportive care with IV fluids, PRN antiemetics  - Diarrhea has resolved. PO intake still lagging but she reports she has Boost/Ensure at home and can drink when she does not feel up to eating a full meal      HTN, continue home Nifedipine. Continue to hold home HCTZ at DC until follow up with PCP  Memory impairment, resumed home Namenda   Anxiety, continue home Clonazepam   HLD, continue home statin    Has follow up with PCP on Monday 10/7    Day of Discharge     HPI:   Resting in bed. Continues to feel a bit better today. No BM yesterday. No nausea or vomiting but not much appetite yet. Abdomen still feels crampy but pain has improved. Primary issue is logistics of getting home - says her daughter took her car home to [place] and is now sick with the same thing she had.     Review of Systems  Gen-  No fevers, chills  CV- No chest pain, palpitations  Resp- No cough, dyspnea  GI- as above    Vital Signs:   Temp:  [97.4 °F (36.3 °C)-98.6 °F (37 °C)] 97.8 °F (36.6 °C)  Heart Rate:  [56-76] 56  Resp:  [18] 18  BP: (145-185)/(67-84) 159/77    Physical Exam:  Constitutional: No acute distress, awake, alert and conversant  HENT: NCAT, mucous membranes moist  Respiratory: Clear to auscultation bilaterally, respiratory effort normal   Cardiovascular: RRR  Gastrointestinal: Positive bowel sounds, soft, nontender, nondistended  Musculoskeletal: No bilateral ankle edema  Psychiatric: Appropriate affect, cooperative with exam  Neurologic: Oriented x 3, moves all extremities spontaneously without focal deficits, speech clear  Skin: No rashes to exposed surfaces     Pertinent  and/or Most Recent Results     LAB RESULTS:      Lab 10/03/24  0100 10/02/24  2231   WBC  --  11.24*   HEMOGLOBIN  --  13.6   HEMATOCRIT  --  42.4   PLATELETS  --  255   NEUTROS ABS  --  9.91*   IMMATURE GRANS (ABS)  --  0.03   LYMPHS ABS  --  0.74   MONOS ABS  --  0.49   EOS ABS  --  0.04   MCV  --  84.5   PROCALCITONIN 0.10  --    LACTATE  --  1.5         Lab 10/04/24  0621 10/02/24  2231   SODIUM 143 143   POTASSIUM 3.4* 3.7   CHLORIDE 112* 104   CO2 25.0 25.0   ANION GAP 6.0 14.0   BUN 7* 16   CREATININE 0.82 1.05*   EGFR 74.2 55.2*   GLUCOSE 98 99   CALCIUM 8.2* 9.9         Lab 10/02/24  2231   TOTAL PROTEIN 8.1   ALBUMIN 4.8   GLOBULIN 3.3   ALT (SGPT) 13   AST (SGOT) 19   BILIRUBIN 0.5   ALK PHOS 63         Lab 10/03/24  0100 10/02/24  2231   HSTROP T 13 15*         Lab 10/02/24  2343   PH, ARTERIAL 7.610*   PCO2, ARTERIAL 17.7*   PO2 .0   FIO2 21   HCO3 ART 17.8*   BASE EXCESS ART -1.3*   CARBOXYHEMOGLOBIN 0.9     Brief Urine Lab Results  (Last result in the past 365 days)        Color   Clarity   Blood   Leuk Est   Nitrite   Protein   CREAT   Urine HCG        10/03/24 0137 Yellow   Clear   Negative   Moderate (2+)   Negative   Negative                  Microbiology Results (last 10 days)       Procedure Component Value - Date/Time    Blood Culture - Blood, Hand, Right [[id number]]  (Normal) Collected: 10/03/24 0150    Lab Status: Preliminary result Specimen: Blood from Hand, Right Updated: 10/05/24 0215     Blood Culture No growth at 2 days    Urine Culture - Urine, Urine, Catheter [[id number]]  (Normal) Collected: 10/03/24 0137    Lab Status: Final result Specimen: Urine, Catheter Updated: 10/04/24 1322     Urine Culture No growth    Blood Culture - Blood, Arm, Left [[id number]]  (Normal) Collected: 10/03/24 0120    Lab Status: Preliminary result Specimen: Blood from Arm, Left Updated: 10/05/24 0215     Blood Culture No growth at 2 days    Narrative:      Less than seven (7) mL's of blood was collected.  Insufficient quantity may yield false negative results.    Clostridioides difficile Toxin - Stool, Per Rectum [[id number]]  (Normal) Collected: 10/03/24 0001    Lab Status: Final result Specimen: Stool from Per Rectum Updated: 10/03/24 0832    Narrative:      The following orders were created for panel order Clostridioides difficile Toxin - Stool, Per Rectum.  Procedure                               Abnormality         Status                     ---------                               -----------         ------                     Clostridioides difficile...[[id number]]  Normal              Final result                 Please view results for these tests on the individual orders.    Clostridioides difficile Toxin, PCR - Stool, Per Rectum [[id number]]  (Normal) Collected: 10/03/24 0001    Lab Status: Final result Specimen: Stool from Per Rectum Updated: 10/03/24 0832     Toxigenic C. difficile by PCR Not Detected    Narrative:      The result indicates the absence of toxigenic C. difficile from stool specimen.     Gastrointestinal Panel, PCR - Stool, Per Rectum [[id number]]  (Abnormal) Collected: 10/02/24 2257    Lab Status: Final result Specimen: Stool  from Per Rectum Updated: 10/03/24 0919     Campylobacter Not Detected     Plesiomonas shigelloides Not Detected     Salmonella Not Detected     Vibrio Not Detected     Vibrio cholerae Not Detected     Yersinia enterocolitica Not Detected     Enteroaggregative E. coli (EAEC) Not Detected     Enteropathogenic E. coli (EPEC) Not Detected     Enterotoxigenic E. coli (ETEC) lt/st Not Detected     Shiga-like toxin-producing E. coli (STEC) stx1/stx2 Not Detected     Shigella/Enteroinvasive E. coli (EIEC) Not Detected     Cryptosporidium Not Detected     Cyclospora cayetanensis Not Detected     Entamoeba histolytica Not Detected     Giardia lamblia Not Detected     Adenovirus F40/41 Not Detected     Astrovirus Not Detected     Norovirus GI/GII Detected     Comment: If a positive Norovirus result is inconsistent with clinical presentation, the positive Norovirus result should be confirmed using another method.        Rotavirus A Not Detected     Sapovirus (I, II, IV or V) Not Detected    COVID PRE-OP / PRE-PROCEDURE SCREENING ORDER (NO ISOLATION) - Swab, Nasopharynx [[id number]]  (Normal) Collected: 10/02/24 2227    Lab Status: Final result Specimen: Swab from Nasopharynx Updated: 10/02/24 2300    Narrative:      The following orders were created for panel order COVID PRE-OP / PRE-PROCEDURE SCREENING ORDER (NO ISOLATION) - Swab, Nasopharynx.  Procedure                               Abnormality         Status                     ---------                               -----------         ------                     COVID-19 and FLU A/B PCR...[[id number]]  Normal              Final result                 Please view results for these tests on the individual orders.    COVID-19 and FLU A/B PCR, 1 HR TAT - Swab, Nasopharynx [[id number]]  (Normal) Collected: 10/02/24 2227    Lab Status: Final result Specimen: Swab from Nasopharynx Updated: 10/02/24 2300     COVID19 Not Detected     Influenza A PCR Not Detected     Influenza B PCR Not  Detected    Narrative:      Fact sheet for providers: https://www.fda.gov/media/389498/download    Fact sheet for patients: https://www.fda.gov/media/640101/download    Test performed by PCR.          CT Head Without Contrast    Result Date: 10/3/2024  CT HEAD WO CONTRAST Date of Exam: 10/3/2024 4:32 AM EDT Indication: Weakness. Comparison: 9/7/2024 and brain MRI same date. Technique: Axial CT images were obtained of the head without contrast administration.  Automated exposure control and iterative construction methods were used. Findings: Superficial soft tissues appear within normal limits. The calvarium is intact.  Paranasal sinuses and mastoid air cells appear well aerated. There is thinning of the orbital lenses bilaterally suggesting prior lens replacement. There is no acute intracranial hemorrhage.  No mass effect or midline shift.  No abnormal extra-axial collections.  Gray-white differentiation is within normal limits. There is mild patchy white matter hypoattenuation. Ventricular size and configuration is normal for age.     Impression: 1.No acute intracranial abnormality. 2.Mild chronic small vessel ischemic change. Electronically Signed: Jules Manuel MD  10/3/2024 4:57 AM EDT  Workstation ID: [id number]    CT Angiogram Chest    Result Date: 10/3/2024  CT ANGIOGRAM CHEST Date of Exam: 10/3/2024 1:09 AM EDT Indication: PE protocol. Comparison: Chest radiograph 10/2/2024. Technique: CTA of the chest was performed after the uneventful intravenous administration of 90 mL Isovue-370. Reconstructed coronal and sagittal images were also obtained. In addition, a 3-D volume rendered image was created for interpretation. Automated exposure control and iterative reconstruction methods were used. Findings: Thyroid contains a nodule at the inferior left lobe measuring 13 mm. This could be further evaluated with ultrasound follow-up. The heart is mildly enlarged. There is a small circumferential pericardial effusion.  The aorta and aortic branch vessels appear normal. Main pulmonary artery is normal diameter. There is no evidence of pulmonary embolism. No mediastinal lymphadenopathy. The trachea and the esophagus appear within normal limits. There is dependent bibasilar atelectasis. No pneumothorax, pleural effusion or focal pneumonia. Airways are patent. No significant pleural disease. There are no acute findings in the superficial soft tissues. Findings in the abdomen discussed in a separate report. There is no acute osseous abnormality or destructive bone lesion. No significant thoracic degenerative changes.     Impression: 1.No evidence of pulmonary embolism. 2.Mild cardiomegaly and small pericardial effusion. 3.Dependent bibasilar atelectasis. Electronically Signed: Jules Manuel MD  10/3/2024 2:12 AM EDT  Workstation ID: [id number]    CT Abdomen Pelvis With Contrast    Result Date: 10/3/2024  CT ABDOMEN PELVIS W CONTRAST Date of Exam: 10/3/2024 1:09 AM EDT Indication: Diarrhea, sepsis, unknown source. Comparison: None available. Technique: Axial CT images were obtained of the abdomen and pelvis following the uneventful intravenous administration of 90 mL of Isovue-370. Reconstructed coronal and sagittal images were also obtained. Automated exposure control and iterative construction methods were used. Findings: Findings in the chest discussed in a separate report. Exam limited by the patient's arms resulting in streak and beam hardening artifact. No acute findings in the superficial soft tissues. No acute osseous abnormality or destructive bone lesion. There is  3 mm anterolisthesis of L4 on L5 and there is mild lumbar disc and moderate facet joint degeneration. The liver, gallbladder, bile ducts, pancreas and pancreatic duct, spleen and adrenal glands on the left appear within normal limits. There is a low-density right adrenal nodule measuring 19 mm consistent with an adenoma. The kidneys are normal. No hydronephrosis. The  urinary bladder, uterus and adnexal areas appear unremarkable. The appendix is normal. There is liquid stool in the proximal colon suggesting a diarrheal state. There is mild distal colonic diverticulosis. Rectal wall thickening could  relate to proctitis. No small bowel distention. The abdominal and pelvic vasculature appear within normal limits. No ascites, pneumoperitoneum or lymphadenopathy.     Impression: 1.Rectal wall thickening could relate to proctitis. 2.Liquid stool in the proximal colon suggesting a diarrheal state. 3.Mild colonic diverticulosis. 4.Lumbar degenerative changes. Electronically Signed: Jules Manuel MD  10/3/2024 2:10 AM EDT  Workstation ID: [id number]    XR Chest 1 View    Result Date: 10/3/2024  XR CHEST 1 VW Date of Exam: 10/2/2024 11:41 PM EDT Indication: Short of breath, chest pain Comparison: 9/7/2024. Findings: There is no pneumothorax, pleural effusion or focal airspace consolidation. Heart size and pulmonary vasculature appear within normal limits. Regional bones appear intact.     Impression: No acute cardiopulmonary abnormality. Electronically Signed: Jules Manuel MD  10/3/2024 12:44 AM EDT  Workstation ID: [id number]     Discharge Details        Discharge Medications        New Medications        Instructions Start Date   loperamide 2 MG tablet  Commonly known as: Imodium A-D   2 mg, Oral, 4 Times Daily PRN      ondansetron ODT 4 MG disintegrating tablet  Commonly known as: ZOFRAN-ODT   4 mg, Oral, Every 6 Hours PRN             Continue These Medications        Instructions Start Date   atorvastatin 10 MG tablet  Commonly known as: LIPITOR   10 mg, Oral, Daily      clonazePAM 1 MG tablet  Commonly known as: KlonoPIN   1 mg, Oral, 2 Times Daily PRN      meclizine 25 MG tablet  Commonly known as: ANTIVERT   Take 0.5-1 tablet PO three dimes daily as needed for dizziness      memantine 28 MG capsule sustained-release 24 hr extended release capsule  Commonly known as: NAMENDA XR   28  mg, Oral, Nightly      NIFEdipine XL 30 MG 24 hr tablet  Commonly known as: PROCARDIA XL   30 mg, Oral, Daily      triamcinolone 0.1 % cream  Commonly known as: KENALOG   1 Application, Topical, 2 Times Daily PRN      trimethoprim-polymyxin b 12128-7.1 UNIT/ML-% ophthalmic solution  Commonly known as: POLYTRIM   ADMINISTER 1 DROP TO THE RIGHT EYE EVERY 4 HOURS.             Stop These Medications      hydroCHLOROthiazide 12.5 MG tablet            Allergies   Allergen Reactions    Ace Inhibitors Swelling    Donepezil Other (See Comments)     nightmares     Discharge Disposition:Home or Self Care    Diet:  Diet Instructions       Advance Diet As Tolerated -Target Diet: Start with bland diet and advance as tolerated. Stay hydrated and drink Boost or Ensure if you don't feel like eating a meal      Target Diet: Start with bland diet and advance as tolerated. Stay hydrated and drink Boost or Ensure if you don't feel like eating a meal          Activity:  Activity Instructions       Activity as Tolerated            CODE STATUS:    Code Status and Medical Interventions: No CPR (Do Not Attempt to Resuscitate); Limited Support; No intubation (DNI)   Ordered at: 10/03/24 0413     Medical Intervention Limits:    No intubation (DNI)     Code Status (Patient has no pulse and is not breathing):    No CPR (Do Not Attempt to Resuscitate)     Medical Interventions (Patient has pulse or is breathing):    Limited Support     Future Appointments   Date Time Provider Department Center   10/7/2024 10:30 AM Leola Andrews APRN [facility]   12/10/2024  1:15 PM Ezekiel Vigil APRN [facility]     Additional Instructions for the Follow-ups that You Need to Schedule       Ambulatory Referral to Home Health   As directed      Face to Face Visit Date: 10/4/2024   Follow-up provider for Plan of Care?: I treated the patient in an acute care facility and will not continue treatment after discharge.   Follow-up provider:  EZEKIEL GOETZ [[id number]]   Reason/Clinical Findings: weakness, norovirus   Describe mobility limitations that make leaving home difficult: imapired functional mobility, balance, gait and endurance   Nursing/Therapeutic Services Requested: Physical Therapy Occupational Therapy   Frequency: 1 Week 1        Ambulatory Referral to Home Health   As directed      Face to Face Visit Date: 10/4/2024   Follow-up provider for Plan of Care?: I treated the patient in an acute care facility and will not continue treatment after discharge.   Follow-up provider: EZEKIEL GOETZ [[id number]]   Reason/Clinical Findings: weakness, norovirus   Describe mobility limitations that make leaving home difficult: impaired functional mobility, balance , gait and endurance   Nursing/Therapeutic Services Requested: Skilled Nursing Physical Therapy Occupational Therapy   Skilled nursing orders: Cardiopulmonary assessments Neurovascular assessments Medication education   PT orders: Therapeutic exercise Gait Training Transfer training Strengthening Home safety assessment   Weight Bearing Status: As Tolerated   Occupational orders: Activities of daily living Energy conservation Strengthening Home safety assessment   Frequency: 1 Week 1        Discharge Follow-up with PCP   As directed       Currently Documented PCP:    Ezekiel Goetz APRN    PCP Phone Number:    [phone]     Follow Up Details: 1 week - repeat BP check and possible restart HCTZ if better              Hilaria Chisholm PA-C  10/05/24    Time Spent on Discharge:  I spent 35 minutes on this discharge activity which included: face-to-face encounter with the patient, reviewing the data in the system, coordination of the care with the nursing staff as well as consultants, documentation, and entering orders.

## 2024-10-07 ENCOUNTER — OFFICE VISIT (OUTPATIENT)
Dept: NEUROLOGY | Facility: CLINIC | Age: 74
End: 2024-10-07
Payer: MEDICARE

## 2024-10-07 ENCOUNTER — TRANSITIONAL CARE MANAGEMENT TELEPHONE ENCOUNTER (OUTPATIENT)
Dept: CALL CENTER | Facility: HOSPITAL | Age: 74
End: 2024-10-07
Payer: MEDICARE

## 2024-10-07 VITALS
WEIGHT: 127 LBS | OXYGEN SATURATION: 100 % | HEART RATE: 77 BPM | DIASTOLIC BLOOD PRESSURE: 80 MMHG | SYSTOLIC BLOOD PRESSURE: 126 MMHG | BODY MASS INDEX: 24.94 KG/M2 | HEIGHT: 60 IN

## 2024-10-07 DIAGNOSIS — F02.A0 MILD LATE ONSET ALZHEIMER'S DEMENTIA WITHOUT BEHAVIORAL DISTURBANCE, PSYCHOTIC DISTURBANCE, MOOD DISTURBANCE, OR ANXIETY: ICD-10-CM

## 2024-10-07 DIAGNOSIS — R25.1 TREMOR OF BOTH HANDS: ICD-10-CM

## 2024-10-07 DIAGNOSIS — R26.9 GAIT ABNORMALITY: Primary | ICD-10-CM

## 2024-10-07 DIAGNOSIS — F32.A ANXIETY AND DEPRESSION: ICD-10-CM

## 2024-10-07 DIAGNOSIS — G30.1 MILD LATE ONSET ALZHEIMER'S DEMENTIA WITHOUT BEHAVIORAL DISTURBANCE, PSYCHOTIC DISTURBANCE, MOOD DISTURBANCE, OR ANXIETY: ICD-10-CM

## 2024-10-07 DIAGNOSIS — F41.9 ANXIETY AND DEPRESSION: ICD-10-CM

## 2024-10-07 PROBLEM — E78.5 HYPERLIPIDEMIA: Status: ACTIVE | Noted: 2024-05-10

## 2024-10-07 PROCEDURE — 1159F MED LIST DOCD IN RCRD: CPT | Performed by: NURSE PRACTITIONER

## 2024-10-07 PROCEDURE — 99214 OFFICE O/P EST MOD 30 MIN: CPT | Performed by: NURSE PRACTITIONER

## 2024-10-07 PROCEDURE — 1160F RVW MEDS BY RX/DR IN RCRD: CPT | Performed by: NURSE PRACTITIONER

## 2024-10-07 PROCEDURE — 3079F DIAST BP 80-89 MM HG: CPT | Performed by: NURSE PRACTITIONER

## 2024-10-07 PROCEDURE — 3074F SYST BP LT 130 MM HG: CPT | Performed by: NURSE PRACTITIONER

## 2024-10-07 RX ORDER — MEMANTINE HYDROCHLORIDE 28 MG/1
28 CAPSULE, EXTENDED RELEASE ORAL DAILY
Qty: 90 CAPSULE | Refills: 3 | Status: SHIPPED | OUTPATIENT
Start: 2024-10-07

## 2024-10-07 NOTE — OUTREACH NOTE
Call Center TCM Note      Flowsheet Row Responses   [facility] patient discharged from? [place] [facility]   Does the patient have one of the following disease processes/diagnoses(primary or secondary)? Other   TCM attempt successful? Yes   Call start time 1425   Call end time 1430   Discharge diagnosis Norovirus   Is patient permission given to speak with other caregiver? Yes   List who call center can speak with Verbal consent over the phone to speak with daughter--Magalys Ervin   Person spoke with today (if not patient) and relationship Patient and Daughter--Magalys Ervin   Meds reviewed with patient/caregiver? Yes   Is the patient having any side effects they believe may be caused by any medication additions or changes? No   Does the patient have all medications ordered at discharge? Yes   Is the patient taking all medications as directed (includes completed medication regime)? Yes   Comments Assisted patient and daughter in making a hospital f/u appt with PCP. Scheduled for 10/9/24 at 245 PM with JEAN-CLAUDE Lawson.   Does the patient have an appointment with their PCP within 7-14 days of discharge? No   Nursing Interventions Assisted patient with making appointment per protocol   What is the Home health agency?  [facility]   Has home health visited the patient within 72 hours of discharge? No   Psychosocial issues? No   Comments Patient reports no nausea, vomiting or diarrhea. Patient states she is forcing herself to eat, appetite is decreased. Reassured patient this is normal.   Did the patient receive a copy of their discharge instructions? Yes   Nursing interventions Reviewed instructions with patient   What is the patient's perception of their health status since discharge? Improving   Is the patient/caregiver able to teach back signs and symptoms related to disease process for when to call PCP? Yes   Is the patient/caregiver able to teach back signs and symptoms related to disease process for when to  call 911? Yes   Is the patient/caregiver able to teach back the hierarchy of who to call/visit for symptoms/problems? PCP, Specialist, Home health nurse, Urgent Care, ED, 911 Yes   If the patient is a current smoker, are they able to teach back resources for cessation? Not a smoker   TCM call completed? Yes   Wrap up additional comments Assisted patient and daughter in making a hospital f/u appt with PCP. Scheduled for 10/9/24 at 245 PM with JEAN-CLAUDE Lawson.   Call end time 1430   Would this patient benefit from a Referral to Amb Social Work? No   Is the patient interested in additional calls from an ambulatory ? No            Sabrina Tidwell RN    10/7/2024, 14:31 EDT

## 2024-10-07 NOTE — LETTER
October 7, 2024     JEAN-CLAUDE Lindsey  [address]  [address]  [place] [id number]    Patient: Carolyn Snowden   YOB: 1950   Date of Visit: 10/7/2024       Dear JEAN-CLAUDE Lindsey    Carolyn Snowden was in my office today. Below is a copy of my note.    If you have questions, please do not hesitate to call me. I look forward to following Carolyn along with you.         Sincerely,        JEAN-CLAUDE Wilson        CC: MD Emeterio Linares MD    Subjective:     Patient ID: Carolyn Snowden is a 74 y.o. female.    CC:   Chief Complaint   Patient presents with   • Alzheimer's Disease   • Gait Problem       HPI:   History of Present Illness  This is a 74-year-old female presenting for a 7-month neurology follow-up on short-term memory difficulties.    She has mild Alzheimer's disease and has been experiencing symptoms since 2020. Her memory issues persist, with her family noting a slight worsening. She occasionally forgets her location when in the hospital but does not experience this issue at home. She also forgets to pay bills or overpays them. Her family has to monitor bills and medications. She no longer drives and has not experienced any falls. She is on memantine XR 28 mg daily but has been intolerant to donepezil.    She is accompanied by her grandson.    She has reported some anxiety and depression, for which she was placed on sertraline 25 mg daily. However, she discontinued sertraline as she did not believe she was experiencing anxiety or depression. Per PCP records, she discontinued this. She also stopped taking clonazepam, which was prescribed for REM sleep behavior disorder, however, she believes she is confused and her daughter manages her medications. Per GEOVANY/PDMP she recently had clonazepam filled on 10/1/2024. She does not endorse any mood disturbances or agitation.    Family has suspected anxiety and depression, but she is not  interested in taking medication for this.    She has had some mild tremors in her hands, which have been monitored. Her balance is slightly off, but she believes it is manageable. She occasionally needs to hold onto something for support while walking and does not use a cane or walker. She plans to join an exercise class at her residence. Her grandson brought written notes from his mother, Mrs. Snowden's daughter, requesting therapy for her balance, noting her memory is worsened and she is no longer driving. Her daughter also has written down inquiries about her Alzheimer's and what to expect in each stage.    She was recently hospitalized at [facility] from 10/02/2024 to 10/05/2024 for generalized weakness, norovirus with nausea, vomiting, and diarrhea. She was treated supportively per ER and hospital records. The inpatient neurology stroke team did not have concerns of stroke. It was recommended she follow up with her primary care provider. She recently recovered from this severe stomach virus that caused significant discomfort. Her appetite has decreased since her illness, leading to weight loss. She compensates for her lack of eating by drinking plenty of fluids. She sleeps well without nightmares and feels rested upon waking.    SOCIAL HISTORY  She lives with her daughter.    Prior neurological workup and history:  She has noted symptoms since at least 2020 marked initially by forgetfulness . This has gradually worsened  over time. Additional symptoms have included impairments in short term memory . There have been associated  symptoms of depression . She denies  impairments in ADL's. Her family  manages her finances and she managers her medications with a pill planner and sometimes forgets but not often. She continues to drive. She is currently residing at home with her daughter. Her daughter is now having to care for her and requires [facility] to get Mrs. Snowden to and from appointments.      We  feel that her symptoms are most consistent with Alzheimer's disease with a possible vascular component. 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      Her highest level of education is 2 years of college. She lives with her daughter. She is able to pay bills, take her medications, and fix meals without difficulty. Daughter does have to monitor her medications as she often takes these incorrectly.     She did not tolerate the donepezil due to nightmares. Her nightmares have resolved.     Maternal grandmother had Alzheimer's around age 93 prior to passing away. Her  passed away in 2012 in his sleep. REM sleep disorder 15+ years. Has been on clonazepam 1.5mg at night to treat this-sleep specialist Dr. Emeterio Green. 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 Unknown cause. She can taste. No tremors. No shuffling. No family history of parkinson's disease. 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 Completed 1.5 years of college. Right hand dominant. Challenges with learning new things.     MRI of the brain with and without contrast completed on 11/4/2021 and imaging as well as radiology report reviewed previously in clinic and she does have some mild to moderate chronic small vessel ischemic changes with no acute intracranial abnormalities, mild atrophy of the brain consistent with age.  Compared to MRI of the brain with and without contrast from March 5, 2018 there are no significant changes.  She has had hemoglobin A1c 5.5% with PCP, ferritin 730, CBC overall normal, iron profile normal, lipid panel with total cholesterol 248, HDL 78 and  and now on low-dose Lipitor, CMP with creatinine 1.04 and otherwise normal.      She has also had vitamin B12 checked and it was 359 and folic acid 20 with methylmalonic acid normal and TSH and  free T4 normal.       She has completed cognitive therapy with one of our speech and language pathologist and her RBANS score was a 361 showing moderate to severe impairments in cognition, communication, immediate and delayed memory as well as language.  Her daughter notices trouble with focus, concentration and following more complex directions.       Daughter now pays bills and manages medications.     Alzheimer's with symptoms present since 2020.      Short term memory has worsened.     She only drives to [employer] in her home town, but does not drive elsewhere anymore.     Her other daughter passed away on 04/07/2023 from cancer.      Has previously refused to complete SLP for cognitive rehab.    The following portions of the patient's history were reviewed and updated as appropriate: allergies, current medications, past family history, past medical history, past social history, past surgical history, and problem list.    Past Medical History:   Diagnosis Date   • Acute angina    • Anxiety    • Hyperlipidemia    • Hypertension    • NSTEMI (non-ST elevated myocardial infarction)        Past Surgical History:   Procedure Laterality Date   • CYST REMOVAL     • TUBAL ABDOMINAL LIGATION         Social History     Socioeconomic History   • Marital status:    Tobacco Use   • Smoking status: Never   • Smokeless tobacco: Never   Vaping Use   • Vaping status: Never Used   Substance and Sexual Activity   • Alcohol use: Yes     Comment: occasional   • Drug use: Never   • Sexual activity: Not Currently       Family History   Problem Relation Age of Onset   • Liver disease Father    • Breast cancer Daughter 52   • Dementia Maternal Grandmother    • Liver disease Other    • Hypertension Other    • Heart disease Other    • Stroke Other    • Heart attack Other    • Ovarian cancer Neg Hx           Current Outpatient Medications:   •  atorvastatin (LIPITOR) 10 MG tablet, Take 1 tablet by mouth Daily., Disp: 90 tablet, Rfl:  "4  •  clonazePAM (KlonoPIN) 1 MG tablet, Take 1 tablet by mouth 2 (Two) Times a Day As Needed., Disp: , Rfl:   •  meclizine (ANTIVERT) 25 MG tablet, Take 0.5-1 tablet PO three dimes daily as needed for dizziness, Disp: 20 tablet, Rfl: 1  •  memantine (NAMENDA XR) 28 MG capsule sustained-release 24 hr extended release capsule, Take 1 capsule by mouth Daily., Disp: 90 capsule, Rfl: 3  •  NIFEdipine XL (PROCARDIA XL) 30 MG 24 hr tablet, TAKE 1 TABLET BY MOUTH EVERY DAY, Disp: 90 tablet, Rfl: 0     Review of Systems   Musculoskeletal:  Positive for gait problem.   Psychiatric/Behavioral:  Positive for confusion and decreased concentration.    All other systems reviewed and are negative.       Objective:  /80   Pulse 77   Ht 152.4 cm (60\")   Wt 57.6 kg (127 lb)   SpO2 100%   BMI 24.80 kg/m²     Neurological Exam  Mental Status  Alert. Oriented only to person, place and time. At 5 minutes (0/5) Able to copy figure. Clock drawing is abnormal. Speech is normal. Language is fluent with no aphasia. Unable to perform serial calculations. Fund of knowledge is abnormal.    Cranial Nerves  CN II: Visual acuity is normal. Visual fields full to confrontation.  CN III, IV, VI: Extraocular movements intact bilaterally. Normal lids and orbits bilaterally. Pupils equal round and reactive to light bilaterally.  CN V: Facial sensation is normal.  CN VII: Full and symmetric facial movement.  CN IX, X: Palate elevates symmetrically. Normal gag reflex.  CN XI: Shoulder shrug strength is normal.  CN XII: Tongue midline without atrophy or fasciculations.    Motor  Normal muscle bulk throughout. No fasciculations present. Normal muscle tone. The following abnormal movements were seen: Minimal BUE fine kinetic hand tremors noted.   Strength is 5/5 throughout all four extremities.    Reflexes  Deep tendon reflexes are 2+ and symmetric in all four extremities.    Coordination    Finger-to-nose, rapid alternating movements and " heel-to-shin normal bilaterally without dysmetria.    Gait  Casual gait: Normal stance. Reduced stride length. Antalgic gait. Slow with turns, guarded, no assistive device, no ataxia. Normal right arm swing. Normal left arm swing. Romberg is absent. Abnormal pull test. Able to rise from chair without using arms.    Physical Exam  Constitutional:       Appearance: Normal appearance.   Eyes:      General: Lids are normal.      Extraocular Movements: Extraocular movements intact.      Pupils: Pupils are equal, round, and reactive to light.   Neurological:      Mental Status: She is alert.      Motor: Motor strength is normal.     Coordination: Coordination is intact. Romberg sign negative.      Deep Tendon Reflexes: Reflexes are normal and symmetric.   Psychiatric:         Mood and Affect: Affect is blunt.         Speech: Speech normal.         Behavior: Behavior is slowed.         Thought Content: Thought content is delusional.         Cognition and Memory: She exhibits impaired recent memory.         Judgment: Judgment is inappropriate (reported by family).       Results:  Results  Imaging  CT scan of the head showed no acute abnormalities 10/2024 in hospital.    Testing  [place] cognitive assessment score is 19 out of 30, 0 out of 5 for word recall.  Prior MOCA 17/30.    Assessment/Plan:     Diagnoses and all orders for this visit:    1. Gait abnormality (Primary)  -     Ambulatory Referral to Home Health    2. Mild late onset Alzheimer's dementia without behavioral disturbance, psychotic disturbance, mood disturbance, or anxiety  Comments:  progressively worsening  Orders:  -     memantine (NAMENDA XR) 28 MG capsule sustained-release 24 hr extended release capsule; Take 1 capsule by mouth Daily.  Dispense: 90 capsule; Refill: 3  -     Ambulatory Referral to Home Health    3. Anxiety and depression  Comments:  denies, declines treatment    4. Tremor of both hands  Comments:  minimal, continue to monitor            Assessment & Plan  1. Mild to Moderate Alzheimer's Disease.  She has been experiencing worsening short-term memory difficulties. Her Erik Cognitive Assessment score is 19 out of 30, with 0 out of 5 for word recall, indicating mild Alzheimer's disease. She is currently on memantine XR 28 mg daily and has been intolerant to donepezil. Considering her recent hospitalization for norovirus, it is advised not to retry any medication in the same drug class as donepezil due to potential stomach upset. Once her stomach issues are resolved, a trial of rivastigmine patch or galantamine may be considered. She is no longer driving and should not drive. Home health services have been ordered to assist with strengthening and balance. Additional resources and information on Alzheimer's disease have been provided to her grandson. Cognitive testing has been relatively stable, but family has noted some decline and more assistance needed, concerning for some progression towards moderate Alzheimer's.     2. Anxiety and Depression.  She had previously been prescribed sertraline 25 mg daily but discontinued it due to side effects and personal refusal. Anxiety and depression can worsen memory, and her family felt she was experiencing these symptoms. Currently, she is not on any medication for anxiety or depression. She declines treatment.    3. Mild Tremors.  She has mild tremors in her hands, which have been monitored. No new treatment or changes were discussed during this visit.    4. Recent Norovirus Infection.  She was recently hospitalized from 10/2/2024 to 10/5/2024 for generalized weakness, nausea, vomiting, and diarrhea due to norovirus. She was treated supportively, and a CT scan of her head showed no acute abnormalities. She is advised to continue drinking plenty of fluids and eating bland foods to gradually return to her regular diet.    5. REM Sleep Behavior Disorder.  She had been on clonazepam for REM sleep behavior  disorder-continue with Psychiatry.    Follow-up  Return in Spring 2025 for reevaluation or sooner if needed.    Reviewed medications, potential side effects and signs and symptoms to report. Discussed risk versus benefits of treatment plan with patient and/or family-including medications, labs and radiology that may be ordered. Addressed questions and concerns during visit. Patient and/or family verbalized understanding and agree with plan.    Printed After Visit Summary and Provided to Grandson today with Additional Alzheimer's caregiver guide:    [email or web address] FOR STAGES OF ALZHEIMER'S     MILD TO MODERATE STAGES. COGNITIVE TESTING TODAY IN THE MILD RANGE.    NO DRIVING.     CONSIDER AFTER HER STOMACH IS ALL BETTER FROM THE STOMACH VIRUS THAT WE COULD TRY RIVASTIGMINE PATCH OR GALANTAMINE (SAME DRUG CLASS AS DONEPEZIL WHICH SHE DID NOT TOLERATE BEFORE) IF YOU WANT TO TRY.    CONTINUE MEMANTINE XR 28MG ONCE A DAY WHICH IS THE HIGHEST DOSE FOR MEMORY MAINTENANCE.     ANABELLA MG, AN RN IN OUR CLINIC CAN PROVIDE ADDITIONAL SUPPORT AND ANSWERS.    During this visit the following were done:  Labs Reviewed [x]    Labs Ordered []    Radiology Reports Reviewed [x]    Radiology Ordered []    PCP Records Reviewed []    Referring Provider Records Reviewed []    ER Records Reviewed [x]    Hospital Records Reviewed [x]    History Obtained From Family [x]    Radiology Images Reviewed []    Other Reviewed []    Records Requested []      10/07/24   11:07 EDT    Patient or patient representative verbalized consent for the use of Ambient Listening during the visit with  JEAN-CLAUDE Wilson for chart documentation. 10/7/2024  12:43 EDT    Note to patient: The 21st Century Cures Act makes medical notes like these available to patients in the interest of transparency. However, be advised this is a medical document. It is intended as peer to peer communication. It is written in medical language and may contain abbreviations or  verbiage that are unfamiliar. It may appear blunt or direct. Medical documents are intended to carry relevant information, facts as evident, and the clinical opinion of the provider.

## 2024-10-07 NOTE — PROGRESS NOTES
Subjective:     Patient ID: Carolyn Snowden is a 74 y.o. female.    CC:   Chief Complaint   Patient presents with    Alzheimer's Disease    Gait Problem       HPI:   History of Present Illness  This is a 74-year-old female presenting for a 7-month neurology follow-up on short-term memory difficulties.    She has mild Alzheimer's disease and has been experiencing symptoms since 2020. Her memory issues persist, with her family noting a slight worsening. She occasionally forgets her location when in the hospital but does not experience this issue at home. She also forgets to pay bills or overpays them. Her family has to monitor bills and medications. She no longer drives and has not experienced any falls. She is on memantine XR 28 mg daily but has been intolerant to donepezil.    She is accompanied by her grandson.    She has reported some anxiety and depression, for which she was placed on sertraline 25 mg daily. However, she discontinued sertraline as she did not believe she was experiencing anxiety or depression. Per PCP records, she discontinued this. She also stopped taking clonazepam, which was prescribed for REM sleep behavior disorder, however, she believes she is confused and her daughter manages her medications. Per GEOVANY/PDMP she recently had clonazepam filled on 10/1/2024. She does not endorse any mood disturbances or agitation.    Family has suspected anxiety and depression, but she is not interested in taking medication for this.    She has had some mild tremors in her hands, which have been monitored. Her balance is slightly off, but she believes it is manageable. She occasionally needs to hold onto something for support while walking and does not use a cane or walker. She plans to join an exercise class at her residence. Her grandson brought written notes from his mother, Mrs. Snowden's daughter, requesting therapy for her balance, noting her memory is worsened and she is no longer driving. Her  daughter also has written down inquiries about her Alzheimer's and what to expect in each stage.    She was recently hospitalized at [facility] from 10/02/2024 to 10/05/2024 for generalized weakness, norovirus with nausea, vomiting, and diarrhea. She was treated supportively per ER and hospital records. The inpatient neurology stroke team did not have concerns of stroke. It was recommended she follow up with her primary care provider. She recently recovered from this severe stomach virus that caused significant discomfort. Her appetite has decreased since her illness, leading to weight loss. She compensates for her lack of eating by drinking plenty of fluids. She sleeps well without nightmares and feels rested upon waking.    SOCIAL HISTORY  She lives with her daughter.    Prior neurological workup and history:  She has noted symptoms since at least 2020 marked initially by forgetfulness . This has gradually worsened  over time. Additional symptoms have included impairments in short term memory . There have been associated  symptoms of depression . She denies  impairments in ADL's. Her family  manages her finances and she managers her medications with a pill planner and sometimes forgets but not often. She continues to drive. She is currently residing at home with her daughter. Her daughter is now having to care for her and requires [facility] to get Mrs. Snowden to and from appointments.      We feel that her symptoms are most consistent with Alzheimer's disease with a possible vascular component. 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      Her highest level of education is 2 years of college. She lives with her daughter. She is able to pay bills, take her medications, and fix meals without difficulty. Daughter does have to monitor her medications as she  often takes these incorrectly.     She did not tolerate the donepezil due to nightmares. Her nightmares have resolved.     Maternal grandmother had Alzheimer's around age 93 prior to passing away. Her  passed away in 2012 in his sleep. REM sleep disorder 15+ years. Has been on clonazepam 1.5mg at night to treat this-sleep specialist Dr. Emeterio Green. 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 Unknown cause. She can taste. No tremors. No shuffling. No family history of parkinson's disease. 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 Completed 1.5 years of college. Right hand dominant. Challenges with learning new things.     MRI of the brain with and without contrast completed on 11/4/2021 and imaging as well as radiology report reviewed previously in clinic and she does have some mild to moderate chronic small vessel ischemic changes with no acute intracranial abnormalities, mild atrophy of the brain consistent with age.  Compared to MRI of the brain with and without contrast from March 5, 2018 there are no significant changes.  She has had hemoglobin A1c 5.5% with PCP, ferritin 730, CBC overall normal, iron profile normal, lipid panel with total cholesterol 248, HDL 78 and  and now on low-dose Lipitor, CMP with creatinine 1.04 and otherwise normal.      She has also had vitamin B12 checked and it was 359 and folic acid 20 with methylmalonic acid normal and TSH and free T4 normal.       She has completed cognitive therapy with one of our speech and language pathologist and her RBANS score was a 361 showing moderate to severe impairments in cognition, communication, immediate and delayed memory as well as language.  Her daughter notices trouble with focus, concentration and following more complex directions.       Daughter now pays bills and manages medications.     Alzheimer's with symptoms present since 2020.      Short term memory has worsened.     She only drives to [place]  in her home town, but does not drive elsewhere anymore.     Her other daughter passed away on 04/07/2023 from cancer.      Has previously refused to complete SLP for cognitive rehab.    The following portions of the patient's history were reviewed and updated as appropriate: allergies, current medications, past family history, past medical history, past social history, past surgical history, and problem list.    Past Medical History:   Diagnosis Date    Acute angina     Anxiety     Hyperlipidemia     Hypertension     NSTEMI (non-ST elevated myocardial infarction)        Past Surgical History:   Procedure Laterality Date    CYST REMOVAL      TUBAL ABDOMINAL LIGATION         Social History     Socioeconomic History    Marital status:    Tobacco Use    Smoking status: Never    Smokeless tobacco: Never   Vaping Use    Vaping status: Never Used   Substance and Sexual Activity    Alcohol use: Yes     Comment: occasional    Drug use: Never    Sexual activity: Not Currently       Family History   Problem Relation Age of Onset    Liver disease Father     Breast cancer Daughter 52    Dementia Maternal Grandmother     Liver disease Other     Hypertension Other     Heart disease Other     Stroke Other     Heart attack Other     Ovarian cancer Neg Hx           Current Outpatient Medications:     atorvastatin (LIPITOR) 10 MG tablet, Take 1 tablet by mouth Daily., Disp: 90 tablet, Rfl: 4    clonazePAM (KlonoPIN) 1 MG tablet, Take 1 tablet by mouth 2 (Two) Times a Day As Needed., Disp: , Rfl:     meclizine (ANTIVERT) 25 MG tablet, Take 0.5-1 tablet PO three dimes daily as needed for dizziness, Disp: 20 tablet, Rfl: 1    memantine (NAMENDA XR) 28 MG capsule sustained-release 24 hr extended release capsule, Take 1 capsule by mouth Daily., Disp: 90 capsule, Rfl: 3    NIFEdipine XL (PROCARDIA XL) 30 MG 24 hr tablet, TAKE 1 TABLET BY MOUTH EVERY DAY, Disp: 90 tablet, Rfl: 0     Review of Systems   Musculoskeletal:  Positive for  "gait problem.   Psychiatric/Behavioral:  Positive for confusion and decreased concentration.    All other systems reviewed and are negative.       Objective:  /80   Pulse 77   Ht 152.4 cm (60\")   Wt 57.6 kg (127 lb)   SpO2 100%   BMI 24.80 kg/m²     Neurological Exam  Mental Status  Alert. Oriented only to person, place and time. At 5 minutes (0/5) Able to copy figure. Clock drawing is abnormal. Speech is normal. Language is fluent with no aphasia. Unable to perform serial calculations. Fund of knowledge is abnormal.    Cranial Nerves  CN II: Visual acuity is normal. Visual fields full to confrontation.  CN III, IV, VI: Extraocular movements intact bilaterally. Normal lids and orbits bilaterally. Pupils equal round and reactive to light bilaterally.  CN V: Facial sensation is normal.  CN VII: Full and symmetric facial movement.  CN IX, X: Palate elevates symmetrically. Normal gag reflex.  CN XI: Shoulder shrug strength is normal.  CN XII: Tongue midline without atrophy or fasciculations.    Motor  Normal muscle bulk throughout. No fasciculations present. Normal muscle tone. The following abnormal movements were seen: Minimal BUE fine kinetic hand tremors noted.   Strength is 5/5 throughout all four extremities.    Reflexes  Deep tendon reflexes are 2+ and symmetric in all four extremities.    Coordination    Finger-to-nose, rapid alternating movements and heel-to-shin normal bilaterally without dysmetria.    Gait  Casual gait: Normal stance. Reduced stride length. Antalgic gait. Slow with turns, guarded, no assistive device, no ataxia. Normal right arm swing. Normal left arm swing. Romberg is absent. Abnormal pull test. Able to rise from chair without using arms.    Physical Exam  Constitutional:       Appearance: Normal appearance.   Eyes:      General: Lids are normal.      Extraocular Movements: Extraocular movements intact.      Pupils: Pupils are equal, round, and reactive to light.   Neurological: "      Mental Status: She is alert.      Motor: Motor strength is normal.     Coordination: Coordination is intact. Romberg sign negative.      Deep Tendon Reflexes: Reflexes are normal and symmetric.   Psychiatric:         Mood and Affect: Affect is blunt.         Speech: Speech normal.         Behavior: Behavior is slowed.         Thought Content: Thought content is delusional.         Cognition and Memory: She exhibits impaired recent memory.         Judgment: Judgment is inappropriate (reported by family).       Results:  Results  Imaging  CT scan of the head showed no acute abnormalities 10/2024 in hospital.    Testing  Wenden cognitive assessment score is 19 out of 30, 0 out of 5 for word recall.  Prior MOCA 17/30.    Assessment/Plan:     Diagnoses and all orders for this visit:    1. Gait abnormality (Primary)  -     Ambulatory Referral to Home Health    2. Mild late onset Alzheimer's dementia without behavioral disturbance, psychotic disturbance, mood disturbance, or anxiety  Comments:  progressively worsening  Orders:  -     memantine (NAMENDA XR) 28 MG capsule sustained-release 24 hr extended release capsule; Take 1 capsule by mouth Daily.  Dispense: 90 capsule; Refill: 3  -     Ambulatory Referral to Home Health    3. Anxiety and depression  Comments:  denies, declines treatment    4. Tremor of both hands  Comments:  minimal, continue to monitor           Assessment & Plan  1. Mild to Moderate Alzheimer's Disease.  She has been experiencing worsening short-term memory difficulties. Her Erik Cognitive Assessment score is 19 out of 30, with 0 out of 5 for word recall, indicating mild Alzheimer's disease. She is currently on memantine XR 28 mg daily and has been intolerant to donepezil. Considering her recent hospitalization for norovirus, it is advised not to retry any medication in the same drug class as donepezil due to potential stomach upset. Once her stomach issues are resolved, a trial of  rivastigmine patch or galantamine may be considered. She is no longer driving and should not drive. Home health services have been ordered to assist with strengthening and balance. Additional resources and information on Alzheimer's disease have been provided to her grandson. Cognitive testing has been relatively stable, but family has noted some decline and more assistance needed, concerning for some progression towards moderate Alzheimer's.     2. Anxiety and Depression.  She had previously been prescribed sertraline 25 mg daily but discontinued it due to side effects and personal refusal. Anxiety and depression can worsen memory, and her family felt she was experiencing these symptoms. Currently, she is not on any medication for anxiety or depression. She declines treatment.    3. Mild Tremors.  She has mild tremors in her hands, which have been monitored. No new treatment or changes were discussed during this visit.    4. Recent Norovirus Infection.  She was recently hospitalized from 10/2/2024 to 10/5/2024 for generalized weakness, nausea, vomiting, and diarrhea due to norovirus. She was treated supportively, and a CT scan of her head showed no acute abnormalities. She is advised to continue drinking plenty of fluids and eating bland foods to gradually return to her regular diet.    5. REM Sleep Behavior Disorder.  She had been on clonazepam for REM sleep behavior disorder-continue with Psychiatry.    Follow-up  Return in Spring 2025 for reevaluation or sooner if needed.    Reviewed medications, potential side effects and signs and symptoms to report. Discussed risk versus benefits of treatment plan with patient and/or family-including medications, labs and radiology that may be ordered. Addressed questions and concerns during visit. Patient and/or family verbalized understanding and agree with plan.    Printed After Visit Summary and Provided to Grandson today with Additional Alzheimer's caregiver  guide:    [email or web address] FOR STAGES OF ALZHEIMER'S     MILD TO MODERATE STAGES. COGNITIVE TESTING TODAY IN THE MILD RANGE.    NO DRIVING.     CONSIDER AFTER HER STOMACH IS ALL BETTER FROM THE STOMACH VIRUS THAT WE COULD TRY RIVASTIGMINE PATCH OR GALANTAMINE (SAME DRUG CLASS AS DONEPEZIL WHICH SHE DID NOT TOLERATE BEFORE) IF YOU WANT TO TRY.    CONTINUE MEMANTINE XR 28MG ONCE A DAY WHICH IS THE HIGHEST DOSE FOR MEMORY MAINTENANCE.     ANABELLA MG, AN RN IN OUR CLINIC CAN PROVIDE ADDITIONAL SUPPORT AND ANSWERS.    During this visit the following were done:  Labs Reviewed [x]    Labs Ordered []    Radiology Reports Reviewed [x]    Radiology Ordered []    PCP Records Reviewed []    Referring Provider Records Reviewed []    ER Records Reviewed [x]    Hospital Records Reviewed [x]    History Obtained From Family [x]    Radiology Images Reviewed []    Other Reviewed []    Records Requested []      10/07/24   11:07 EDT    Patient or patient representative verbalized consent for the use of Ambient Listening during the visit with  JEAN-CLAUDE Wilson for chart documentation. 10/7/2024  12:43 EDT    Note to patient: The 21st Century Cures Act makes medical notes like these available to patients in the interest of transparency. However, be advised this is a medical document. It is intended as peer to peer communication. It is written in medical language and may contain abbreviations or verbiage that are unfamiliar. It may appear blunt or direct. Medical documents are intended to carry relevant information, facts as evident, and the clinical opinion of the provider.

## 2024-10-07 NOTE — PATIENT INSTRUCTIONS
[email or web address] FOR STAGES OF ALZHEIMER'S     MILD TO MODERATE STAGES. COGNITIVE TESTING TODAY IN THE MILD RANGE.    NO DRIVING.     CONSIDER AFTER HER STOMACH IS ALL BETTER FROM THE STOMACH VIRUS THAT WE COULD TRY RIVASTIGMINE PATCH OR GALANTAMINE (SAME DRUG CLASS AS DONEPEZIL WHICH SHE DID NOT TOLERATE BEFORE) IF YOU WANT TO TRY.    CONTINUE MEMANTINE XR 28MG ONCE A DAY WHICH IS THE HIGHEST DOSE FOR MEMORY MAINTENANCE.     ANABELLA MG, AN RN IN OUR CLINIC CAN PROVIDE ADDITIONAL SUPPORT AND ANSWERS.

## 2024-10-08 LAB
BACTERIA SPEC AEROBE CULT: NORMAL
BACTERIA SPEC AEROBE CULT: NORMAL
QT INTERVAL: 372 MS
QTC INTERVAL: 477 MS

## 2024-10-10 ENCOUNTER — TELEPHONE (OUTPATIENT)
Dept: NEUROLOGY | Facility: CLINIC | Age: 74
End: 2024-10-10
Payer: MEDICARE

## 2024-10-10 NOTE — TELEPHONE ENCOUNTER
Called to check in and see if had any questions from last appointment, there was no answer. Left call back number  
yellow

## 2024-10-11 ENCOUNTER — OFFICE VISIT (OUTPATIENT)
Dept: INTERNAL MEDICINE | Facility: CLINIC | Age: 74
End: 2024-10-11
Payer: MEDICARE

## 2024-10-11 ENCOUNTER — TELEPHONE (OUTPATIENT)
Dept: NEUROLOGY | Facility: CLINIC | Age: 74
End: 2024-10-11
Payer: MEDICARE

## 2024-10-11 ENCOUNTER — LAB (OUTPATIENT)
Dept: INTERNAL MEDICINE | Facility: CLINIC | Age: 74
End: 2024-10-11
Payer: MEDICARE

## 2024-10-11 VITALS
HEIGHT: 61 IN | BODY MASS INDEX: 23.98 KG/M2 | HEART RATE: 71 BPM | OXYGEN SATURATION: 99 % | WEIGHT: 127 LBS | TEMPERATURE: 97.6 F | SYSTOLIC BLOOD PRESSURE: 122 MMHG | DIASTOLIC BLOOD PRESSURE: 68 MMHG

## 2024-10-11 DIAGNOSIS — G30.8 MODERATE ALZHEIMER'S DEMENTIA OF OTHER ONSET WITH ANXIETY: ICD-10-CM

## 2024-10-11 DIAGNOSIS — F02.B4 MODERATE ALZHEIMER'S DEMENTIA OF OTHER ONSET WITH ANXIETY: ICD-10-CM

## 2024-10-11 DIAGNOSIS — E44.0 MODERATE MALNUTRITION: ICD-10-CM

## 2024-10-11 DIAGNOSIS — E87.6 HYPOKALEMIA: ICD-10-CM

## 2024-10-11 DIAGNOSIS — I10 ESSENTIAL HYPERTENSION: ICD-10-CM

## 2024-10-11 DIAGNOSIS — K21.9 GASTROESOPHAGEAL REFLUX DISEASE WITHOUT ESOPHAGITIS: ICD-10-CM

## 2024-10-11 DIAGNOSIS — A08.11 NOROVIRUS: ICD-10-CM

## 2024-10-11 DIAGNOSIS — I10 ESSENTIAL HYPERTENSION: Primary | ICD-10-CM

## 2024-10-11 DIAGNOSIS — Z09 HOSPITAL DISCHARGE FOLLOW-UP: Primary | ICD-10-CM

## 2024-10-11 DIAGNOSIS — R79.89 LOW SERUM CHLORIDE: ICD-10-CM

## 2024-10-11 DIAGNOSIS — E83.51 HYPOCALCEMIA: ICD-10-CM

## 2024-10-11 PROCEDURE — 36415 COLL VENOUS BLD VENIPUNCTURE: CPT | Performed by: NURSE PRACTITIONER

## 2024-10-11 PROCEDURE — 80048 BASIC METABOLIC PNL TOTAL CA: CPT | Performed by: NURSE PRACTITIONER

## 2024-10-11 NOTE — PROGRESS NOTES
"Chief Complaint   Patient presents with    Follow-up     10/2/2026 had Norovirus.       HPI  Carolyn Snowden is a 74 y.o. female presents for a hospital follow-up.  Pt was admitted to [facility] 10/2-10/5/24 due to Norovirus after presenting to the ED with N/V/D.  She is accompanied by her daughter.   Her daughter states several family members had the virus.  Pt states that she is feeling much better.  She denies any further N/V/D.  She still drinks her daily protein shake.    The following portions of the patient's history were reviewed and updated as appropriate: allergies, current medications, past family history, past medical history, past social history, past surgical history, and problem list.    Subjective  Review of Systems   Constitutional:  Negative for activity change, appetite change and fatigue.   HENT:  Negative for congestion.    Respiratory:  Negative for cough and shortness of breath.    Cardiovascular:  Negative for chest pain and leg swelling.   Gastrointestinal:  Negative for abdominal pain.   Neurological:  Positive for memory problem. Negative for dizziness, weakness and confusion.   Psychiatric/Behavioral:  Negative for behavioral problems and decreased concentration.        Objective  Visit Vitals  /68 (BP Location: Left arm, Patient Position: Sitting)   Pulse 71   Temp 97.6 °F (36.4 °C)   Ht 154.9 cm (61\")   Wt 57.6 kg (127 lb)   SpO2 99%   BMI 24.00 kg/m²        Physical Exam  Vitals and nursing note reviewed.   HENT:      Head: Normocephalic.   Eyes:      Pupils: Pupils are equal, round, and reactive to light.   Cardiovascular:      Rate and Rhythm: Normal rate and regular rhythm.      Pulses: Normal pulses.      Heart sounds: Normal heart sounds.   Pulmonary:      Effort: Pulmonary effort is normal. No respiratory distress.      Breath sounds: Normal breath sounds.   Skin:     General: Skin is warm and dry.      Capillary Refill: Capillary refill takes less than 2 seconds. "   Neurological:      General: No focal deficit present.      Mental Status: She is alert. Mental status is at baseline.      Gait: Gait is intact.   Psychiatric:         Attention and Perception: Attention normal.         Mood and Affect: Mood normal.         Behavior: Behavior normal.          Procedures     Assessment and Plan  Diagnoses and all orders for this visit:    1. Hospital discharge follow-up (Primary)    2. Norovirus    3. Hypokalemia  -     Basic metabolic panel; Future  -     Basic metabolic panel    4. Hypocalcemia  -     Basic metabolic panel; Future  -     Basic metabolic panel    5. Low serum chloride    6. Moderate Alzheimer's dementia of other onset with anxiety    7. Essential hypertension    8. Moderate malnutrition    9. Gastroesophageal reflux disease without esophagitis    D/c summary reviewed  Pt has been contacted by the TCM nurse  Recheck labs today  Cont daily Boost  Last neuro note reviewed, mild-mod alzheimers  HTN stable without Nifedipine, daughter instructed to monitor at home  GERD stable wtihotu treatment    Return for Next scheduled follow up.        JEAN-CLAUDE Lindsey

## 2024-10-12 LAB
ANION GAP SERPL CALCULATED.3IONS-SCNC: 11 MMOL/L (ref 5–15)
BUN SERPL-MCNC: 11 MG/DL (ref 8–23)
BUN/CREAT SERPL: 11.6 (ref 7–25)
CALCIUM SPEC-SCNC: 9.3 MG/DL (ref 8.6–10.5)
CHLORIDE SERPL-SCNC: 105 MMOL/L (ref 98–107)
CO2 SERPL-SCNC: 28 MMOL/L (ref 22–29)
CREAT SERPL-MCNC: 0.95 MG/DL (ref 0.57–1)
EGFRCR SERPLBLD CKD-EPI 2021: 62.2 ML/MIN/1.73
GLUCOSE SERPL-MCNC: 91 MG/DL (ref 65–99)
POTASSIUM SERPL-SCNC: 3.6 MMOL/L (ref 3.5–5.2)
SODIUM SERPL-SCNC: 144 MMOL/L (ref 136–145)

## 2024-10-14 ENCOUNTER — READMISSION MANAGEMENT (OUTPATIENT)
Dept: CALL CENTER | Facility: HOSPITAL | Age: 74
End: 2024-10-14
Payer: MEDICARE

## 2024-10-14 ENCOUNTER — TELEPHONE (OUTPATIENT)
Dept: INTERNAL MEDICINE | Facility: CLINIC | Age: 74
End: 2024-10-14
Payer: MEDICARE

## 2024-10-14 NOTE — OUTREACH NOTE
Medical Week 2 Survey      Flowsheet Row Responses   [facility] patient discharged from? [place]   Does the patient have one of the following disease processes/diagnoses(primary or secondary)? Other   Week 2 attempt successful? Yes   Call start time 1508   Discharge diagnosis Norovirus   Call end time 1509   Is patient permission given to speak with other caregiver? Yes   List who call center can speak with Dtr   Person spoke with today (if not patient) and relationship Dtr   Meds reviewed with patient/caregiver? Yes   Is the patient having any side effects they believe may be caused by any medication additions or changes? No   Does the patient have all medications ordered at discharge? Yes   Is the patient taking all medications as directed (includes completed medication regime)? Yes   Does the patient have a primary care provider?  Yes   Does the patient have an appointment with their PCP within 7 days of discharge? Yes   Has the patient kept scheduled appointments due by today? Yes   What is the Home health agency?  [facility]   Has home health visited the patient within 72 hours of discharge? Yes   Psychosocial issues? No   Did the patient receive a copy of their discharge instructions? Yes   Nursing interventions Reviewed instructions with patient   What is the patient's perception of their health status since discharge? Improving   Is the patient/caregiver able to teach back signs and symptoms related to disease process for when to call PCP? Yes   Is the patient/caregiver able to teach back signs and symptoms related to disease process for when to call 911? Yes   Is the patient/caregiver able to teach back the hierarchy of who to call/visit for symptoms/problems? PCP, Specialist, Home health nurse, Urgent Care, ED, 911 Yes   Week 2 Call Completed? Yes   Is the patient interested in additional calls from an ambulatory ? No   Wrap up additional comments Dtr reports patient doing  well, no concerns   Call end time [id number]            Edmunds T - Registered Nurse

## 2024-10-14 NOTE — TELEPHONE ENCOUNTER
LVM for pt to return call.     OK FOR HUB TO RELAY MESSAGE    ----- Message from Ezekiel Vigil sent at 10/13/2024  9:07 AM EDT -----  Let pt know that her labs are all normal

## 2024-10-15 ENCOUNTER — TELEPHONE (OUTPATIENT)
Dept: NEUROLOGY | Facility: CLINIC | Age: 74
End: 2024-10-15
Payer: MEDICARE

## 2024-10-23 ENCOUNTER — READMISSION MANAGEMENT (OUTPATIENT)
Dept: CALL CENTER | Facility: HOSPITAL | Age: 74
End: 2024-10-23
Payer: MEDICARE

## 2024-10-23 NOTE — OUTREACH NOTE
Medical Week 3 Survey      Flowsheet Row Responses   [facility] patient discharged from? [place]   Does the patient have one of the following disease processes/diagnoses(primary or secondary)? Other   Week 3 attempt successful? Yes   Call start time 1219   Call end time 1221   Discharge diagnosis Norovirus   Is patient permission given to speak with other caregiver? Yes   List who call center can speak with Magalys Ervin   Person spoke with today (if not patient) and relationship Magalys Ervin- daughter   Meds reviewed with patient/caregiver? Yes   Is the patient having any side effects they believe may be caused by any medication additions or changes? No   Does the patient have all medications ordered at discharge? Yes   Is the patient taking all medications as directed (includes completed medication regime)? Yes   Does the patient have a primary care provider?  Yes   Comments regarding PCP PCP appointment is tomorrow   Does the patient have an appointment with their PCP within 7 days of discharge? Greater than 7 days   What is preventing the patient from scheduling follow up appointments within 7 days of discharge? Earlier appointment not available   Nursing Interventions Verified appointment date/time/provider   Has the patient kept scheduled appointments due by today? Yes   What is the Home health agency?  [facility]   Has home health visited the patient within 72 hours of discharge? Yes   Psychosocial issues? No   Did the patient receive a copy of their discharge instructions? Yes   Nursing interventions Reviewed instructions with patient   What is the patient's perception of their health status since discharge? Improving   Is the patient/caregiver able to teach back signs and symptoms related to disease process for when to call PCP? Yes   Is the patient/caregiver able to teach back signs and symptoms related to disease process for when to call 911? Yes   Is the patient/caregiver able to teach  back the hierarchy of who to call/visit for symptoms/problems? PCP, Specialist, Home health nurse, Urgent Care, ED, 911 Yes   If the patient is a current smoker, are they able to teach back resources for cessation? Not a smoker   Week 3 Call Completed? Yes   Graduated Yes   Is the patient interested in additional calls from an ambulatory ? No   Would this patient benefit from a Referral to [facility] Social Work? No   Call end time 1221            Corrie Collado Nurse

## 2024-10-24 RX ORDER — SODIUM, POTASSIUM,MAG SULFATES 17.5-3.13G
SOLUTION, RECONSTITUTED, ORAL ORAL
Qty: 354 ML | Refills: 0 | Status: SHIPPED | OUTPATIENT
Start: 2024-10-24

## 2024-12-05 RX ORDER — NIFEDIPINE 30 MG/1
30 TABLET, EXTENDED RELEASE ORAL DAILY
Qty: 90 TABLET | Refills: 0 | Status: SHIPPED | OUTPATIENT
Start: 2024-12-05

## 2025-01-09 ENCOUNTER — TELEPHONE (OUTPATIENT)
Dept: INTERNAL MEDICINE | Facility: CLINIC | Age: 75
End: 2025-01-09

## 2025-01-09 NOTE — TELEPHONE ENCOUNTER
Caller: DYAN DIAZ     Relationship: [unfilled]     Best call back number: [phone]     What is your medical concern? PATIENT'S DAUGHTER  IS CALLING TO LET THE PROVIDER KNOW THATH HER MOTHER HAS NOT HAD A BOWEL MOVEMENT IN THE LAST 5 DAYS .      How long has this issue been going on? LAST 5 DAYS    Is your provider already aware of this issue? NO      Have you been treated for this issue? NO        PLEASE CALL DAUGHTER TO DISCUSS THIS CONCERN ASAP

## 2025-01-10 DIAGNOSIS — K59.09 CHRONIC CONSTIPATION: Primary | ICD-10-CM

## 2025-01-10 RX ORDER — LACTULOSE 10 G/15ML
20 SOLUTION ORAL 2 TIMES DAILY PRN
Qty: 200 ML | Refills: 0 | Status: SHIPPED | OUTPATIENT
Start: 2025-01-10

## 2025-03-07 ENCOUNTER — OFFICE VISIT (OUTPATIENT)
Dept: INTERNAL MEDICINE | Facility: CLINIC | Age: 75
End: 2025-03-07
Payer: MEDICARE

## 2025-03-07 VITALS
TEMPERATURE: 97.5 F | BODY MASS INDEX: 24.73 KG/M2 | HEIGHT: 61 IN | DIASTOLIC BLOOD PRESSURE: 82 MMHG | OXYGEN SATURATION: 99 % | WEIGHT: 131 LBS | SYSTOLIC BLOOD PRESSURE: 150 MMHG | HEART RATE: 80 BPM

## 2025-03-07 DIAGNOSIS — G30.8 MODERATE ALZHEIMER'S DEMENTIA OF OTHER ONSET WITH ANXIETY: ICD-10-CM

## 2025-03-07 DIAGNOSIS — T14.8XXA BLOOD BLISTER: Primary | ICD-10-CM

## 2025-03-07 DIAGNOSIS — I10 ESSENTIAL HYPERTENSION: ICD-10-CM

## 2025-03-07 DIAGNOSIS — F02.B4 MODERATE ALZHEIMER'S DEMENTIA OF OTHER ONSET WITH ANXIETY: ICD-10-CM

## 2025-03-07 RX ORDER — NIFEDIPINE 30 MG/1
30 TABLET, EXTENDED RELEASE ORAL DAILY
Qty: 90 TABLET | Refills: 3 | Status: SHIPPED | OUTPATIENT
Start: 2025-03-07

## 2025-03-07 NOTE — PROGRESS NOTES
"Chief Complaint   Patient presents with    Blister     Both arms and legs        HPI  Carolyn Snowden is a 74 y.o. female presents with multiple blisters on her left arm and left leg.  She is accompanied by her daughter.  She has a history of the blisters popping up on her skin.  They do not itch but she states she does have dry skin that she scratches at times.  Her BP is elevated today.  She states when she was admitted to [facility] in October for norovirus her BP was low so it was stopped.  She has not been checking her BP at home.  She denies any headaches or dizziness.    The following portions of the patient's history were reviewed and updated as appropriate: allergies, current medications, past family history, past medical history, past social history, past surgical history, and problem list.    Subjective  Review of Systems   Skin:  Positive for dry skin and skin lesions.       Objective  Visit Vitals  /82   Pulse 80   Temp 97.5 °F (36.4 °C)   Ht 154.9 cm (61\")   Wt 59.4 kg (131 lb)   SpO2 99%   BMI 24.75 kg/m²        Physical Exam  Vitals and nursing note reviewed.   HENT:      Head: Normocephalic.   Eyes:      Pupils: Pupils are equal, round, and reactive to light.   Cardiovascular:      Rate and Rhythm: Normal rate and regular rhythm.      Pulses: Normal pulses.      Heart sounds: Normal heart sounds.   Pulmonary:      Effort: Pulmonary effort is normal. No respiratory distress.      Breath sounds: Normal breath sounds.   Skin:     General: Skin is warm and dry.      Capillary Refill: Capillary refill takes less than 2 seconds.          Neurological:      General: No focal deficit present.      Mental Status: She is alert and oriented to person, place, and time.      Gait: Gait is intact.   Psychiatric:         Attention and Perception: Attention normal.         Mood and Affect: Mood normal.         Behavior: Behavior normal.          Procedures     Assessment and Plan  Diagnoses and all orders for " this visit:    1. Blood blister (Primary)  -     Ambulatory Referral to Dermatology    2. Essential hypertension  -     NIFEdipine XL (PROCARDIA XL) 30 MG 24 hr tablet; Take 1 tablet by mouth Daily.  Dispense: 90 tablet; Refill: 3    3. Moderate Alzheimer's dementia of other onset with anxiety      Refer to dermatology due to reoccurring blood blisters on skin  Skin tears on arms covered with non-adherent dressing  Instructed to continue to use thick emollient on dry skin  Restart Nifedipine for HTN  Alzheimers appears stable on Namenda    Return in about 2 months (around 5/7/2025) for htn.        Ezekiel Vigil, APRN

## 2025-04-21 ENCOUNTER — OFFICE VISIT (OUTPATIENT)
Dept: NEUROLOGY | Facility: CLINIC | Age: 75
End: 2025-04-21
Payer: MEDICARE

## 2025-04-21 VITALS
SYSTOLIC BLOOD PRESSURE: 128 MMHG | BODY MASS INDEX: 24.03 KG/M2 | HEIGHT: 61 IN | WEIGHT: 127.3 LBS | OXYGEN SATURATION: 100 % | DIASTOLIC BLOOD PRESSURE: 70 MMHG | HEART RATE: 69 BPM

## 2025-04-21 DIAGNOSIS — F02.B11 MODERATE LATE ONSET ALZHEIMER'S DEMENTIA WITH AGITATION: Primary | ICD-10-CM

## 2025-04-21 DIAGNOSIS — G30.1 MODERATE LATE ONSET ALZHEIMER'S DEMENTIA WITH AGITATION: Primary | ICD-10-CM

## 2025-04-21 PROBLEM — F02.A11 MILD LATE ONSET ALZHEIMER'S DEMENTIA WITH AGITATION: Status: ACTIVE | Noted: 2024-03-04

## 2025-04-21 PROCEDURE — 1159F MED LIST DOCD IN RCRD: CPT | Performed by: NURSE PRACTITIONER

## 2025-04-21 PROCEDURE — 1160F RVW MEDS BY RX/DR IN RCRD: CPT | Performed by: NURSE PRACTITIONER

## 2025-04-21 PROCEDURE — 3078F DIAST BP <80 MM HG: CPT | Performed by: NURSE PRACTITIONER

## 2025-04-21 PROCEDURE — 99214 OFFICE O/P EST MOD 30 MIN: CPT | Performed by: NURSE PRACTITIONER

## 2025-04-21 PROCEDURE — 3074F SYST BP LT 130 MM HG: CPT | Performed by: NURSE PRACTITIONER

## 2025-04-21 RX ORDER — ESCITALOPRAM OXALATE 5 MG/1
5 TABLET ORAL EVERY MORNING
Qty: 90 TABLET | Refills: 3 | Status: SHIPPED | OUTPATIENT
Start: 2025-04-21

## 2025-04-21 RX ORDER — MEMANTINE HYDROCHLORIDE 28 MG/1
28 CAPSULE, EXTENDED RELEASE ORAL DAILY
Qty: 90 CAPSULE | Refills: 3 | Status: SHIPPED | OUTPATIENT
Start: 2025-04-21

## 2025-04-21 NOTE — PROGRESS NOTES
"Subjective:     Patient ID: Carolyn Snowden is a 74 y.o. female.    CC:   Chief Complaint   Patient presents with    Alzheimer's Disease     HPI:   History of Present Illness  This is a 74-year-old female with mild and progressing Alzheimer's disease, presenting for a follow-up visit. Symptoms have been present since 2020, and she was last seen in October 2024. She is currently taking memantine 28 mg once daily capsules, as she was unable to adhere to a twice-daily dosing regimen. Her family assists with her care, including medication management and driving. She also has anxiety and depression but has previously declined treatment for these conditions. Additionally, she has REM sleep behavior disorder and takes clonazepam 1/2 tablet nightly long term for management. She is with her daughter during today's visit.    No significant changes in health status are reported since the last visit. Memantine 28 mg once daily is taken without any adverse effects. Dissatisfaction with memory is expressed, describing it as \"terrible.\" Despite this, independence in daily activities such as showering and dressing is maintained. Interest in trying Prevagen is noted. No falls have been experienced, and good balance and mobility are reported.    Her family notes increased agitation when reminded of certain things, attributing this symptom to Alzheimer's disease. Agitation has increased, particularly towards her grandson and daughter, who are her primary caregivers. Agitation during testing due to inability to recall information is reported. Her daughter expresses concern about the progression of Alzheimer's disease, noting frequent forgetfulness of conversations within minutes. Desire to drive is expressed, but family discourages this due to safety concerns. Misplacement of firearms on three occasions is mentioned, leading to increased vigilance from her family. Sertraline was prescribed in March 2024 but adherence to the " medication regimen was not maintained.    Her daughter inquires about what to expect with progression of memory.    Prior neurological workup and history:  She has noted symptoms since at least 2020 marked initially by forgetfulness . This has gradually worsened  over time. Additional symptoms have included impairments in short term memory . There have been associated  symptoms of depression . She denies  impairments in ADL's. Her family manages her finances and she managers her medications with a pill planner and sometimes forgets but not often. She continues to drive. She is currently residing at home with her daughter. Her daughter is now having to care for her and requires [facility] to get Mrs. Snowden to and from appointments.      We feel that her symptoms are most consistent with Alzheimer's disease with a possible vascular component. 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      Her highest level of education is 2 years of college. She lives with her daughter.      She did not tolerate the donepezil due to nightmares.      Maternal grandmother had Alzheimer's around age 93 prior to passing away. Her  passed away in 2012 in his sleep. REM sleep disorder 15+ years. Has been on clonazepam 1.5mg at night to treat this-sleep specialist Dr. Emeterio Green. 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 Unknown cause. She can taste. No tremors. No shuffling. No family history of parkinson's disease. 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 Completed 1.5 years of college. Right hand dominant. Challenges with learning new things.     MRI of the brain with and without contrast completed on 11/4/2021 and imaging as well as radiology report reviewed previously in clinic and she does have some mild to moderate chronic small vessel ischemic changes  with no acute intracranial abnormalities, mild atrophy of the brain consistent with age.  Compared to MRI of the brain with and without contrast from March 5, 2018 there are no significant changes.  She has had hemoglobin A1c 5.5% with PCP, ferritin 730, CBC overall normal, iron profile normal, lipid panel with total cholesterol 248, HDL 78 and  and now on low-dose Lipitor, CMP with creatinine 1.04 and otherwise normal.      She has also had vitamin B12 checked and it was 359 and folic acid 20 with methylmalonic acid normal and TSH and free T4 normal.    She has completed cognitive therapy with one of our speech and language pathologist and her RBANS score was a 361 showing moderate to severe impairments in cognition, communication, immediate and delayed memory as well as language.  Her daughter notices trouble with focus, concentration and following more complex directions.       Daughter now pays bills and manages medications.     Her other daughter passed away on 04/07/2023 from cancer.      Has previously refused to complete SLP for cognitive rehab.    Imaging  CT scan of the head showed no acute abnormalities 10/2024 in hospital.     Prior MOCA 17-19/30.    Family has suspected anxiety and depression, but she is not interested in taking medication for this. Prescribed sertraline in 3/2024 but was unwilling to take it.    The following portions of the patient's history were reviewed and updated as appropriate: allergies, current medications, past family history, past medical history, past social history, past surgical history, and problem list.    Past Medical History:   Diagnosis Date    Acute angina     Anxiety     Hyperlipidemia     Hypertension     NSTEMI (non-ST elevated myocardial infarction)        Past Surgical History:   Procedure Laterality Date    CYST REMOVAL      TUBAL ABDOMINAL LIGATION         Social History     Socioeconomic History    Marital status:    Tobacco Use    Smoking  "status: Never    Smokeless tobacco: Never   Vaping Use    Vaping status: Never Used   Substance and Sexual Activity    Alcohol use: Yes     Comment: occasional    Drug use: Never    Sexual activity: Not Currently       Family History   Problem Relation Age of Onset    Liver disease Father     Breast cancer Daughter 52    Dementia Maternal Grandmother     Liver disease Other     Hypertension Other     Heart disease Other     Stroke Other     Heart attack Other     Ovarian cancer Neg Hx           Current Outpatient Medications:     atorvastatin (LIPITOR) 10 MG tablet, Take 1 tablet by mouth Daily., Disp: 90 tablet, Rfl: 4    clonazePAM (KlonoPIN) 1 MG tablet, Take 1 tablet by mouth 2 (Two) Times a Day As Needed., Disp: , Rfl:     memantine (NAMENDA XR) 28 MG capsule sustained-release 24 hr extended release capsule, Take 1 capsule by mouth Daily., Disp: 90 capsule, Rfl: 3    NIFEdipine XL (PROCARDIA XL) 30 MG 24 hr tablet, Take 1 tablet by mouth Daily., Disp: 90 tablet, Rfl: 3    escitalopram (LEXAPRO) 5 MG tablet, Take 1 tablet by mouth Every Morning., Disp: 90 tablet, Rfl: 3    sodium-potassium-magnesium sulfates (Suprep Bowel Prep Kit) 17.5-3.13-1.6 GM/177ML solution oral solution, Take First Dose at 5 pm Take Second Dose at 10 pm, Nothing to eat or drink after midnight. May substitute for Golytely or Moviprep. Follow instructions provided by Office. Call [phone] with questions. (Patient not taking: Reported on 4/21/2025), Disp: 354 mL, Rfl: 0     Review of Systems   Psychiatric/Behavioral:  Positive for decreased concentration. The patient is nervous/anxious.    All other systems reviewed and are negative.       Objective:  /70   Pulse 69   Ht 154.9 cm (61\")   Wt 57.7 kg (127 lb 4.8 oz)   SpO2 100%   BMI 24.05 kg/m²     Neurological Exam  Mental Status  Alert. Oriented only to person, place and time. (0/5) Unable to copy figure. Clock drawing is abnormal. Speech is normal. Language is fluent with no " aphasia. Fund of knowledge is abnormal. Apraxia absent.    Cranial Nerves  CN II: Visual acuity is normal. Visual fields full to confrontation.  CN III, IV, VI: Extraocular movements intact bilaterally. Normal lids and orbits bilaterally. Pupils equal round and reactive to light bilaterally.  CN V: Facial sensation is normal.  CN VII: Full and symmetric facial movement.  CN IX, X: Palate elevates symmetrically. Normal gag reflex.  CN XI: Shoulder shrug strength is normal.  CN XII: Tongue midline without atrophy or fasciculations.    Motor  Normal muscle bulk throughout. No fasciculations present. Normal muscle tone. No abnormal involuntary movements. Strength is 5/5 throughout all four extremities.    Crepitus both knees.    Coordination    Finger-to-nose, rapid alternating movements and heel-to-shin normal bilaterally without dysmetria.    Gait  Normal casual, toe, heel and tandem gait. Romberg is absent. Able to rise from chair without using arms.        Physical Exam  Constitutional:       Appearance: Normal appearance.   Eyes:      General: Lids are normal.      Extraocular Movements: Extraocular movements intact.      Pupils: Pupils are equal, round, and reactive to light.   Neurological:      Mental Status: She is alert.      Motor: Motor strength is normal.     Coordination: Coordination is intact. Romberg sign negative.   Psychiatric:         Mood and Affect: Affect is blunt.         Speech: Speech normal.         Behavior: Behavior is slowed.         Thought Content: Thought content is delusional.         Cognition and Memory: She exhibits impaired recent memory.         Judgment: Judgment is inappropriate.         Results:  Results  Diagnostic Testing   - Erik cognitive assessment: Score is a 20 out of 30, 0 out of 5 for word recall. Miss points for cube copy, hand placement on the clock.  Prior MOCA 17-20/30    Assessment/Plan:     Diagnoses and all orders for this visit:    1. Moderate late onset  Alzheimer's dementia with agitation (Primary)  Comments:  mild to moderate, progressing, amnesia worsened.  Orders:  -     escitalopram (LEXAPRO) 5 MG tablet; Take 1 tablet by mouth Every Morning.  Dispense: 90 tablet; Refill: 3  -     memantine (NAMENDA XR) 28 MG capsule sustained-release 24 hr extended release capsule; Take 1 capsule by mouth Daily.  Dispense: 90 capsule; Refill: 3           Assessment & Plan  1. Mild now progressing to early moderate Alzheimer's disease.  She is currently taking memantine 28 mg once a day capsules. Her Erik Cognitive Assessment score is 20 out of 30, indicating mild to early moderate Alzheimer's disease. She continues to be independent in daily activities and is well-groomed. The family has been advised to remove any firearms and ammunition from the home for safety reasons. She will continue her current memory medication regimen. The family has been provided with the contact information for the memory nurse for additional support and resources. Printed off alzheimer's caregiver guide and living with alzheimer's guide for patient and family.    2. Agitation with Anxiety & Depression.  She experiences agitation, which is part of her Alzheimer's disease. The potential benefits and side effects of escitalopram were discussed. She is agreeable to trying escitalopram 5 mg once daily in the morning to help manage agitation, anxiety, and depression. A prescription for escitalopram 5 mg will be sent to [facility] in [place]. If she does not take the medication the family should notify us or if it seems to be helping, the dosage may be adjusted after 4-6 weeks if family contacts us.    3. REM sleep behavior disorder.  She is prescribed clonazepam for REM sleep behavior disorder. She reports doing well with the current regimen and has not experienced nightmares. She will continue with the current clonazepam regimen as it helps manage her symptoms. Managed by Dr. Emeterio Green with  psychiatry.    Follow-up  She will follow up in 6 to 7 months or sooner if needed.    Reviewed medications, potential side effects and signs and symptoms to report. Discussed risk versus benefits of treatment plan with patient and/or family-including medications, labs and radiology that may be ordered. Addressed questions and concerns during visit. Patient and/or family verbalized understanding and agree with plan.    During this visit the following were done:  Labs Reviewed []    Labs Ordered []    Radiology Reports Reviewed []    Radiology Ordered []    PCP Records Reviewed [x]    Referring Provider Records Reviewed []    ER Records Reviewed []    Hospital Records Reviewed []    History Obtained From Family [x]    Radiology Images Reviewed []    Other Reviewed []    Records Requested []      04/21/25   10:34 EDT    Patient or patient representative verbalized consent for the use of Ambient Listening during the visit with  JEAN-CLAUDE Wilson for chart documentation. 4/21/2025  13:38 EDT    Note to patient: The 21st Century Cures Act makes medical notes like these available to patients in the interest of transparency. However, be advised this is a medical document. It is intended as peer to peer communication. It is written in medical language and may contain abbreviations or verbiage that are unfamiliar. It may appear blunt or direct. Medical documents are intended to carry relevant information, facts as evident, and the clinical opinion of the provider.

## 2025-04-21 NOTE — LETTER
"April 21, 2025     JEAN-CLAUDE Lindsey  [address]  [address] [place], [place] [address]    Patient: Carolyn Snowden   YOB: 1950   Date of Visit: 4/21/2025       Dear JEAN-CLAUDE Lindsey    Carolyn Snowden was in my office today. Below is a copy of my note.    If you have questions, please do not hesitate to call me. I look forward to following Carolyn along with you.         Sincerely,        JEAN-CLAUDE Wilson        CC: No Recipients    Subjective:     Patient ID: Carolyn Snowden is a 74 y.o. female.    CC:   Chief Complaint   Patient presents with   • Alzheimer's Disease     HPI:   History of Present Illness  This is a 74-year-old female with mild and progressing Alzheimer's disease, presenting for a follow-up visit. Symptoms have been present since 2020, and she was last seen in October 2024. She is currently taking memantine 28 mg once daily capsules, as she was unable to adhere to a twice-daily dosing regimen. Her family assists with her care, including medication management and driving. She also has anxiety and depression but has previously declined treatment for these conditions. Additionally, she has REM sleep behavior disorder and takes clonazepam 1/2 tablet nightly long term for management. She is with her daughter during today's visit.    No significant changes in health status are reported since the last visit. Memantine 28 mg once daily is taken without any adverse effects. Dissatisfaction with memory is expressed, describing it as \"terrible.\" Despite this, independence in daily activities such as showering and dressing is maintained. Interest in trying Prevagen is noted. No falls have been experienced, and good balance and mobility are reported.    Her family notes increased agitation when reminded of certain things, attributing this symptom to Alzheimer's disease. Agitation has increased, particularly towards her grandson and daughter, who are her primary " caregivers. Agitation during testing due to inability to recall information is reported. Her daughter expresses concern about the progression of Alzheimer's disease, noting frequent forgetfulness of conversations within minutes. Desire to drive is expressed, but family discourages this due to safety concerns. Misplacement of firearms on three occasions is mentioned, leading to increased vigilance from her family. Sertraline was prescribed in March 2024 but adherence to the medication regimen was not maintained.    Her daughter inquires about what to expect with progression of memory.    Prior neurological workup and history:  She has noted symptoms since at least 2020 marked initially by forgetfulness . This has gradually worsened  over time. Additional symptoms have included impairments in short term memory . There have been associated  symptoms of depression . She denies  impairments in ADL's. Her family manages her finances and she managers her medications with a pill planner and sometimes forgets but not often. She continues to drive. She is currently residing at home with her daughter. Her daughter is now having to care for her and requires LA to get Mrs. Snowden to and from appointments.      We feel that her symptoms are most consistent with Alzheimer's disease with a possible vascular component. 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      Her highest level of education is 2 years of college. She lives with her daughter.      She did not tolerate the donepezil due to nightmares.      Maternal grandmother had Alzheimer's around age 93 prior to passing away. Her  passed away in 2012 in his sleep. REM sleep disorder 15+ years. Has been on clonazepam 1.5mg at night to treat this-sleep specialist Dr. Emeterio Green. Has had total loss of smell for 10+  years. Unknown cause. She can taste. No tremors. No shuffling. No family history of parkinson's disease. 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 Completed 1.5 years of college. Right hand dominant. Challenges with learning new things.     MRI of the brain with and without contrast completed on 11/4/2021 and imaging as well as radiology report reviewed previously in clinic and she does have some mild to moderate chronic small vessel ischemic changes with no acute intracranial abnormalities, mild atrophy of the brain consistent with age.  Compared to MRI of the brain with and without contrast from March 5, 2018 there are no significant changes.  She has had hemoglobin A1c 5.5% with PCP, ferritin 730, CBC overall normal, iron profile normal, lipid panel with total cholesterol 248, HDL 78 and  and now on low-dose Lipitor, CMP with creatinine 1.04 and otherwise normal.      She has also had vitamin B12 checked and it was 359 and folic acid 20 with methylmalonic acid normal and TSH and free T4 normal.    She has completed cognitive therapy with one of our speech and language pathologist and her RBANS score was a 361 showing moderate to severe impairments in cognition, communication, immediate and delayed memory as well as language.  Her daughter notices trouble with focus, concentration and following more complex directions.       Daughter now pays bills and manages medications.     Her other daughter passed away on 04/07/2023 from cancer.      Has previously refused to complete SLP for cognitive rehab.    Imaging  CT scan of the head showed no acute abnormalities 10/2024 in hospital.     Prior MOCA 17-19/30.    Family has suspected anxiety and depression, but she is not interested in taking medication for this. Prescribed sertraline in 3/2024 but was unwilling to take it.    The following portions of the patient's history were reviewed and updated as appropriate: allergies, current  medications, past family history, past medical history, past social history, past surgical history, and problem list.    Past Medical History:   Diagnosis Date   • Acute angina    • Anxiety    • Hyperlipidemia    • Hypertension    • NSTEMI (non-ST elevated myocardial infarction)        Past Surgical History:   Procedure Laterality Date   • CYST REMOVAL     • TUBAL ABDOMINAL LIGATION         Social History     Socioeconomic History   • Marital status:    Tobacco Use   • Smoking status: Never   • Smokeless tobacco: Never   Vaping Use   • Vaping status: Never Used   Substance and Sexual Activity   • Alcohol use: Yes     Comment: occasional   • Drug use: Never   • Sexual activity: Not Currently       Family History   Problem Relation Age of Onset   • Liver disease Father    • Breast cancer Daughter 52   • Dementia Maternal Grandmother    • Liver disease Other    • Hypertension Other    • Heart disease Other    • Stroke Other    • Heart attack Other    • Ovarian cancer Neg Hx           Current Outpatient Medications:   •  atorvastatin (LIPITOR) 10 MG tablet, Take 1 tablet by mouth Daily., Disp: 90 tablet, Rfl: 4  •  clonazePAM (KlonoPIN) 1 MG tablet, Take 1 tablet by mouth 2 (Two) Times a Day As Needed., Disp: , Rfl:   •  memantine (NAMENDA XR) 28 MG capsule sustained-release 24 hr extended release capsule, Take 1 capsule by mouth Daily., Disp: 90 capsule, Rfl: 3  •  NIFEdipine XL (PROCARDIA XL) 30 MG 24 hr tablet, Take 1 tablet by mouth Daily., Disp: 90 tablet, Rfl: 3  •  escitalopram (LEXAPRO) 5 MG tablet, Take 1 tablet by mouth Every Morning., Disp: 90 tablet, Rfl: 3  •  sodium-potassium-magnesium sulfates (Suprep Bowel Prep Kit) 17.5-3.13-1.6 GM/177ML solution oral solution, Take First Dose at 5 pm Take Second Dose at 10 pm, Nothing to eat or drink after midnight. May substitute for Golytely or Moviprep. Follow instructions provided by Office. Call [phone] with questions. (Patient not taking: Reported on  "4/21/2025), Disp: 354 mL, Rfl: 0     Review of Systems   Psychiatric/Behavioral:  Positive for decreased concentration. The patient is nervous/anxious.    All other systems reviewed and are negative.       Objective:  /70   Pulse 69   Ht 154.9 cm (61\")   Wt 57.7 kg (127 lb 4.8 oz)   SpO2 100%   BMI 24.05 kg/m²     Neurological Exam  Mental Status  Alert. Oriented only to person, place and time. (0/5) Unable to copy figure. Clock drawing is abnormal. Speech is normal. Language is fluent with no aphasia. Fund of knowledge is abnormal. Apraxia absent.    Cranial Nerves  CN II: Visual acuity is normal. Visual fields full to confrontation.  CN III, IV, VI: Extraocular movements intact bilaterally. Normal lids and orbits bilaterally. Pupils equal round and reactive to light bilaterally.  CN V: Facial sensation is normal.  CN VII: Full and symmetric facial movement.  CN IX, X: Palate elevates symmetrically. Normal gag reflex.  CN XI: Shoulder shrug strength is normal.  CN XII: Tongue midline without atrophy or fasciculations.    Motor  Normal muscle bulk throughout. No fasciculations present. Normal muscle tone. No abnormal involuntary movements. Strength is 5/5 throughout all four extremities.    Crepitus both knees.    Coordination    Finger-to-nose, rapid alternating movements and heel-to-shin normal bilaterally without dysmetria.    Gait  Normal casual, toe, heel and tandem gait. Romberg is absent. Able to rise from chair without using arms.        Physical Exam  Constitutional:       Appearance: Normal appearance.   Eyes:      General: Lids are normal.      Extraocular Movements: Extraocular movements intact.      Pupils: Pupils are equal, round, and reactive to light.   Neurological:      Mental Status: She is alert.      Motor: Motor strength is normal.     Coordination: Coordination is intact. Romberg sign negative.   Psychiatric:         Mood and Affect: Affect is blunt.         Speech: Speech normal.   "       Behavior: Behavior is slowed.         Thought Content: Thought content is delusional.         Cognition and Memory: She exhibits impaired recent memory.         Judgment: Judgment is inappropriate.         Results:  Results  Diagnostic Testing   - Erik cognitive assessment: Score is a 20 out of 30, 0 out of 5 for word recall. Miss points for cube copy, hand placement on the clock.  Prior MOCA 17-20/30    Assessment/Plan:     Diagnoses and all orders for this visit:    1. Moderate late onset Alzheimer's dementia with agitation (Primary)  Comments:  mild to moderate, progressing, amnesia worsened.  Orders:  -     escitalopram (LEXAPRO) 5 MG tablet; Take 1 tablet by mouth Every Morning.  Dispense: 90 tablet; Refill: 3  -     memantine (NAMENDA XR) 28 MG capsule sustained-release 24 hr extended release capsule; Take 1 capsule by mouth Daily.  Dispense: 90 capsule; Refill: 3           Assessment & Plan  1. Mild now progressing to early moderate Alzheimer's disease.  She is currently taking memantine 28 mg once a day capsules. Her [place] Cognitive Assessment score is 20 out of 30, indicating mild to early moderate Alzheimer's disease. She continues to be independent in daily activities and is well-groomed. The family has been advised to remove any firearms and ammunition from the home for safety reasons. She will continue her current memory medication regimen. The family has been provided with the contact information for the memory nurse for additional support and resources. Printed off alzheimer's caregiver guide and living with alzheimer's guide for patient and family.    2. Agitation with Anxiety & Depression.  She experiences agitation, which is part of her Alzheimer's disease. The potential benefits and side effects of escitalopram were discussed. She is agreeable to trying escitalopram 5 mg once daily in the morning to help manage agitation, anxiety, and depression. A prescription for escitalopram 5 mg  will be [facility] to [facility] in [place]. If she does not take the medication the family should notify us or if it seems to be helping, the dosage may be adjusted after 4-6 weeks if family contacts us.    3. REM sleep behavior disorder.  She is prescribed clonazepam for REM sleep behavior disorder. She reports doing well with the current regimen and has not experienced nightmares. She will continue with the current clonazepam regimen as it helps manage her symptoms. Managed by Dr. Emeterio Green with psychiatry.    Follow-up  She will follow up in 6 to 7 months or sooner if needed.    Reviewed medications, potential side effects and signs and symptoms to report. Discussed risk versus benefits of treatment plan with patient and/or family-including medications, labs and radiology that may be ordered. Addressed questions and concerns during visit. Patient and/or family verbalized understanding and agree with plan.    During this visit the following were done:  Labs Reviewed []    Labs Ordered []    Radiology Reports Reviewed []    Radiology Ordered []    PCP Records Reviewed [x]    Referring Provider Records Reviewed []    ER Records Reviewed []    Hospital Records Reviewed []    History Obtained From Family [x]    Radiology Images Reviewed []    Other Reviewed []    Records Requested []      04/21/25   10:34 EDT    Patient or patient representative verbalized consent for the use of Ambient Listening during the visit with  JEAN-CLAUDE Wilson for chart documentation. 4/21/2025  13:38 EDT    Note to patient: The 21st Century Cures Act makes medical notes like these available to patients in the interest of transparency. However, be advised this is a medical document. It is intended as peer to peer communication. It is written in medical language and may contain abbreviations or verbiage that are unfamiliar. It may appear blunt or direct. Medical documents are intended to carry relevant information, facts as evident, and  the clinical opinion of the provider.

## 2025-05-02 ENCOUNTER — TELEPHONE (OUTPATIENT)
Dept: NEUROLOGY | Facility: CLINIC | Age: 75
End: 2025-05-02
Payer: MEDICARE

## 2025-05-02 NOTE — TELEPHONE ENCOUNTER
She said she will have her mother call me when she gets home. I did let her know that I am also available for her and her mother both and described what ways we can communicate.   
05-Jan-2019 16:25

## 2025-06-02 NOTE — TELEPHONE ENCOUNTER
Received a refill request for sertraline? She is not taking this per my note and visit with patient on 4/21/2025. Please call daughter to see if this is being taken or if a refill is needed. I wrote for escitalopram 5mg on 4/21/2025 for anxiety, depression and agitation. That should be the only one. If she is NOT on sertraline, please call and tell pharmacy to remove it from med list.

## 2025-06-02 NOTE — TELEPHONE ENCOUNTER
Rx Refill Note  Requested Prescriptions     Pending Prescriptions Disp Refills    sertraline (ZOLOFT) 25 MG tablet [Pharmacy Med Name: SERTRALINE HCL 25 MG TABLET] 90 tablet 1     Sig: TAKE 1 TABLET BY MOUTH EVERY MORNING      Last office visit with prescribing clinician: 4/21/2025   Last telemedicine visit with prescribing clinician: Visit date not found   Next office visit with prescribing clinician: 11/26/2025                         Would you like a call back once the refill request has been completed: [] Yes [] No    If the office needs to give you a call back, can they leave a voicemail: [] Yes [] No    Lizzy Snowden CMA  06/02/25, 14:19 EDT

## 2025-06-03 DIAGNOSIS — E78.00 PURE HYPERCHOLESTEROLEMIA: ICD-10-CM

## 2025-06-03 RX ORDER — ATORVASTATIN CALCIUM 10 MG/1
10 TABLET, FILM COATED ORAL DAILY
Qty: 90 TABLET | Refills: 4 | Status: SHIPPED | OUTPATIENT
Start: 2025-06-03

## 2025-06-04 RX ORDER — SERTRALINE HYDROCHLORIDE 25 MG/1
25 TABLET, FILM COATED ORAL EVERY MORNING
Qty: 90 TABLET | Refills: 1 | OUTPATIENT
Start: 2025-06-04

## 2025-06-04 NOTE — TELEPHONE ENCOUNTER
I ATTEMPTED TO SPEAK WITH DAUGHTER MULTIPLE TIMES UNABLE TO LVM, VM FULL, I THEN CALLED THE MOBILE NUMBER AND THE PT, [facility]ORY PT, ANSWERED AND INFORMED ME THAT THE DAUGHTER IS ADMITTED TO [facility] IN A SOMEWHAT SERIOUS STATE.  I DID NOT ASK PT ANY QUESTIONS ABOUT HER MEDICATIONS BUT APOLOGIZED FOR BOTHERING HER.  I THEN CALLED THE PHARMACY AND LET THEM KNOW WHY I HAD NOT RESPONDED THE THE REFILL REQUEST AND HE SAID IT HAD NOT BEEN FILLED IN OVER A YR AND HE WOULD PULL THE SCRIPT AND LABEL INACTIVE.